# Patient Record
Sex: MALE | URBAN - METROPOLITAN AREA
[De-identification: names, ages, dates, MRNs, and addresses within clinical notes are randomized per-mention and may not be internally consistent; named-entity substitution may affect disease eponyms.]

---

## 2020-05-28 ENCOUNTER — LAB VISIT (OUTPATIENT)
Dept: LAB | Facility: HOSPITAL | Age: 60
End: 2020-05-28
Payer: MEDICAID

## 2020-05-28 DIAGNOSIS — Z01.84 ANTIBODY RESPONSE EXAMINATION: ICD-10-CM

## 2020-05-28 PROCEDURE — 86769 SARS-COV-2 COVID-19 ANTIBODY: CPT

## 2020-05-28 PROCEDURE — 36415 COLL VENOUS BLD VENIPUNCTURE: CPT

## 2020-05-29 LAB — SARS-COV-2 IGG SERPLBLD QL IA.RAPID: POSITIVE

## 2020-08-07 ENCOUNTER — LAB VISIT (OUTPATIENT)
Dept: LAB | Facility: OTHER | Age: 60
End: 2020-08-07
Payer: MEDICAID

## 2020-08-07 DIAGNOSIS — Z03.818 ENCOUNTER FOR OBSERVATION FOR SUSPECTED EXPOSURE TO OTHER BIOLOGICAL AGENTS RULED OUT: ICD-10-CM

## 2020-08-07 PROCEDURE — U0003 INFECTIOUS AGENT DETECTION BY NUCLEIC ACID (DNA OR RNA); SEVERE ACUTE RESPIRATORY SYNDROME CORONAVIRUS 2 (SARS-COV-2) (CORONAVIRUS DISEASE [COVID-19]), AMPLIFIED PROBE TECHNIQUE, MAKING USE OF HIGH THROUGHPUT TECHNOLOGIES AS DESCRIBED BY CMS-2020-01-R: HCPCS

## 2020-08-12 LAB — SARS-COV-2 RNA RESP QL NAA+PROBE: NORMAL

## 2021-01-07 ENCOUNTER — LAB VISIT (OUTPATIENT)
Dept: LAB | Facility: OTHER | Age: 61
End: 2021-01-07
Payer: MEDICAID

## 2021-01-07 DIAGNOSIS — Z03.818 ENCOUNTER FOR OBSERVATION FOR SUSPECTED EXPOSURE TO OTHER BIOLOGICAL AGENTS RULED OUT: ICD-10-CM

## 2021-01-07 PROCEDURE — U0003 INFECTIOUS AGENT DETECTION BY NUCLEIC ACID (DNA OR RNA); SEVERE ACUTE RESPIRATORY SYNDROME CORONAVIRUS 2 (SARS-COV-2) (CORONAVIRUS DISEASE [COVID-19]), AMPLIFIED PROBE TECHNIQUE, MAKING USE OF HIGH THROUGHPUT TECHNOLOGIES AS DESCRIBED BY CMS-2020-01-R: HCPCS

## 2021-01-10 LAB — SARS-COV-2 RNA RESP QL NAA+PROBE: NOT DETECTED

## 2021-01-11 ENCOUNTER — LAB VISIT (OUTPATIENT)
Dept: LAB | Facility: OTHER | Age: 61
End: 2021-01-11
Payer: MEDICAID

## 2021-01-11 DIAGNOSIS — Z03.818 ENCOUNTER FOR OBSERVATION FOR SUSPECTED EXPOSURE TO OTHER BIOLOGICAL AGENTS RULED OUT: ICD-10-CM

## 2021-01-11 PROCEDURE — U0003 INFECTIOUS AGENT DETECTION BY NUCLEIC ACID (DNA OR RNA); SEVERE ACUTE RESPIRATORY SYNDROME CORONAVIRUS 2 (SARS-COV-2) (CORONAVIRUS DISEASE [COVID-19]), AMPLIFIED PROBE TECHNIQUE, MAKING USE OF HIGH THROUGHPUT TECHNOLOGIES AS DESCRIBED BY CMS-2020-01-R: HCPCS

## 2021-01-13 LAB — SARS-COV-2 RNA RESP QL NAA+PROBE: NOT DETECTED

## 2021-01-14 ENCOUNTER — LAB VISIT (OUTPATIENT)
Dept: LAB | Facility: OTHER | Age: 61
End: 2021-01-14
Payer: MEDICAID

## 2021-01-14 DIAGNOSIS — Z20.822 ENCOUNTER FOR LABORATORY TESTING FOR COVID-19 VIRUS: ICD-10-CM

## 2021-01-14 PROCEDURE — U0003 INFECTIOUS AGENT DETECTION BY NUCLEIC ACID (DNA OR RNA); SEVERE ACUTE RESPIRATORY SYNDROME CORONAVIRUS 2 (SARS-COV-2) (CORONAVIRUS DISEASE [COVID-19]), AMPLIFIED PROBE TECHNIQUE, MAKING USE OF HIGH THROUGHPUT TECHNOLOGIES AS DESCRIBED BY CMS-2020-01-R: HCPCS

## 2021-01-15 LAB — SARS-COV-2 RNA RESP QL NAA+PROBE: NOT DETECTED

## 2021-01-18 ENCOUNTER — LAB VISIT (OUTPATIENT)
Dept: LAB | Facility: OTHER | Age: 61
End: 2021-01-18
Payer: MEDICAID

## 2021-01-18 DIAGNOSIS — Z20.822 ENCOUNTER FOR LABORATORY TESTING FOR COVID-19 VIRUS: ICD-10-CM

## 2021-01-18 PROCEDURE — U0003 INFECTIOUS AGENT DETECTION BY NUCLEIC ACID (DNA OR RNA); SEVERE ACUTE RESPIRATORY SYNDROME CORONAVIRUS 2 (SARS-COV-2) (CORONAVIRUS DISEASE [COVID-19]), AMPLIFIED PROBE TECHNIQUE, MAKING USE OF HIGH THROUGHPUT TECHNOLOGIES AS DESCRIBED BY CMS-2020-01-R: HCPCS

## 2021-01-19 LAB — SARS-COV-2 RNA RESP QL NAA+PROBE: NOT DETECTED

## 2021-01-21 ENCOUNTER — LAB VISIT (OUTPATIENT)
Dept: LAB | Facility: OTHER | Age: 61
End: 2021-01-21
Payer: MEDICAID

## 2021-01-21 DIAGNOSIS — Z20.822 ENCOUNTER FOR LABORATORY TESTING FOR COVID-19 VIRUS: ICD-10-CM

## 2021-01-21 PROCEDURE — U0003 INFECTIOUS AGENT DETECTION BY NUCLEIC ACID (DNA OR RNA); SEVERE ACUTE RESPIRATORY SYNDROME CORONAVIRUS 2 (SARS-COV-2) (CORONAVIRUS DISEASE [COVID-19]), AMPLIFIED PROBE TECHNIQUE, MAKING USE OF HIGH THROUGHPUT TECHNOLOGIES AS DESCRIBED BY CMS-2020-01-R: HCPCS

## 2021-01-23 LAB — SARS-COV-2 RNA RESP QL NAA+PROBE: NOT DETECTED

## 2021-01-25 ENCOUNTER — LAB VISIT (OUTPATIENT)
Dept: LAB | Facility: OTHER | Age: 61
End: 2021-01-25
Payer: MEDICAID

## 2021-01-25 DIAGNOSIS — Z20.822 ENCOUNTER FOR LABORATORY TESTING FOR COVID-19 VIRUS: ICD-10-CM

## 2021-01-25 PROCEDURE — U0003 INFECTIOUS AGENT DETECTION BY NUCLEIC ACID (DNA OR RNA); SEVERE ACUTE RESPIRATORY SYNDROME CORONAVIRUS 2 (SARS-COV-2) (CORONAVIRUS DISEASE [COVID-19]), AMPLIFIED PROBE TECHNIQUE, MAKING USE OF HIGH THROUGHPUT TECHNOLOGIES AS DESCRIBED BY CMS-2020-01-R: HCPCS

## 2021-01-26 LAB — SARS-COV-2 RNA RESP QL NAA+PROBE: NOT DETECTED

## 2021-01-28 ENCOUNTER — LAB VISIT (OUTPATIENT)
Dept: LAB | Facility: OTHER | Age: 61
End: 2021-01-28
Payer: MEDICAID

## 2021-01-28 DIAGNOSIS — Z20.822 ENCOUNTER FOR LABORATORY TESTING FOR COVID-19 VIRUS: ICD-10-CM

## 2021-01-28 PROCEDURE — U0003 INFECTIOUS AGENT DETECTION BY NUCLEIC ACID (DNA OR RNA); SEVERE ACUTE RESPIRATORY SYNDROME CORONAVIRUS 2 (SARS-COV-2) (CORONAVIRUS DISEASE [COVID-19]), AMPLIFIED PROBE TECHNIQUE, MAKING USE OF HIGH THROUGHPUT TECHNOLOGIES AS DESCRIBED BY CMS-2020-01-R: HCPCS

## 2021-01-29 LAB — SARS-COV-2 RNA RESP QL NAA+PROBE: NOT DETECTED

## 2021-02-04 ENCOUNTER — LAB VISIT (OUTPATIENT)
Dept: LAB | Facility: OTHER | Age: 61
End: 2021-02-04
Payer: MEDICAID

## 2021-02-04 DIAGNOSIS — Z20.822 ENCOUNTER FOR LABORATORY TESTING FOR COVID-19 VIRUS: ICD-10-CM

## 2021-02-04 PROCEDURE — U0003 INFECTIOUS AGENT DETECTION BY NUCLEIC ACID (DNA OR RNA); SEVERE ACUTE RESPIRATORY SYNDROME CORONAVIRUS 2 (SARS-COV-2) (CORONAVIRUS DISEASE [COVID-19]), AMPLIFIED PROBE TECHNIQUE, MAKING USE OF HIGH THROUGHPUT TECHNOLOGIES AS DESCRIBED BY CMS-2020-01-R: HCPCS

## 2021-02-05 LAB — SARS-COV-2 RNA RESP QL NAA+PROBE: NOT DETECTED

## 2021-02-11 ENCOUNTER — LAB VISIT (OUTPATIENT)
Dept: LAB | Facility: OTHER | Age: 61
End: 2021-02-11
Payer: MEDICAID

## 2021-02-11 DIAGNOSIS — Z20.822 ENCOUNTER FOR LABORATORY TESTING FOR COVID-19 VIRUS: ICD-10-CM

## 2021-02-11 PROCEDURE — U0003 INFECTIOUS AGENT DETECTION BY NUCLEIC ACID (DNA OR RNA); SEVERE ACUTE RESPIRATORY SYNDROME CORONAVIRUS 2 (SARS-COV-2) (CORONAVIRUS DISEASE [COVID-19]), AMPLIFIED PROBE TECHNIQUE, MAKING USE OF HIGH THROUGHPUT TECHNOLOGIES AS DESCRIBED BY CMS-2020-01-R: HCPCS

## 2021-02-12 LAB — SARS-COV-2 RNA RESP QL NAA+PROBE: NOT DETECTED

## 2021-07-26 ENCOUNTER — LAB VISIT (OUTPATIENT)
Dept: LAB | Facility: OTHER | Age: 61
End: 2021-07-26
Payer: MEDICAID

## 2021-07-26 DIAGNOSIS — Z20.822 ENCOUNTER FOR LABORATORY TESTING FOR COVID-19 VIRUS: ICD-10-CM

## 2021-07-26 PROCEDURE — U0003 INFECTIOUS AGENT DETECTION BY NUCLEIC ACID (DNA OR RNA); SEVERE ACUTE RESPIRATORY SYNDROME CORONAVIRUS 2 (SARS-COV-2) (CORONAVIRUS DISEASE [COVID-19]), AMPLIFIED PROBE TECHNIQUE, MAKING USE OF HIGH THROUGHPUT TECHNOLOGIES AS DESCRIBED BY CMS-2020-01-R: HCPCS | Performed by: NURSE PRACTITIONER

## 2021-07-28 LAB
SARS-COV-2 RNA RESP QL NAA+PROBE: NOT DETECTED
SARS-COV-2- CYCLE NUMBER: -1

## 2021-08-02 ENCOUNTER — LAB VISIT (OUTPATIENT)
Dept: LAB | Facility: OTHER | Age: 61
End: 2021-08-02
Payer: MEDICAID

## 2021-08-02 DIAGNOSIS — Z20.822 ENCOUNTER FOR LABORATORY TESTING FOR COVID-19 VIRUS: ICD-10-CM

## 2021-08-02 PROCEDURE — U0003 INFECTIOUS AGENT DETECTION BY NUCLEIC ACID (DNA OR RNA); SEVERE ACUTE RESPIRATORY SYNDROME CORONAVIRUS 2 (SARS-COV-2) (CORONAVIRUS DISEASE [COVID-19]), AMPLIFIED PROBE TECHNIQUE, MAKING USE OF HIGH THROUGHPUT TECHNOLOGIES AS DESCRIBED BY CMS-2020-01-R: HCPCS | Performed by: NURSE PRACTITIONER

## 2021-08-04 LAB
SARS-COV-2 RNA RESP QL NAA+PROBE: NOT DETECTED
SARS-COV-2- CYCLE NUMBER: -1

## 2021-08-06 ENCOUNTER — HOSPITAL ENCOUNTER (EMERGENCY)
Facility: HOSPITAL | Age: 61
Discharge: HOME | End: 2021-08-06
Attending: EMERGENCY MEDICINE
Payer: MEDICAID

## 2021-08-06 VITALS
HEIGHT: 72 IN | BODY MASS INDEX: 29.12 KG/M2 | DIASTOLIC BLOOD PRESSURE: 76 MMHG | SYSTOLIC BLOOD PRESSURE: 122 MMHG | HEART RATE: 85 BPM | RESPIRATION RATE: 16 BRPM | TEMPERATURE: 98 F | WEIGHT: 215 LBS | OXYGEN SATURATION: 96 %

## 2021-08-06 DIAGNOSIS — R00.0 TACHYCARDIA: ICD-10-CM

## 2021-08-06 DIAGNOSIS — R56.9 SEIZURE-LIKE ACTIVITY: Primary | ICD-10-CM

## 2021-08-06 LAB
ALBUMIN SERPL BCP-MCNC: 3.4 G/DL (ref 3.5–5.2)
ALP SERPL-CCNC: 128 U/L (ref 55–135)
ALT SERPL W/O P-5'-P-CCNC: 24 U/L (ref 10–44)
ANION GAP SERPL CALC-SCNC: 11 MMOL/L (ref 8–16)
AST SERPL-CCNC: 25 U/L (ref 10–40)
BACTERIA #/AREA URNS AUTO: ABNORMAL /HPF
BASOPHILS # BLD AUTO: 0.04 K/UL (ref 0–0.2)
BASOPHILS NFR BLD: 0.3 % (ref 0–1.9)
BILIRUB SERPL-MCNC: 0.4 MG/DL (ref 0.1–1)
BILIRUB UR QL STRIP: NEGATIVE
BUN SERPL-MCNC: 8 MG/DL (ref 6–20)
CALCIUM SERPL-MCNC: 9.1 MG/DL (ref 8.7–10.5)
CHLORIDE SERPL-SCNC: 103 MMOL/L (ref 95–110)
CK SERPL-CCNC: 69 U/L (ref 20–200)
CLARITY UR REFRACT.AUTO: CLEAR
CO2 SERPL-SCNC: 27 MMOL/L (ref 23–29)
COLOR UR AUTO: ABNORMAL
CREAT SERPL-MCNC: 0.7 MG/DL (ref 0.5–1.4)
DIFFERENTIAL METHOD: ABNORMAL
EOSINOPHIL # BLD AUTO: 0.2 K/UL (ref 0–0.5)
EOSINOPHIL NFR BLD: 1.7 % (ref 0–8)
ERYTHROCYTE [DISTWIDTH] IN BLOOD BY AUTOMATED COUNT: 18.4 % (ref 11.5–14.5)
EST. GFR  (AFRICAN AMERICAN): >60 ML/MIN/1.73 M^2
EST. GFR  (NON AFRICAN AMERICAN): >60 ML/MIN/1.73 M^2
GLUCOSE SERPL-MCNC: 112 MG/DL (ref 70–110)
GLUCOSE UR QL STRIP: NEGATIVE
HCT VFR BLD AUTO: 45.6 % (ref 40–54)
HCV AB SERPL QL IA: NEGATIVE
HGB BLD-MCNC: 14.3 G/DL (ref 14–18)
HGB UR QL STRIP: ABNORMAL
HIV 1+2 AB+HIV1 P24 AG SERPL QL IA: NEGATIVE
IMM GRANULOCYTES # BLD AUTO: 0.02 K/UL (ref 0–0.04)
IMM GRANULOCYTES NFR BLD AUTO: 0.2 % (ref 0–0.5)
KETONES UR QL STRIP: NEGATIVE
LEUKOCYTE ESTERASE UR QL STRIP: NEGATIVE
LYMPHOCYTES # BLD AUTO: 1.5 K/UL (ref 1–4.8)
LYMPHOCYTES NFR BLD: 12.9 % (ref 18–48)
MAGNESIUM SERPL-MCNC: 1.8 MG/DL (ref 1.6–2.6)
MCH RBC QN AUTO: 22 PG (ref 27–31)
MCHC RBC AUTO-ENTMCNC: 31.4 G/DL (ref 32–36)
MCV RBC AUTO: 70 FL (ref 82–98)
MICROSCOPIC COMMENT: ABNORMAL
MONOCYTES # BLD AUTO: 0.9 K/UL (ref 0.3–1)
MONOCYTES NFR BLD: 8 % (ref 4–15)
NEUTROPHILS # BLD AUTO: 9.1 K/UL (ref 1.8–7.7)
NEUTROPHILS NFR BLD: 76.9 % (ref 38–73)
NITRITE UR QL STRIP: NEGATIVE
NRBC BLD-RTO: 0 /100 WBC
PH UR STRIP: 6 [PH] (ref 5–8)
PLATELET # BLD AUTO: 385 K/UL (ref 150–450)
PMV BLD AUTO: 11.3 FL (ref 9.2–12.9)
POCT GLUCOSE: 143 MG/DL (ref 70–110)
POTASSIUM SERPL-SCNC: 3.8 MMOL/L (ref 3.5–5.1)
PROT SERPL-MCNC: 7.9 G/DL (ref 6–8.4)
PROT UR QL STRIP: NEGATIVE
RBC # BLD AUTO: 6.49 M/UL (ref 4.6–6.2)
RBC #/AREA URNS AUTO: 10 /HPF (ref 0–4)
SODIUM SERPL-SCNC: 141 MMOL/L (ref 136–145)
SP GR UR STRIP: 1 (ref 1–1.03)
SQUAMOUS #/AREA URNS AUTO: 1 /HPF
URN SPEC COLLECT METH UR: ABNORMAL
WBC # BLD AUTO: 11.77 K/UL (ref 3.9–12.7)
WBC #/AREA URNS AUTO: 2 /HPF (ref 0–5)

## 2021-08-06 PROCEDURE — 99285 PR EMERGENCY DEPT VISIT,LEVEL V: ICD-10-PCS | Mod: ,,, | Performed by: EMERGENCY MEDICINE

## 2021-08-06 PROCEDURE — 93010 ELECTROCARDIOGRAM REPORT: CPT | Mod: ,,, | Performed by: INTERNAL MEDICINE

## 2021-08-06 PROCEDURE — 80053 COMPREHEN METABOLIC PANEL: CPT | Performed by: EMERGENCY MEDICINE

## 2021-08-06 PROCEDURE — 81001 URINALYSIS AUTO W/SCOPE: CPT | Performed by: EMERGENCY MEDICINE

## 2021-08-06 PROCEDURE — 82550 ASSAY OF CK (CPK): CPT | Performed by: EMERGENCY MEDICINE

## 2021-08-06 PROCEDURE — 93005 ELECTROCARDIOGRAM TRACING: CPT

## 2021-08-06 PROCEDURE — 99285 EMERGENCY DEPT VISIT HI MDM: CPT | Mod: ,,, | Performed by: EMERGENCY MEDICINE

## 2021-08-06 PROCEDURE — 93010 EKG 12-LEAD: ICD-10-PCS | Mod: ,,, | Performed by: INTERNAL MEDICINE

## 2021-08-06 PROCEDURE — 99285 EMERGENCY DEPT VISIT HI MDM: CPT | Mod: 25

## 2021-08-06 PROCEDURE — 86803 HEPATITIS C AB TEST: CPT | Performed by: EMERGENCY MEDICINE

## 2021-08-06 PROCEDURE — 83735 ASSAY OF MAGNESIUM: CPT | Performed by: EMERGENCY MEDICINE

## 2021-08-06 PROCEDURE — 85025 COMPLETE CBC W/AUTO DIFF WBC: CPT | Performed by: EMERGENCY MEDICINE

## 2021-08-06 PROCEDURE — 87389 HIV-1 AG W/HIV-1&-2 AB AG IA: CPT | Performed by: EMERGENCY MEDICINE

## 2021-08-06 PROCEDURE — 82962 GLUCOSE BLOOD TEST: CPT

## 2021-08-16 ENCOUNTER — LAB VISIT (OUTPATIENT)
Dept: LAB | Facility: OTHER | Age: 61
End: 2021-08-16
Payer: MEDICAID

## 2021-08-16 DIAGNOSIS — Z20.822 ENCOUNTER FOR LABORATORY TESTING FOR COVID-19 VIRUS: ICD-10-CM

## 2021-08-16 PROCEDURE — U0003 INFECTIOUS AGENT DETECTION BY NUCLEIC ACID (DNA OR RNA); SEVERE ACUTE RESPIRATORY SYNDROME CORONAVIRUS 2 (SARS-COV-2) (CORONAVIRUS DISEASE [COVID-19]), AMPLIFIED PROBE TECHNIQUE, MAKING USE OF HIGH THROUGHPUT TECHNOLOGIES AS DESCRIBED BY CMS-2020-01-R: HCPCS | Performed by: NURSE PRACTITIONER

## 2021-08-18 LAB
SARS-COV-2 RNA RESP QL NAA+PROBE: NOT DETECTED
SARS-COV-2- CYCLE NUMBER: -1

## 2021-08-23 ENCOUNTER — LAB VISIT (OUTPATIENT)
Dept: LAB | Facility: OTHER | Age: 61
End: 2021-08-23
Payer: MEDICAID

## 2021-08-23 DIAGNOSIS — Z20.822 ENCOUNTER FOR LABORATORY TESTING FOR COVID-19 VIRUS: ICD-10-CM

## 2021-08-23 PROCEDURE — U0003 INFECTIOUS AGENT DETECTION BY NUCLEIC ACID (DNA OR RNA); SEVERE ACUTE RESPIRATORY SYNDROME CORONAVIRUS 2 (SARS-COV-2) (CORONAVIRUS DISEASE [COVID-19]), AMPLIFIED PROBE TECHNIQUE, MAKING USE OF HIGH THROUGHPUT TECHNOLOGIES AS DESCRIBED BY CMS-2020-01-R: HCPCS | Performed by: NURSE PRACTITIONER

## 2021-08-26 LAB — SARS-COV-2 RNA RESP QL NAA+PROBE: NOT DETECTED

## 2021-09-13 ENCOUNTER — LAB VISIT (OUTPATIENT)
Dept: LAB | Facility: OTHER | Age: 61
End: 2021-09-13
Payer: MEDICAID

## 2021-09-13 DIAGNOSIS — Z20.822 ENCOUNTER FOR LABORATORY TESTING FOR COVID-19 VIRUS: ICD-10-CM

## 2021-09-13 PROCEDURE — U0003 INFECTIOUS AGENT DETECTION BY NUCLEIC ACID (DNA OR RNA); SEVERE ACUTE RESPIRATORY SYNDROME CORONAVIRUS 2 (SARS-COV-2) (CORONAVIRUS DISEASE [COVID-19]), AMPLIFIED PROBE TECHNIQUE, MAKING USE OF HIGH THROUGHPUT TECHNOLOGIES AS DESCRIBED BY CMS-2020-01-R: HCPCS | Performed by: NURSE PRACTITIONER

## 2021-09-15 LAB
SARS-COV-2 RNA RESP QL NAA+PROBE: NOT DETECTED
SARS-COV-2- CYCLE NUMBER: NORMAL

## 2021-09-20 ENCOUNTER — LAB VISIT (OUTPATIENT)
Dept: LAB | Facility: OTHER | Age: 61
End: 2021-09-20
Payer: MEDICAID

## 2021-09-20 DIAGNOSIS — Z20.822 ENCOUNTER FOR LABORATORY TESTING FOR COVID-19 VIRUS: ICD-10-CM

## 2021-09-20 PROCEDURE — U0003 INFECTIOUS AGENT DETECTION BY NUCLEIC ACID (DNA OR RNA); SEVERE ACUTE RESPIRATORY SYNDROME CORONAVIRUS 2 (SARS-COV-2) (CORONAVIRUS DISEASE [COVID-19]), AMPLIFIED PROBE TECHNIQUE, MAKING USE OF HIGH THROUGHPUT TECHNOLOGIES AS DESCRIBED BY CMS-2020-01-R: HCPCS | Performed by: NURSE PRACTITIONER

## 2021-09-21 LAB
SARS-COV-2 RNA RESP QL NAA+PROBE: NOT DETECTED
SARS-COV-2- CYCLE NUMBER: NORMAL

## 2021-12-27 ENCOUNTER — LAB VISIT (OUTPATIENT)
Dept: LAB | Facility: OTHER | Age: 61
End: 2021-12-27
Payer: MEDICAID

## 2021-12-27 DIAGNOSIS — Z20.822 ENCOUNTER FOR LABORATORY TESTING FOR COVID-19 VIRUS: ICD-10-CM

## 2021-12-27 PROCEDURE — U0003 INFECTIOUS AGENT DETECTION BY NUCLEIC ACID (DNA OR RNA); SEVERE ACUTE RESPIRATORY SYNDROME CORONAVIRUS 2 (SARS-COV-2) (CORONAVIRUS DISEASE [COVID-19]), AMPLIFIED PROBE TECHNIQUE, MAKING USE OF HIGH THROUGHPUT TECHNOLOGIES AS DESCRIBED BY CMS-2020-01-R: HCPCS | Performed by: NURSE PRACTITIONER

## 2021-12-28 LAB
SARS-COV-2 RNA RESP QL NAA+PROBE: NOT DETECTED
SARS-COV-2- CYCLE NUMBER: NORMAL

## 2022-01-03 ENCOUNTER — LAB VISIT (OUTPATIENT)
Dept: LAB | Facility: OTHER | Age: 62
End: 2022-01-03
Payer: MEDICAID

## 2022-01-03 DIAGNOSIS — Z20.822 ENCOUNTER FOR LABORATORY TESTING FOR COVID-19 VIRUS: ICD-10-CM

## 2022-01-03 PROCEDURE — U0003 INFECTIOUS AGENT DETECTION BY NUCLEIC ACID (DNA OR RNA); SEVERE ACUTE RESPIRATORY SYNDROME CORONAVIRUS 2 (SARS-COV-2) (CORONAVIRUS DISEASE [COVID-19]), AMPLIFIED PROBE TECHNIQUE, MAKING USE OF HIGH THROUGHPUT TECHNOLOGIES AS DESCRIBED BY CMS-2020-01-R: HCPCS | Performed by: NURSE PRACTITIONER

## 2022-01-06 LAB
SARS-COV-2 RNA RESP QL NAA+PROBE: NOT DETECTED
SARS-COV-2- CYCLE NUMBER: NORMAL

## 2022-01-10 ENCOUNTER — LAB VISIT (OUTPATIENT)
Dept: LAB | Facility: OTHER | Age: 62
End: 2022-01-10
Payer: MEDICAID

## 2022-01-10 DIAGNOSIS — Z20.822 ENCOUNTER FOR LABORATORY TESTING FOR COVID-19 VIRUS: ICD-10-CM

## 2022-01-10 PROCEDURE — U0003 INFECTIOUS AGENT DETECTION BY NUCLEIC ACID (DNA OR RNA); SEVERE ACUTE RESPIRATORY SYNDROME CORONAVIRUS 2 (SARS-COV-2) (CORONAVIRUS DISEASE [COVID-19]), AMPLIFIED PROBE TECHNIQUE, MAKING USE OF HIGH THROUGHPUT TECHNOLOGIES AS DESCRIBED BY CMS-2020-01-R: HCPCS | Performed by: NURSE PRACTITIONER

## 2022-01-11 LAB
SARS-COV-2 RNA RESP QL NAA+PROBE: NOT DETECTED
SARS-COV-2- CYCLE NUMBER: NORMAL

## 2022-01-31 ENCOUNTER — LAB VISIT (OUTPATIENT)
Dept: LAB | Facility: OTHER | Age: 62
End: 2022-01-31
Payer: MEDICAID

## 2022-01-31 DIAGNOSIS — Z20.822 ENCOUNTER FOR LABORATORY TESTING FOR COVID-19 VIRUS: ICD-10-CM

## 2022-01-31 PROCEDURE — U0003 INFECTIOUS AGENT DETECTION BY NUCLEIC ACID (DNA OR RNA); SEVERE ACUTE RESPIRATORY SYNDROME CORONAVIRUS 2 (SARS-COV-2) (CORONAVIRUS DISEASE [COVID-19]), AMPLIFIED PROBE TECHNIQUE, MAKING USE OF HIGH THROUGHPUT TECHNOLOGIES AS DESCRIBED BY CMS-2020-01-R: HCPCS | Performed by: EMERGENCY MEDICINE

## 2022-02-01 LAB
SARS-COV-2 RNA RESP QL NAA+PROBE: NOT DETECTED
SARS-COV-2- CYCLE NUMBER: NORMAL

## 2022-02-09 ENCOUNTER — LAB VISIT (OUTPATIENT)
Dept: LAB | Facility: OTHER | Age: 62
End: 2022-02-09
Payer: MEDICAID

## 2022-02-09 DIAGNOSIS — Z20.822 ENCOUNTER FOR LABORATORY TESTING FOR COVID-19 VIRUS: ICD-10-CM

## 2022-02-09 LAB
SARS-COV-2 RNA RESP QL NAA+PROBE: NOT DETECTED
SARS-COV-2- CYCLE NUMBER: NORMAL

## 2022-02-09 PROCEDURE — U0003 INFECTIOUS AGENT DETECTION BY NUCLEIC ACID (DNA OR RNA); SEVERE ACUTE RESPIRATORY SYNDROME CORONAVIRUS 2 (SARS-COV-2) (CORONAVIRUS DISEASE [COVID-19]), AMPLIFIED PROBE TECHNIQUE, MAKING USE OF HIGH THROUGHPUT TECHNOLOGIES AS DESCRIBED BY CMS-2020-01-R: HCPCS | Performed by: EMERGENCY MEDICINE

## 2022-02-16 ENCOUNTER — LAB VISIT (OUTPATIENT)
Dept: LAB | Facility: OTHER | Age: 62
End: 2022-02-16
Payer: MEDICAID

## 2022-02-16 DIAGNOSIS — Z20.822 ENCOUNTER FOR LABORATORY TESTING FOR COVID-19 VIRUS: ICD-10-CM

## 2022-02-16 PROCEDURE — U0003 INFECTIOUS AGENT DETECTION BY NUCLEIC ACID (DNA OR RNA); SEVERE ACUTE RESPIRATORY SYNDROME CORONAVIRUS 2 (SARS-COV-2) (CORONAVIRUS DISEASE [COVID-19]), AMPLIFIED PROBE TECHNIQUE, MAKING USE OF HIGH THROUGHPUT TECHNOLOGIES AS DESCRIBED BY CMS-2020-01-R: HCPCS | Performed by: EMERGENCY MEDICINE

## 2022-02-17 LAB — SARS-COV-2 RNA RESP QL NAA+PROBE: NOT DETECTED

## 2022-05-12 ENCOUNTER — HOSPITAL ENCOUNTER (INPATIENT)
Facility: HOSPITAL | Age: 62
LOS: 5 days | Discharge: HOME | DRG: 871 | End: 2022-05-17
Attending: EMERGENCY MEDICINE | Admitting: HOSPITALIST
Payer: MEDICAID

## 2022-05-12 DIAGNOSIS — R41.82 AMS (ALTERED MENTAL STATUS): ICD-10-CM

## 2022-05-12 DIAGNOSIS — J18.9 PNEUMONIA: ICD-10-CM

## 2022-05-12 DIAGNOSIS — A41.9 SEPSIS: ICD-10-CM

## 2022-05-12 DIAGNOSIS — R07.9 CHEST PAIN: ICD-10-CM

## 2022-05-12 DIAGNOSIS — T40.2X4A OPIOID OVERDOSE, UNDETERMINED INTENT, INITIAL ENCOUNTER: ICD-10-CM

## 2022-05-12 DIAGNOSIS — R41.89 UNRESPONSIVE: Primary | ICD-10-CM

## 2022-05-12 DIAGNOSIS — E87.6 HYPOKALEMIA: ICD-10-CM

## 2022-05-12 DIAGNOSIS — K85.90 ACUTE PANCREATITIS, UNSPECIFIED COMPLICATION STATUS, UNSPECIFIED PANCREATITIS TYPE: ICD-10-CM

## 2022-05-12 PROBLEM — J44.9 COPD (CHRONIC OBSTRUCTIVE PULMONARY DISEASE): Status: ACTIVE | Noted: 2022-05-12

## 2022-05-12 PROBLEM — I10 HYPERTENSION: Status: ACTIVE | Noted: 2022-05-12

## 2022-05-12 PROBLEM — J96.02 ACUTE HYPERCAPNIC RESPIRATORY FAILURE: Status: RESOLVED | Noted: 2022-05-12 | Resolved: 2022-05-12

## 2022-05-12 PROBLEM — K56.7 ILEUS: Status: ACTIVE | Noted: 2022-05-12

## 2022-05-12 PROBLEM — J96.02 ACUTE HYPERCAPNIC RESPIRATORY FAILURE: Status: ACTIVE | Noted: 2022-05-12

## 2022-05-12 PROBLEM — Z86.73 HISTORY OF CVA (CEREBROVASCULAR ACCIDENT): Status: ACTIVE | Noted: 2022-05-12

## 2022-05-12 LAB
ABO + RH BLD: NORMAL
ALBUMIN SERPL BCP-MCNC: 3.2 G/DL (ref 3.5–5.2)
ALLENS TEST: ABNORMAL
ALLENS TEST: ABNORMAL
ALP SERPL-CCNC: 105 U/L (ref 55–135)
ALT SERPL W/O P-5'-P-CCNC: 19 U/L (ref 10–44)
AMPHET+METHAMPHET UR QL: NEGATIVE
ANION GAP SERPL CALC-SCNC: 13 MMOL/L (ref 8–16)
AST SERPL-CCNC: 19 U/L (ref 10–40)
BACTERIA #/AREA URNS AUTO: ABNORMAL /HPF
BARBITURATES UR QL SCN>200 NG/ML: NEGATIVE
BASOPHILS # BLD AUTO: 0.04 K/UL (ref 0–0.2)
BASOPHILS NFR BLD: 0.3 % (ref 0–1.9)
BENZODIAZ UR QL SCN>200 NG/ML: NEGATIVE
BILIRUB SERPL-MCNC: 0.3 MG/DL (ref 0.1–1)
BILIRUB UR QL STRIP: NEGATIVE
BLD GP AB SCN CELLS X3 SERPL QL: NORMAL
BNP SERPL-MCNC: <10 PG/ML (ref 0–99)
BUN SERPL-MCNC: 10 MG/DL (ref 6–20)
BUN SERPL-MCNC: 9 MG/DL (ref 6–30)
BZE UR QL SCN: NEGATIVE
CALCIUM SERPL-MCNC: 8.5 MG/DL (ref 8.7–10.5)
CANNABINOIDS UR QL SCN: NEGATIVE
CHLORIDE SERPL-SCNC: 101 MMOL/L (ref 95–110)
CHLORIDE SERPL-SCNC: 103 MMOL/L (ref 95–110)
CLARITY UR REFRACT.AUTO: CLEAR
CO2 SERPL-SCNC: 25 MMOL/L (ref 23–29)
COLOR UR AUTO: YELLOW
CREAT SERPL-MCNC: 0.7 MG/DL (ref 0.5–1.4)
CREAT SERPL-MCNC: 0.8 MG/DL (ref 0.5–1.4)
CREAT UR-MCNC: 71 MG/DL (ref 23–375)
CTP QC/QA: YES
DIFFERENTIAL METHOD: ABNORMAL
EOSINOPHIL # BLD AUTO: 0.3 K/UL (ref 0–0.5)
EOSINOPHIL NFR BLD: 2.3 % (ref 0–8)
ERYTHROCYTE [DISTWIDTH] IN BLOOD BY AUTOMATED COUNT: 18.6 % (ref 11.5–14.5)
EST. GFR  (AFRICAN AMERICAN): >60 ML/MIN/1.73 M^2
EST. GFR  (NON AFRICAN AMERICAN): >60 ML/MIN/1.73 M^2
GLUCOSE SERPL-MCNC: 187 MG/DL (ref 70–110)
GLUCOSE SERPL-MCNC: 188 MG/DL (ref 70–110)
GLUCOSE UR QL STRIP: NEGATIVE
HCO3 UR-SCNC: 29.3 MMOL/L (ref 24–28)
HCO3 UR-SCNC: 32.9 MMOL/L (ref 24–28)
HCT VFR BLD AUTO: 42.6 % (ref 40–54)
HCT VFR BLD CALC: 43 %PCV (ref 36–54)
HGB BLD-MCNC: 13.3 G/DL (ref 14–18)
HGB UR QL STRIP: ABNORMAL
HYALINE CASTS UR QL AUTO: 17 /LPF
IMM GRANULOCYTES # BLD AUTO: 0.05 K/UL (ref 0–0.04)
IMM GRANULOCYTES NFR BLD AUTO: 0.3 % (ref 0–0.5)
KETONES UR QL STRIP: NEGATIVE
LACTATE SERPL-SCNC: 3.1 MMOL/L (ref 0.5–2.2)
LEUKOCYTE ESTERASE UR QL STRIP: NEGATIVE
LIPASE SERPL-CCNC: 218 U/L (ref 4–60)
LYMPHOCYTES # BLD AUTO: 1.8 K/UL (ref 1–4.8)
LYMPHOCYTES NFR BLD: 12.2 % (ref 18–48)
MAGNESIUM SERPL-MCNC: 1.7 MG/DL (ref 1.6–2.6)
MCH RBC QN AUTO: 22.1 PG (ref 27–31)
MCHC RBC AUTO-ENTMCNC: 31.2 G/DL (ref 32–36)
MCV RBC AUTO: 71 FL (ref 82–98)
METHADONE UR QL SCN>300 NG/ML: NEGATIVE
MICROSCOPIC COMMENT: ABNORMAL
MONOCYTES # BLD AUTO: 1 K/UL (ref 0.3–1)
MONOCYTES NFR BLD: 6.5 % (ref 4–15)
NEUTROPHILS # BLD AUTO: 11.6 K/UL (ref 1.8–7.7)
NEUTROPHILS NFR BLD: 78.4 % (ref 38–73)
NITRITE UR QL STRIP: NEGATIVE
NRBC BLD-RTO: 0 /100 WBC
OPIATES UR QL SCN: NEGATIVE
PCO2 BLDA: 61.2 MMHG (ref 35–45)
PCO2 BLDA: 62.2 MMHG (ref 35–45)
PCP UR QL SCN>25 NG/ML: NEGATIVE
PH SMN: 7.29 [PH] (ref 7.35–7.45)
PH SMN: 7.33 [PH] (ref 7.35–7.45)
PH UR STRIP: 6 [PH] (ref 5–8)
PLATELET # BLD AUTO: 326 K/UL (ref 150–450)
PMV BLD AUTO: 11 FL (ref 9.2–12.9)
PO2 BLDA: 56 MMHG (ref 40–60)
PO2 BLDA: 64 MMHG (ref 40–60)
POC BE: 3 MMOL/L
POC BE: 7 MMOL/L
POC IONIZED CALCIUM: 1.06 MMOL/L (ref 1.06–1.42)
POC SATURATED O2: 86 % (ref 95–100)
POC SATURATED O2: 89 % (ref 95–100)
POC TCO2 (MEASURED): 26 MMOL/L (ref 23–29)
POC TCO2: 31 MMOL/L (ref 24–29)
POC TCO2: 35 MMOL/L (ref 24–29)
POTASSIUM BLD-SCNC: 3 MMOL/L (ref 3.5–5.1)
POTASSIUM SERPL-SCNC: 3 MMOL/L (ref 3.5–5.1)
PROT SERPL-MCNC: 7 G/DL (ref 6–8.4)
PROT UR QL STRIP: ABNORMAL
RBC # BLD AUTO: 6.01 M/UL (ref 4.6–6.2)
RBC #/AREA URNS AUTO: 22 /HPF (ref 0–4)
SAMPLE: ABNORMAL
SARS-COV-2 RDRP RESP QL NAA+PROBE: NEGATIVE
SITE: ABNORMAL
SITE: ABNORMAL
SODIUM BLD-SCNC: 141 MMOL/L (ref 136–145)
SODIUM SERPL-SCNC: 141 MMOL/L (ref 136–145)
SP GR UR STRIP: 1.01 (ref 1–1.03)
T4 FREE SERPL-MCNC: 0.94 NG/DL (ref 0.71–1.51)
TOXICOLOGY INFORMATION: NORMAL
TROPONIN I SERPL DL<=0.01 NG/ML-MCNC: <0.006 NG/ML (ref 0–0.03)
TSH SERPL DL<=0.005 MIU/L-ACNC: 5.01 UIU/ML (ref 0.4–4)
URN SPEC COLLECT METH UR: ABNORMAL
WBC # BLD AUTO: 14.84 K/UL (ref 3.9–12.7)
WBC #/AREA URNS AUTO: 4 /HPF (ref 0–5)

## 2022-05-12 PROCEDURE — 93010 EKG 12-LEAD: ICD-10-PCS | Mod: ,,, | Performed by: INTERNAL MEDICINE

## 2022-05-12 PROCEDURE — 83880 ASSAY OF NATRIURETIC PEPTIDE: CPT | Performed by: STUDENT IN AN ORGANIZED HEALTH CARE EDUCATION/TRAINING PROGRAM

## 2022-05-12 PROCEDURE — 82803 BLOOD GASES ANY COMBINATION: CPT

## 2022-05-12 PROCEDURE — 99291 PR CRITICAL CARE, E/M 30-74 MINUTES: ICD-10-PCS | Mod: CS,,, | Performed by: EMERGENCY MEDICINE

## 2022-05-12 PROCEDURE — 87186 SC STD MICRODIL/AGAR DIL: CPT | Mod: 59 | Performed by: STUDENT IN AN ORGANIZED HEALTH CARE EDUCATION/TRAINING PROGRAM

## 2022-05-12 PROCEDURE — 84439 ASSAY OF FREE THYROXINE: CPT | Performed by: STUDENT IN AN ORGANIZED HEALTH CARE EDUCATION/TRAINING PROGRAM

## 2022-05-12 PROCEDURE — 80047 BASIC METABLC PNL IONIZED CA: CPT

## 2022-05-12 PROCEDURE — 96361 HYDRATE IV INFUSION ADD-ON: CPT

## 2022-05-12 PROCEDURE — 86900 BLOOD TYPING SEROLOGIC ABO: CPT | Performed by: STUDENT IN AN ORGANIZED HEALTH CARE EDUCATION/TRAINING PROGRAM

## 2022-05-12 PROCEDURE — 63600175 PHARM REV CODE 636 W HCPCS

## 2022-05-12 PROCEDURE — 99291 CRITICAL CARE FIRST HOUR: CPT | Mod: CS,,, | Performed by: EMERGENCY MEDICINE

## 2022-05-12 PROCEDURE — U0002 COVID-19 LAB TEST NON-CDC: HCPCS | Performed by: STUDENT IN AN ORGANIZED HEALTH CARE EDUCATION/TRAINING PROGRAM

## 2022-05-12 PROCEDURE — 63600175 PHARM REV CODE 636 W HCPCS: Performed by: EMERGENCY MEDICINE

## 2022-05-12 PROCEDURE — 80307 DRUG TEST PRSMV CHEM ANLYZR: CPT | Performed by: STUDENT IN AN ORGANIZED HEALTH CARE EDUCATION/TRAINING PROGRAM

## 2022-05-12 PROCEDURE — 96365 THER/PROPH/DIAG IV INF INIT: CPT

## 2022-05-12 PROCEDURE — 85025 COMPLETE CBC W/AUTO DIFF WBC: CPT | Performed by: STUDENT IN AN ORGANIZED HEALTH CARE EDUCATION/TRAINING PROGRAM

## 2022-05-12 PROCEDURE — 80053 COMPREHEN METABOLIC PANEL: CPT | Performed by: STUDENT IN AN ORGANIZED HEALTH CARE EDUCATION/TRAINING PROGRAM

## 2022-05-12 PROCEDURE — 99291 CRITICAL CARE FIRST HOUR: CPT | Mod: 25

## 2022-05-12 PROCEDURE — 84443 ASSAY THYROID STIM HORMONE: CPT | Performed by: STUDENT IN AN ORGANIZED HEALTH CARE EDUCATION/TRAINING PROGRAM

## 2022-05-12 PROCEDURE — 96375 TX/PRO/DX INJ NEW DRUG ADDON: CPT

## 2022-05-12 PROCEDURE — 83735 ASSAY OF MAGNESIUM: CPT | Performed by: STUDENT IN AN ORGANIZED HEALTH CARE EDUCATION/TRAINING PROGRAM

## 2022-05-12 PROCEDURE — 83690 ASSAY OF LIPASE: CPT | Performed by: STUDENT IN AN ORGANIZED HEALTH CARE EDUCATION/TRAINING PROGRAM

## 2022-05-12 PROCEDURE — 93005 ELECTROCARDIOGRAM TRACING: CPT

## 2022-05-12 PROCEDURE — 25000003 PHARM REV CODE 250: Performed by: STUDENT IN AN ORGANIZED HEALTH CARE EDUCATION/TRAINING PROGRAM

## 2022-05-12 PROCEDURE — 83605 ASSAY OF LACTIC ACID: CPT | Performed by: STUDENT IN AN ORGANIZED HEALTH CARE EDUCATION/TRAINING PROGRAM

## 2022-05-12 PROCEDURE — 87077 CULTURE AEROBIC IDENTIFY: CPT | Mod: 59 | Performed by: STUDENT IN AN ORGANIZED HEALTH CARE EDUCATION/TRAINING PROGRAM

## 2022-05-12 PROCEDURE — 20600001 HC STEP DOWN PRIVATE ROOM

## 2022-05-12 PROCEDURE — 86850 RBC ANTIBODY SCREEN: CPT | Performed by: STUDENT IN AN ORGANIZED HEALTH CARE EDUCATION/TRAINING PROGRAM

## 2022-05-12 PROCEDURE — 93010 ELECTROCARDIOGRAM REPORT: CPT | Mod: ,,, | Performed by: INTERNAL MEDICINE

## 2022-05-12 PROCEDURE — 87040 BLOOD CULTURE FOR BACTERIA: CPT | Performed by: STUDENT IN AN ORGANIZED HEALTH CARE EDUCATION/TRAINING PROGRAM

## 2022-05-12 PROCEDURE — 84484 ASSAY OF TROPONIN QUANT: CPT | Performed by: STUDENT IN AN ORGANIZED HEALTH CARE EDUCATION/TRAINING PROGRAM

## 2022-05-12 PROCEDURE — 81001 URINALYSIS AUTO W/SCOPE: CPT | Performed by: STUDENT IN AN ORGANIZED HEALTH CARE EDUCATION/TRAINING PROGRAM

## 2022-05-12 PROCEDURE — 99900035 HC TECH TIME PER 15 MIN (STAT)

## 2022-05-12 PROCEDURE — 63600175 PHARM REV CODE 636 W HCPCS: Performed by: STUDENT IN AN ORGANIZED HEALTH CARE EDUCATION/TRAINING PROGRAM

## 2022-05-12 RX ORDER — POTASSIUM CHLORIDE 20 MEQ/1
20 TABLET, EXTENDED RELEASE ORAL 2 TIMES DAILY
Status: DISCONTINUED | OUTPATIENT
Start: 2022-05-13 | End: 2022-05-17 | Stop reason: HOSPADM

## 2022-05-12 RX ORDER — ACETAMINOPHEN 325 MG/1
650 TABLET ORAL EVERY 4 HOURS PRN
Status: DISCONTINUED | OUTPATIENT
Start: 2022-05-12 | End: 2022-05-17 | Stop reason: HOSPADM

## 2022-05-12 RX ORDER — SODIUM CHLORIDE 0.9 % (FLUSH) 0.9 %
10 SYRINGE (ML) INJECTION EVERY 12 HOURS PRN
Status: DISCONTINUED | OUTPATIENT
Start: 2022-05-12 | End: 2022-05-17 | Stop reason: HOSPADM

## 2022-05-12 RX ORDER — IBUPROFEN 200 MG
16 TABLET ORAL
Status: DISCONTINUED | OUTPATIENT
Start: 2022-05-12 | End: 2022-05-17 | Stop reason: HOSPADM

## 2022-05-12 RX ORDER — NALOXONE HYDROCHLORIDE 1 MG/ML
INJECTION INTRAMUSCULAR; INTRAVENOUS; SUBCUTANEOUS
Status: COMPLETED
Start: 2022-05-12 | End: 2022-05-12

## 2022-05-12 RX ORDER — GLUCAGON 1 MG
1 KIT INJECTION
Status: DISCONTINUED | OUTPATIENT
Start: 2022-05-12 | End: 2022-05-17 | Stop reason: HOSPADM

## 2022-05-12 RX ORDER — POTASSIUM CHLORIDE 7.45 MG/ML
10 INJECTION INTRAVENOUS
Status: COMPLETED | OUTPATIENT
Start: 2022-05-12 | End: 2022-05-12

## 2022-05-12 RX ORDER — FAMOTIDINE 20 MG/1
20 TABLET, FILM COATED ORAL DAILY
Status: DISCONTINUED | OUTPATIENT
Start: 2022-05-13 | End: 2022-05-17 | Stop reason: HOSPADM

## 2022-05-12 RX ORDER — LEVETIRACETAM 500 MG/1
500 TABLET ORAL 2 TIMES DAILY
Status: DISCONTINUED | OUTPATIENT
Start: 2022-05-13 | End: 2022-05-17 | Stop reason: HOSPADM

## 2022-05-12 RX ORDER — NALOXONE HYDROCHLORIDE 1 MG/ML
2 INJECTION INTRAMUSCULAR; INTRAVENOUS; SUBCUTANEOUS
Status: COMPLETED | OUTPATIENT
Start: 2022-05-12 | End: 2022-05-12

## 2022-05-12 RX ORDER — NALOXONE HCL 0.4 MG/ML
0.02 VIAL (ML) INJECTION
Status: DISCONTINUED | OUTPATIENT
Start: 2022-05-12 | End: 2022-05-17 | Stop reason: HOSPADM

## 2022-05-12 RX ORDER — METOCLOPRAMIDE HYDROCHLORIDE 5 MG/ML
5 INJECTION INTRAMUSCULAR; INTRAVENOUS 3 TIMES DAILY
Status: DISCONTINUED | OUTPATIENT
Start: 2022-05-13 | End: 2022-05-14

## 2022-05-12 RX ORDER — NALOXONE HYDROCHLORIDE 1 MG/ML
INJECTION INTRAMUSCULAR; INTRAVENOUS; SUBCUTANEOUS
Status: DISPENSED
Start: 2022-05-12 | End: 2022-05-13

## 2022-05-12 RX ORDER — TRAVOPROST OPHTHALMIC SOLUTION 0.04 MG/ML
1 SOLUTION OPHTHALMIC NIGHTLY
Status: DISCONTINUED | OUTPATIENT
Start: 2022-05-13 | End: 2022-05-17 | Stop reason: HOSPADM

## 2022-05-12 RX ORDER — ENOXAPARIN SODIUM 100 MG/ML
40 INJECTION SUBCUTANEOUS EVERY 24 HOURS
Status: DISCONTINUED | OUTPATIENT
Start: 2022-05-13 | End: 2022-05-12

## 2022-05-12 RX ORDER — SODIUM CHLORIDE, SODIUM LACTATE, POTASSIUM CHLORIDE, CALCIUM CHLORIDE 600; 310; 30; 20 MG/100ML; MG/100ML; MG/100ML; MG/100ML
INJECTION, SOLUTION INTRAVENOUS CONTINUOUS
Status: DISCONTINUED | OUTPATIENT
Start: 2022-05-13 | End: 2022-05-17

## 2022-05-12 RX ORDER — AZITHROMYCIN 250 MG/1
500 TABLET, FILM COATED ORAL DAILY
Status: DISCONTINUED | OUTPATIENT
Start: 2022-05-13 | End: 2022-05-13

## 2022-05-12 RX ORDER — ATORVASTATIN CALCIUM 20 MG/1
20 TABLET, FILM COATED ORAL NIGHTLY
Status: DISCONTINUED | OUTPATIENT
Start: 2022-05-13 | End: 2022-05-17 | Stop reason: HOSPADM

## 2022-05-12 RX ORDER — ALBUTEROL SULFATE 90 UG/1
2 AEROSOL, METERED RESPIRATORY (INHALATION) EVERY 4 HOURS PRN
Status: DISCONTINUED | OUTPATIENT
Start: 2022-05-13 | End: 2022-05-17 | Stop reason: HOSPADM

## 2022-05-12 RX ORDER — IBUPROFEN 200 MG
24 TABLET ORAL
Status: DISCONTINUED | OUTPATIENT
Start: 2022-05-12 | End: 2022-05-17 | Stop reason: HOSPADM

## 2022-05-12 RX ORDER — ONDANSETRON 2 MG/ML
8 INJECTION INTRAMUSCULAR; INTRAVENOUS
Status: COMPLETED | OUTPATIENT
Start: 2022-05-12 | End: 2022-05-12

## 2022-05-12 RX ORDER — ACETAMINOPHEN 325 MG/1
650 TABLET ORAL EVERY 8 HOURS PRN
Status: DISCONTINUED | OUTPATIENT
Start: 2022-05-12 | End: 2022-05-17 | Stop reason: HOSPADM

## 2022-05-12 RX ADMIN — VANCOMYCIN HYDROCHLORIDE 1750 MG: 500 INJECTION, POWDER, LYOPHILIZED, FOR SOLUTION INTRAVENOUS at 08:05

## 2022-05-12 RX ADMIN — ONDANSETRON 8 MG: 2 INJECTION INTRAMUSCULAR; INTRAVENOUS at 06:05

## 2022-05-12 RX ADMIN — NALOXONE HYDROCHLORIDE 2 MG: 1 INJECTION PARENTERAL at 06:05

## 2022-05-12 RX ADMIN — POTASSIUM CHLORIDE 10 MEQ: 7.46 INJECTION, SOLUTION INTRAVENOUS at 11:05

## 2022-05-12 RX ADMIN — SODIUM CHLORIDE 500 ML: 0.9 INJECTION, SOLUTION INTRAVENOUS at 08:05

## 2022-05-12 RX ADMIN — SODIUM CHLORIDE 1000 ML: 0.9 INJECTION, SOLUTION INTRAVENOUS at 07:05

## 2022-05-12 RX ADMIN — PIPERACILLIN SODIUM AND TAZOBACTAM SODIUM 4.5 G: 4; .5 INJECTION, POWDER, FOR SOLUTION INTRAVENOUS at 08:05

## 2022-05-12 RX ADMIN — NALOXONE HYDROCHLORIDE 2 MG: 1 INJECTION INTRAMUSCULAR; INTRAVENOUS; SUBCUTANEOUS at 06:05

## 2022-05-13 PROBLEM — Z86.718 HISTORY OF DVT (DEEP VEIN THROMBOSIS): Status: ACTIVE | Noted: 2022-05-13

## 2022-05-13 PROBLEM — R74.8 ELEVATED LIPASE: Status: ACTIVE | Noted: 2022-05-13

## 2022-05-13 LAB
ALBUMIN SERPL BCP-MCNC: 3 G/DL (ref 3.5–5.2)
ALP SERPL-CCNC: 105 U/L (ref 55–135)
ALT SERPL W/O P-5'-P-CCNC: 17 U/L (ref 10–44)
ANION GAP SERPL CALC-SCNC: 9 MMOL/L (ref 8–16)
ASCENDING AORTA: 2.94 CM
AST SERPL-CCNC: 15 U/L (ref 10–40)
AV INDEX (PROSTH): 0.82
AV MEAN GRADIENT: 2 MMHG
AV PEAK GRADIENT: 4 MMHG
AV VALVE AREA: 2.62 CM2
AV VELOCITY RATIO: 0.87
BASOPHILS # BLD AUTO: 0.04 K/UL (ref 0–0.2)
BASOPHILS NFR BLD: 0.4 % (ref 0–1.9)
BILIRUB SERPL-MCNC: 0.3 MG/DL (ref 0.1–1)
BSA FOR ECHO PROCEDURE: 2.18 M2
BUN SERPL-MCNC: 7 MG/DL (ref 6–20)
CALCIUM SERPL-MCNC: 8.4 MG/DL (ref 8.7–10.5)
CHLORIDE SERPL-SCNC: 102 MMOL/L (ref 95–110)
CO2 SERPL-SCNC: 26 MMOL/L (ref 23–29)
CREAT SERPL-MCNC: 0.6 MG/DL (ref 0.5–1.4)
CV ECHO LV RWT: 0.62 CM
DIFFERENTIAL METHOD: ABNORMAL
DOP CALC AO PEAK VEL: 0.94 M/S
DOP CALC AO VTI: 19.19 CM
DOP CALC LVOT AREA: 3.2 CM2
DOP CALC LVOT DIAMETER: 2.02 CM
DOP CALC LVOT PEAK VEL: 0.82 M/S
DOP CALC LVOT STROKE VOLUME: 50.29 CM3
DOP CALCLVOT PEAK VEL VTI: 15.7 CM
E WAVE DECELERATION TIME: 165.64 MSEC
E/A RATIO: 1.35
E/E' RATIO: 7.2 M/S
ECHO LV POSTERIOR WALL: 0.95 CM (ref 0.6–1.1)
EJECTION FRACTION: 55 %
EOSINOPHIL # BLD AUTO: 0.1 K/UL (ref 0–0.5)
EOSINOPHIL NFR BLD: 0.4 % (ref 0–8)
ERYTHROCYTE [DISTWIDTH] IN BLOOD BY AUTOMATED COUNT: 17.9 % (ref 11.5–14.5)
EST. GFR  (AFRICAN AMERICAN): >60 ML/MIN/1.73 M^2
EST. GFR  (NON AFRICAN AMERICAN): >60 ML/MIN/1.73 M^2
FRACTIONAL SHORTENING: 25 % (ref 28–44)
GLUCOSE SERPL-MCNC: 84 MG/DL (ref 70–110)
HCT VFR BLD AUTO: 42.1 % (ref 40–54)
HGB BLD-MCNC: 13 G/DL (ref 14–18)
IMM GRANULOCYTES # BLD AUTO: 0.03 K/UL (ref 0–0.04)
IMM GRANULOCYTES NFR BLD AUTO: 0.3 % (ref 0–0.5)
INTERVENTRICULAR SEPTUM: 1.01 CM (ref 0.6–1.1)
LA MAJOR: 4.73 CM
LA MINOR: 4.13 CM
LA WIDTH: 2.96 CM
LACTATE SERPL-SCNC: 1.8 MMOL/L (ref 0.5–2.2)
LEFT ATRIUM SIZE: 3.04 CM
LEFT ATRIUM VOLUME INDEX: 15.5 ML/M2
LEFT ATRIUM VOLUME: 33.73 CM3
LEFT INTERNAL DIMENSION IN SYSTOLE: 2.31 CM (ref 2.1–4)
LEFT VENTRICLE DIASTOLIC VOLUME INDEX: 17.12 ML/M2
LEFT VENTRICLE DIASTOLIC VOLUME: 37.16 ML
LEFT VENTRICLE MASS INDEX: 38 G/M2
LEFT VENTRICLE SYSTOLIC VOLUME INDEX: 8.5 ML/M2
LEFT VENTRICLE SYSTOLIC VOLUME: 18.34 ML
LEFT VENTRICULAR INTERNAL DIMENSION IN DIASTOLE: 3.07 CM (ref 3.5–6)
LEFT VENTRICULAR MASS: 82.41 G
LV LATERAL E/E' RATIO: 6 M/S
LV SEPTAL E/E' RATIO: 9 M/S
LYMPHOCYTES # BLD AUTO: 2.1 K/UL (ref 1–4.8)
LYMPHOCYTES NFR BLD: 18.8 % (ref 18–48)
MAGNESIUM SERPL-MCNC: 1.6 MG/DL (ref 1.6–2.6)
MCH RBC QN AUTO: 22.1 PG (ref 27–31)
MCHC RBC AUTO-ENTMCNC: 30.9 G/DL (ref 32–36)
MCV RBC AUTO: 72 FL (ref 82–98)
MONOCYTES # BLD AUTO: 1.3 K/UL (ref 0.3–1)
MONOCYTES NFR BLD: 11.2 % (ref 4–15)
MV PEAK A VEL: 0.4 M/S
MV PEAK E VEL: 0.54 M/S
MV STENOSIS PRESSURE HALF TIME: 48.04 MS
MV VALVE AREA P 1/2 METHOD: 4.58 CM2
NEUTROPHILS # BLD AUTO: 7.7 K/UL (ref 1.8–7.7)
NEUTROPHILS NFR BLD: 68.9 % (ref 38–73)
NRBC BLD-RTO: 0 /100 WBC
PHOSPHATE SERPL-MCNC: 2.7 MG/DL (ref 2.7–4.5)
PLATELET # BLD AUTO: 299 K/UL (ref 150–450)
PMV BLD AUTO: 11.7 FL (ref 9.2–12.9)
POTASSIUM SERPL-SCNC: 3.6 MMOL/L (ref 3.5–5.1)
PROT SERPL-MCNC: 6.6 G/DL (ref 6–8.4)
RA MAJOR: 4.54 CM
RA PRESSURE: 3 MMHG
RA WIDTH: 2.95 CM
RBC # BLD AUTO: 5.89 M/UL (ref 4.6–6.2)
RIGHT VENTRICULAR END-DIASTOLIC DIMENSION: 2.97 CM
SINUS: 3.08 CM
SODIUM SERPL-SCNC: 137 MMOL/L (ref 136–145)
STJ: 3.02 CM
TDI LATERAL: 0.09 M/S
TDI SEPTAL: 0.06 M/S
TDI: 0.08 M/S
TRICUSPID ANNULAR PLANE SYSTOLIC EXCURSION: 1.92 CM
WBC # BLD AUTO: 11.22 K/UL (ref 3.9–12.7)

## 2022-05-13 PROCEDURE — 85025 COMPLETE CBC W/AUTO DIFF WBC: CPT

## 2022-05-13 PROCEDURE — 97535 SELF CARE MNGMENT TRAINING: CPT

## 2022-05-13 PROCEDURE — 83605 ASSAY OF LACTIC ACID: CPT

## 2022-05-13 PROCEDURE — 36415 COLL VENOUS BLD VENIPUNCTURE: CPT

## 2022-05-13 PROCEDURE — 25000003 PHARM REV CODE 250

## 2022-05-13 PROCEDURE — 63600175 PHARM REV CODE 636 W HCPCS

## 2022-05-13 PROCEDURE — 20600001 HC STEP DOWN PRIVATE ROOM

## 2022-05-13 PROCEDURE — 99223 1ST HOSP IP/OBS HIGH 75: CPT | Mod: ,,, | Performed by: HOSPITALIST

## 2022-05-13 PROCEDURE — 94761 N-INVAS EAR/PLS OXIMETRY MLT: CPT

## 2022-05-13 PROCEDURE — 97165 OT EVAL LOW COMPLEX 30 MIN: CPT

## 2022-05-13 PROCEDURE — 27000221 HC OXYGEN, UP TO 24 HOURS

## 2022-05-13 PROCEDURE — 84100 ASSAY OF PHOSPHORUS: CPT

## 2022-05-13 PROCEDURE — 97161 PT EVAL LOW COMPLEX 20 MIN: CPT

## 2022-05-13 PROCEDURE — 63700000 PHARM REV CODE 250 ALT 637 W/O HCPCS

## 2022-05-13 PROCEDURE — 99223 PR INITIAL HOSPITAL CARE,LEVL III: ICD-10-PCS | Mod: ,,, | Performed by: HOSPITALIST

## 2022-05-13 PROCEDURE — 92610 EVALUATE SWALLOWING FUNCTION: CPT

## 2022-05-13 PROCEDURE — 80053 COMPREHEN METABOLIC PANEL: CPT

## 2022-05-13 PROCEDURE — 83735 ASSAY OF MAGNESIUM: CPT

## 2022-05-13 RX ORDER — METOPROLOL TARTRATE 25 MG/1
25 TABLET, FILM COATED ORAL 2 TIMES DAILY
COMMUNITY

## 2022-05-13 RX ORDER — LEVETIRACETAM 500 MG/1
500 TABLET ORAL 2 TIMES DAILY
COMMUNITY

## 2022-05-13 RX ORDER — ALBUTEROL SULFATE 90 UG/1
2 AEROSOL, METERED RESPIRATORY (INHALATION) EVERY 4 HOURS PRN
COMMUNITY

## 2022-05-13 RX ORDER — METOCLOPRAMIDE HYDROCHLORIDE 5 MG/5ML
5 SOLUTION ORAL EVERY 8 HOURS PRN
COMMUNITY

## 2022-05-13 RX ORDER — MAG HYDROX/ALUMINUM HYD/SIMETH 200-200-20
30 SUSPENSION, ORAL (FINAL DOSE FORM) ORAL EVERY 4 HOURS PRN
COMMUNITY

## 2022-05-13 RX ORDER — POTASSIUM CHLORIDE 20 MEQ/1
20 TABLET, EXTENDED RELEASE ORAL 2 TIMES DAILY
COMMUNITY

## 2022-05-13 RX ORDER — FAMOTIDINE 20 MG/1
20 TABLET, FILM COATED ORAL DAILY
COMMUNITY

## 2022-05-13 RX ORDER — BUMETANIDE 2 MG/1
2 TABLET ORAL DAILY
COMMUNITY

## 2022-05-13 RX ORDER — MULTIVITAMIN
1 TABLET ORAL DAILY
COMMUNITY

## 2022-05-13 RX ORDER — DOXYCYCLINE 50 MG/1
50 CAPSULE ORAL EVERY 12 HOURS
Status: DISCONTINUED | OUTPATIENT
Start: 2022-05-14 | End: 2022-05-13 | Stop reason: SDUPTHER

## 2022-05-13 RX ORDER — GUAIFENESIN 600 MG/1
600 TABLET, EXTENDED RELEASE ORAL 2 TIMES DAILY
COMMUNITY

## 2022-05-13 RX ORDER — ERGOCALCIFEROL 1.25 MG/1
50000 CAPSULE ORAL
COMMUNITY

## 2022-05-13 RX ORDER — ACETAMINOPHEN 325 MG/1
325 TABLET ORAL EVERY 4 HOURS PRN
COMMUNITY

## 2022-05-13 RX ORDER — ATORVASTATIN CALCIUM 20 MG/1
20 TABLET, FILM COATED ORAL NIGHTLY
COMMUNITY

## 2022-05-13 RX ORDER — BISACODYL 10 MG
10 SUPPOSITORY, RECTAL RECTAL DAILY PRN
COMMUNITY

## 2022-05-13 RX ORDER — SENNOSIDES 8.6 MG/1
2 TABLET ORAL DAILY PRN
COMMUNITY

## 2022-05-13 RX ORDER — DOXYCYCLINE HYCLATE 100 MG
100 TABLET ORAL EVERY 12 HOURS
Status: DISCONTINUED | OUTPATIENT
Start: 2022-05-14 | End: 2022-05-14

## 2022-05-13 RX ORDER — ASCORBIC ACID 500 MG
500 TABLET ORAL DAILY
COMMUNITY

## 2022-05-13 RX ORDER — BRIMONIDINE TARTRATE 2 MG/ML
1 SOLUTION/ DROPS OPHTHALMIC 2 TIMES DAILY
COMMUNITY

## 2022-05-13 RX ADMIN — LEVETIRACETAM 500 MG: 500 TABLET, FILM COATED ORAL at 09:05

## 2022-05-13 RX ADMIN — AMPICILLIN AND SULBACTAM 1.5 G: 1; .5 INJECTION, POWDER, FOR SOLUTION INTRAVENOUS at 01:05

## 2022-05-13 RX ADMIN — LEVETIRACETAM 500 MG: 500 TABLET, FILM COATED ORAL at 12:05

## 2022-05-13 RX ADMIN — SODIUM CHLORIDE, SODIUM LACTATE, POTASSIUM CHLORIDE, AND CALCIUM CHLORIDE: .6; .31; .03; .02 INJECTION, SOLUTION INTRAVENOUS at 10:05

## 2022-05-13 RX ADMIN — TRAVOPROST 1 DROP: 0.04 SOLUTION/ DROPS OPHTHALMIC at 09:05

## 2022-05-13 RX ADMIN — FAMOTIDINE 20 MG: 20 TABLET ORAL at 08:05

## 2022-05-13 RX ADMIN — AZITHROMYCIN MONOHYDRATE 500 MG: 250 TABLET ORAL at 08:05

## 2022-05-13 RX ADMIN — SODIUM CHLORIDE, SODIUM LACTATE, POTASSIUM CHLORIDE, AND CALCIUM CHLORIDE: .6; .31; .03; .02 INJECTION, SOLUTION INTRAVENOUS at 09:05

## 2022-05-13 RX ADMIN — TRAVOPROST 1 DROP: 0.04 SOLUTION/ DROPS OPHTHALMIC at 01:05

## 2022-05-13 RX ADMIN — ATORVASTATIN CALCIUM 20 MG: 20 TABLET, FILM COATED ORAL at 09:05

## 2022-05-13 RX ADMIN — METOCLOPRAMIDE 5 MG: 5 INJECTION, SOLUTION INTRAMUSCULAR; INTRAVENOUS at 09:05

## 2022-05-13 RX ADMIN — ATORVASTATIN CALCIUM 20 MG: 20 TABLET, FILM COATED ORAL at 12:05

## 2022-05-13 RX ADMIN — POTASSIUM CHLORIDE 20 MEQ: 1500 TABLET, EXTENDED RELEASE ORAL at 08:05

## 2022-05-13 RX ADMIN — AMPICILLIN AND SULBACTAM 1.5 G: 1; .5 INJECTION, POWDER, FOR SOLUTION INTRAVENOUS at 06:05

## 2022-05-13 RX ADMIN — LEVETIRACETAM 500 MG: 500 TABLET, FILM COATED ORAL at 08:05

## 2022-05-13 RX ADMIN — METOCLOPRAMIDE 5 MG: 5 INJECTION, SOLUTION INTRAMUSCULAR; INTRAVENOUS at 03:05

## 2022-05-13 RX ADMIN — POTASSIUM CHLORIDE 20 MEQ: 1500 TABLET, EXTENDED RELEASE ORAL at 09:05

## 2022-05-13 RX ADMIN — SODIUM CHLORIDE, SODIUM LACTATE, POTASSIUM CHLORIDE, AND CALCIUM CHLORIDE: .6; .31; .03; .02 INJECTION, SOLUTION INTRAVENOUS at 12:05

## 2022-05-13 RX ADMIN — AMPICILLIN AND SULBACTAM 1.5 G: 1; .5 INJECTION, POWDER, FOR SOLUTION INTRAVENOUS at 07:05

## 2022-05-13 RX ADMIN — METOCLOPRAMIDE 5 MG: 5 INJECTION, SOLUTION INTRAMUSCULAR; INTRAVENOUS at 08:05

## 2022-05-13 NOTE — PT/OT/SLP EVAL
"Speech Language Pathology Evaluation  Bedside Swallow    Patient Name:  Anthony Borges   MRN:  [id number]  Admitting Diagnosis: Sepsis    Recommendations:                 General Recommendations:  Follow-up not indicated  Diet recommendations:  Regular, Thin   Aspiration Precautions: HOB to 90 degrees, Meds whole 1 at a time, Small bites/sips and Standard aspiration precautions   General Precautions: Standard, aspiration  Communication strategies:  none    History:     No past medical history on file.    No past surgical history on file.    Prior diet: regular with thin        Subjective     "I do okay" per pt. Re eating  Patient goals: did not state    Pain/Comfort:  · Pain Rating 1: 0/10  · Pain Rating Post-Intervention 1: 0/10    Respiratory Status: nasal cannula    Objective:     Oral Musculature Evaluation  · Oral Musculature: WFL  · Dentition: present and adequate  · Secretion Management: adequate  · Mucosal Quality: good  · Mandibular Strength and Mobility: WFL  · Oral Labial Strength and Mobility: WFL  · Lingual Strength and Mobility: WFL  · Velar Elevation: WFL  · Buccal Strength and Mobility: WFL  · Volitional Cough: strong  · Volitional Swallow: no delay  · Voice Prior to PO Intake: wfl    Bedside Swallow Eval:   Consistencies Assessed:  · Thin liquids self fed 4 ounces of water via cup with a straw  · Puree 1 teaspoon  · Solids self fed one cracker     Oral Phase:   · WFL    Pharyngeal Phase:   · no overt clinical signs/symptoms of aspiration  · no overt clinical signs/symptoms of pharyngeal dysphagia    Compensatory Strategies  · None    Treatment: Ongoing education provided re plan of care and aspiration precautions    Assessment:     Anthony Borges is a 61 y.o. male with oral and pharyngeal phases of swallow wfl  Goals:   Multidisciplinary Problems     SLP Goals     Not on file                Plan:     · Patient to be seen:      · Plan of Care expires:     · Plan of Care reviewed with:  patient   · SLP " Follow-Up:  No       Discharge recommendations:  home   Barriers to Discharge:  None    Time Tracking:     SLP Treatment Date:   05/13/22  Speech Start Time:  1045  Speech Stop Time:  1101     Speech Total Time (min):  16 min    Billable Minutes: Eval Swallow and Oral Function 8 and Self Care/Home Management Training 8    05/13/2022

## 2022-05-13 NOTE — PT/OT/SLP EVAL
Physical Therapy Evaluation and Discharge Note    Patient Name:  Anthony Borges   MRN:  [id number]    Recommendations:     Discharge Recommendations:  nursing facility, basic (return to)   Discharge Equipment Recommendations: none   Barriers to discharge: None    Assessment:     Anthony Borges is a 61 y.o. male admitted with a medical diagnosis of Sepsis. .  At this time, patient is functioning at their prior level of function and does not require further acute PT services. Patient is a nursing home resident. Limited LE ROM; at NH, per patient, madi lift to chair, mostly bedbound.    Recent Surgery: * No surgery found *      Plan:     During this hospitalization, patient does not require further acute PT services.  Please re-consult if situation changes.      Subjective     Chief Complaint: no complaints  Patient/Family Comments/goals: agreeable to therapy session. Reports expects to return to NH  Pain/Comfort:  Pain Rating 1: 0/10  Location 1:  (mild B hip discomfort w/ sitting EOB, ROM)  Pain Addressed 1: Reposition  Pain Rating Post-Intervention 1: 0/10    Patients cultural, spiritual, Cheondoism conflicts given the current situation: no    Living Environment:  Patient is a resident at [facility]  Prior to admission, patients level of function was dependent for mobility. Per patient, lift to reclining chair and staff pushes him in chair.  Equipment used at home: wheelchair, hospital bed, lift device.  DME owned (not currently used): none.  Upon discharge, patient will have assistance from NH staff.    Objective:   Therapy evaluation completed with Occupational Therapist to best establish plan of care for acute setting and to provide skilled treatment.    Communicated with nurse prior to session.  Patient found HOB elevated with blood pressure cuff, telemetry, Condom Catheter, peripheral IV, oxygen upon PT entry to room.    General Precautions: Standard, fall   Orthopedic Precautions:N/A   Braces:  "N/A   Respiratory Status: oxygen 2 LPM    Exams:  · Cognitive Exam:  Patient is oriented to Person, for place reports "nursing home"  · Gross Motor Coordination:  Poor, limited ROM and strength  · Postural Exam:  Patient presented with the following abnormalities:    · -       Rounded shoulders  · -       Posterior pelvic tilt  · -       Decreased LE flexion, RUE contracted  · RUE: contracted  · RLE ROM: Deficits: positioned in ER and w/ shortened appearance (may be d/t trunk positioning). HF~30*, KF ~30*, ankle in PF. HE and KE to 0*  · RLE Strength: no active movement noted.   · LLE ROM: HF ~40*: KF~30*, ankle in PF  · LLE Strength: can initiate movement HF, KF, ankle    Functional Mobility:  · Bed Mobility:     · Supine to Sit: total assistance and of 2 persons  · Sit to Supine: total assistance and of 2 persons  · Transfers:  Unable d/t LE decreased ROM and strength, decreased sit balance. (would need madi lift or drawsheet transfer)  · Gait: unable  · Balance: sits EOB w/ TA of 2. Limited HF and KF, posterior trunk lean.     AM-PAC 6 CLICK MOBILITY  Total Score:8       Therapeutic Activities and Exercises:   patient educated on PT POC of eval and discharge.   Patient sits EOB approx one minute w/ TA of 2    AM-PAC 6 CLICK MOBILITY  Total Score:8     Patient left HOB elevated with all lines intact and call button in reach.    GOALS:   Multidisciplinary Problems     Physical Therapy Goals     Not on file                History:     No past medical history on file.    No past surgical history on file.    Time Tracking:     PT Received On: 05/13/22  PT Start Time: 0906     PT Stop Time: 0923  PT Total Time (min): 17 min     Billable Minutes: Evaluation 17 05/13/2022  "

## 2022-05-13 NOTE — ASSESSMENT & PLAN NOTE
Reported ileus from nursing home. Large amount of gas and stool in large bowel on xray    Continue home metoclopramide

## 2022-05-13 NOTE — ED NOTES
Telemetry Verification   Patient placed on Telemetry Box  Verified with War Room  Box # [id number]   Monitor Tech    Rate    Rhythm

## 2022-05-13 NOTE — PT/OT/SLP EVAL
Occupational Therapy   Evaluation and Discharge Note    Name: Anthony Borges  MRN: [id number]  Admitting Diagnosis:  Sepsis   Recent Surgery: * No surgery found *      Recommendations:     Discharge Recommendations: nursing facility, basic  Discharge Equipment Recommendations:     Barriers to discharge:       Assessment:     Anthony Borges is a 61 y.o. male with a medical diagnosis of Sepsis. At this time, patient is functioning at their prior level of function and does not require further acute OT services.     Plan:     During this hospitalization, patient does not require further acute OT services.  Please re-consult if situation changes.    · Plan of Care Reviewed with: patient    Subjective     Occupational Profile:  Patient is a resident at [facility]  Prior to admission, patients level of function was dependent for mobility and ADLs. Per patient, lift to reclining chair and staff pushes him in chair.    Equipment used at home: wheelchair, hospital bed, lift device.     Upon discharge, patient will have assistance from NH staff.    Pain/Comfort:  · Pain Rating 1: 0/10    Patients cultural, spiritual, Latter day conflicts given the current situation:      Objective:     Communicated with: rn prior to session.  Patient found supine with Condom Catheter, oxygen, peripheral IV, telemetry upon OT entry to room.    General Precautions: Standard, fall   Orthopedic Precautions:    Braces:    Respiratory Status: Nasal cannula, flow 2 L/min     Occupational Performance:    Bed Mobility:    · Patient completed Supine to Sit with total assistance and 2 persons  · Patient completed Sit to Supine with total assistance and 2 persons    Functional Mobility/Transfers:  · Bedbound at baseline    Activities of Daily Living:  · Grooming: supervision to wipe face from supine position.   · Dependent with all complex ADLs.    Cognitive/Visual Perceptual:  Cognitive/Psychosocial Skills:  -       Oriented to: Person and  Situation     Physical Exam:  Upper Extremity Range of Motion:     -       Right Upper Extremity: Deficits: severely contracted - no AROM.  -       Left Upper Extremity: WFL  Upper Extremity Strength:    -       Left Upper Extremity: WFL    AMPAC 6 Click ADL:  AMPAC Total Score: 9    Treatment & Education:  UE ROM/MMT  Bed mobility assessment  Sitting balance assessment  Discussed D/C of OT / Post-acute plan  Education:    Patient left supine with all lines intact and call button in reach    GOALS:   Multidisciplinary Problems     Occupational Therapy Goals     Not on file                History:     No past medical history on file.    No past surgical history on file.    Time Tracking:     OT Date of Treatment: 05/13/22  OT Start Time: 0906  OT Stop Time: 0924  OT Total Time (min): 18 min    Billable Minutes:Evaluation 18    5/13/2022

## 2022-05-13 NOTE — ASSESSMENT & PLAN NOTE
Lipase 218 on admission  No complaints of abdominal or back pain at this time. Unclear baseline lipase. Patient is being treated with fluids for sepsis. If acute abdominal changes CT

## 2022-05-13 NOTE — ED NOTES
Telemetry Verification   Patient placed on Telemetry Box  Verified with War Room  Box # [id number]   Monitor Tech    Rate 100   Rhythm Sinus Tach

## 2022-05-13 NOTE — ED PROVIDER NOTES
Encounter Date: 5/12/2022       History     Chief Complaint   Patient presents with    unresponsive     Arrives via EMS from [facility], call out for cardiac arrest with CPR in progress, pt had a pulse upon EMS arrival but breathing at a rate of 6 per minute, upon arrival to ED pt being bagged      61-year-old male, unknown medical history presenting to the ED with unresponsiveness.  Patient noted to be unresponsive at [place].  Per EMS, he CPR was started.  Unknown how long CPR was going for.  Patient was breathing 6 times a minute.  Being bagged with EMS.  Limited history due to patient's mental status, acuity of condition an absence of caregiver    The history is provided by the EMS personnel. The history is limited by the absence of a caregiver and the condition of the patient.     Review of patient's allergies indicates:  No Known Allergies  No past medical history on file.  No past surgical history on file.  No family history on file.     Review of Systems   Unable to perform ROS: Acuity of condition       Physical Exam     Initial Vitals   BP Pulse Resp Temp SpO2   05/12/22 1850 05/12/22 1850 05/12/22 1850 05/12/22 1941 05/12/22 1850   137/73 (!) 150 (!) 6 98.6 °F (37 °C) 99 %      MAP       --                Physical Exam    Nursing note and vitals reviewed.  Constitutional: Vital signs are normal. He appears well-developed and well-nourished. He is not diaphoretic. He does not appear ill. He appears distressed.   Being bagged with  EMS   HENT:   Head: Normocephalic and atraumatic.   Eyes: Conjunctivae are normal. Right eye exhibits no discharge. Left eye exhibits no discharge. Right conjunctiva is not injected. Left conjunctiva is not injected. No scleral icterus.   Cardiovascular: Regular rhythm, S1 normal and S2 normal. Exam reveals no gallop and no friction rub.    No murmur heard.  Tachycardic   Pulmonary/Chest: No respiratory distress. He has no wheezes. He has no rhonchi. He has no  rales. He exhibits no tenderness.   Diminished left-sided lung sounds   Abdominal: Abdomen is soft. He exhibits no distension and no mass. There is no abdominal tenderness. There is no rebound and no guarding.   Musculoskeletal:         General: No edema.     Skin: Skin is warm and dry. No rash noted. No erythema. No pallor.         ED Course   Procedures  Labs Reviewed   CBC W/ AUTO DIFFERENTIAL - Abnormal; Notable for the following components:       Result Value    WBC 14.84 (*)     Hemoglobin 13.3 (*)     MCV 71 (*)     MCH 22.1 (*)     MCHC 31.2 (*)     RDW 18.6 (*)     Gran # (ANC) 11.6 (*)     Immature Grans (Abs) 0.05 (*)     Gran % 78.4 (*)     Lymph % 12.2 (*)     All other components within normal limits   COMPREHENSIVE METABOLIC PANEL - Abnormal; Notable for the following components:    Potassium 3.0 (*)     Glucose 187 (*)     Calcium 8.5 (*)     Albumin 3.2 (*)     All other components within normal limits   LACTIC ACID, PLASMA - Abnormal; Notable for the following components:    Lactate (Lactic Acid) 3.1 (*)     All other components within normal limits   LIPASE - Abnormal; Notable for the following components:    Lipase 218 (*)     All other components within normal limits   TSH - Abnormal; Notable for the following components:    TSH 5.012 (*)     All other components within normal limits   URINALYSIS, REFLEX TO URINE CULTURE - Abnormal; Notable for the following components:    Protein, UA 2+ (*)     Occult Blood UA 1+ (*)     All other components within normal limits    Narrative:     Specimen Source->Urine   URINALYSIS MICROSCOPIC - Abnormal; Notable for the following components:    RBC, UA 22 (*)     Hyaline Casts, UA 17 (*)     All other components within normal limits    Narrative:     Specimen Source->Urine   ISTAT PROCEDURE - Abnormal; Notable for the following components:    POC PH 7.288 (*)     POC PCO2 61.2 (*)     POC PO2 64 (*)     POC HCO3 29.3 (*)     POC SATURATED O2 89 (*)     POC  TCO2 31 (*)     All other components within normal limits   ISTAT PROCEDURE - Abnormal; Notable for the following components:    POC Glucose 188 (*)     POC Potassium 3.0 (*)     All other components within normal limits   ISTAT PROCEDURE - Abnormal; Notable for the following components:    POC PH 7.331 (*)     POC PCO2 62.2 (*)     POC HCO3 32.9 (*)     POC SATURATED O2 86 (*)     POC TCO2 35 (*)     All other components within normal limits   CULTURE, BLOOD   CULTURE, BLOOD   DRUG SCREEN PANEL, URINE EMERGENCY    Narrative:     Specimen Source->Urine   MAGNESIUM   TROPONIN I   T4, FREE   B-TYPE NATRIURETIC PEPTIDE   B-TYPE NATRIURETIC PEPTIDE   SARS-COV-2 RDRP GENE   TYPE & SCREEN   ISTAT CHEM8     EKG Readings: (Independently Interpreted)   Sinus tachycardia rate of 116. RBBB. No ST elevations or depressions concerning for ischemia.  No STEMI per my independent interpretation         Imaging Results          CT Head Without Contrast (Final result)  Result time 05/12/22 20:20:00    Final result by Kishore Machuca MD (05/12/22 20:20:00)                 Impression:      1. No acute intracranial abnormalities noting stable sequela of chronic microvascular ischemic change, senescent change, and remote infarcts involving the left temporoparietal region and left thalamus.  2. There is stable asymmetry of the lateral ventricles, suggesting ex vacuo dilation.  3. Sinus disease as above.      Electronically signed by: Kishore Machuca MD  Date:    05/12/2022  Time:    20:20             Narrative:    EXAMINATION:  CT HEAD WITHOUT CONTRAST    CLINICAL HISTORY:  Mental status change, unknown cause;    TECHNIQUE:  Low dose axial images were obtained through the head.  Coronal and sagittal reformations were also performed. Contrast was not administered.    COMPARISON:  08/06/2021    FINDINGS:  There is motion artifact.    There is generalized cerebral volume loss.  There is hypoattenuation in a periventricular fashion, likely  sequela of chronic microvascular ischemic change.There is remote infarct involving the left thalamus.  There is encephalomalacia within the left parietal region/temporal region noting scattered punctate foci of calcification, stable.  There is no evidence of acute major vascular territory infarct, hemorrhage, or mass.  There is asymmetry of the ventricular system noting prominence of the left lateral ventricle, stable.  There are no abnormal extra-axial fluid collections.  There is mucous membrane thickening of the bilateral maxillary sinuses and patchy opacification of the ethmoid sinuses.  There are aerated secretions within the sphenoid sinuses.  There is mild mucous membrane thickening of the frontal sinuses.  There is patchy opacification of the inferior most mastoid air cells.  No acute displaced calvarial fracture.  The visualized soft tissues are unremarkable.                               X-Ray Chest 1 View (Final result)  Result time 05/12/22 20:00:13    Final result by Kishore Machuca MD (05/12/22 20:00:13)                 Impression:      1. Pulmonary findings suggest edema noting there may be developing left consolidation.  Correlation is advised.  Please see above.      Electronically signed by: Kishore Machuca MD  Date:    05/12/2022  Time:    20:00             Narrative:    EXAMINATION:  XR CHEST 1 VIEW    CLINICAL HISTORY:  Altered mental status, unspecified    TECHNIQUE:  Single frontal view of the chest was performed.    COMPARISON:  None    FINDINGS:  Patient's hand overlies the right hemithorax, limiting evaluation.    The cardiomediastinal silhouette is prominent.  There is obscuration of the left costophrenic angle suggesting effusion, the right costophrenic angle cannot be evaluated..  The trachea is midline.  The lungs are symmetrically expanded bilaterally with coarse interstitial attenuation bilaterally, suggesting edema.  May be developing left lower lung zone consolidation..  There is  no pneumothorax.  The osseous structures are remarkable for degenerative changes.  There is a large amount of gas and stool within the large bowel, correlation is advised..                              X-Rays:   Independently Interpreted Readings:   Other Readings:  Concern for left-sided consolidation    Medications   vancomycin 1.75 g in 5 % dextrose 500 mL IVPB (1,750 mg Intravenous New Bag 5/12/22 2057)   potassium chloride 10 mEq in 100 mL IVPB (has no administration in time range)   ondansetron injection 8 mg (8 mg Intravenous Given 5/12/22 1853)   naloxone injection 2 mg (2 mg Intravenous Given 5/12/22 1852)   sodium chloride 0.9% bolus 1,000 mL (0 mLs Intravenous Stopped 5/12/22 2055)   piperacillin-tazobactam 4.5 g in sodium chloride 0.9% 100 mL IVPB (ready to mix system) (0 g Intravenous Stopped 5/12/22 2057)   sodium chloride 0.9% bolus 500 mL (500 mLs Intravenous New Bag 5/12/22 2057)     Medical Decision Making:   History:   Old Medical Records: I decided to obtain old medical records.  Initial Assessment:   61-year-old male presenting to the ED with unresponsiveness.  Was breathing 6 times a minute, was being bagged with EMS.  Got CPR for an unknown amount time at nursing facility.  GCS three on arrival.      Differential includes was not limited to toxication, electrolyte abnormalities, ischemic stroke, hemorrhagic stroke, hypoglycemia,  Sepsis including pneumonia, UTI.      Patient given 2 mg Narcan, had good response, though had significant amount of vomiting subsequently.  Was talking, answering questions afterwards.  CBC had leukocytosis.  CMP showed no significant electrolyte abnormalities concerning hospitalization.  Did show hypokalemia 3.0.  Will replete.  Patient placed on 2.5 L nasal cannula.  Satting in the mid 90s.  Chest x-ray was concerning for possible left lower lobe pneumonia.  Covered with broad-spectrum antibiotics.  Lactic elevated at 3.1.  Given 1.5 L of fluids.  Did not give full  sepsis fluid bolus as patient has unknown EF.  UA showed concern for dehydration but no concern for UTI.  Urine drug screen negative.  CT head showed no concern for intracranial hemorrhage or ischemia.  Discussed with Hospital Medicine for admission.            Attending Attestation:   Physician Attestation Statement for Resident:  As the supervising MD   Physician Attestation Statement: I have personally seen and examined this patient.   I agree with the above history. -:   As the supervising MD I agree with the above PE.    As the supervising MD I agree with the above treatment, course, plan, and disposition.   -:   61-year-old male presenting to emergency department as reported out of hospital cardiac arrest, on arrival has pulse, stable vital signs, but breathing 60 times per minute.  He received Narcan, promptly woke up and had profuse vomiting.  Vital signs remarkable for tachycardia after Narcan administration, which gradually improved with hydration and nausea control.  His found have an elevated lactate and white blood cell count, and likely consolidation of his left long.  He received antibiotics and was admitted to Internal Medicine.  I have reviewed and agree with the residents interpretation of the following: lab data, x-rays, CT scans and EKG.  I have reviewed the following: old records at this facility.        Attending Critical Care:   Critical Care Times:   Direct Patient Care (initial evaluation, reassessments, and time considering the case)................................................................25 minutes.   Additional History from reviewing old medical records or taking additional history from the family, EMS, PCP, etc.......................5 minutes.   Ordering, Reviewing, and Interpreting Diagnostic Studies...............................................................................................................6 minutes.    Documentation..................................................................................................................................................................................3 minutes.   Consultation with other Physicians. .................................................................................................................................................5 minutes.   ==============================================================  · Total Critical Care Time - exclusive of procedural time: 44 minutes.  ==============================================================  Critical care was necessary to treat or prevent imminent or life-threatening deterioration of the following conditions: coma.   Critical care was time spent personally by me on the following activities: obtaining history from patient or relative, examination of patient, review of old charts, ordering lab, x-rays, and/or EKG, development of treatment plan with patient or relative, ordering and performing treatments and interventions, evaluation of patient's response to treatment, discussion with consultants and re-evaluation of patient's conition.   Critical Care Condition: critical           ED Course as of 05/12/22 2158   Thu May 12, 2022   2149 WBC(!): 14.84 [AU]   2149 Lactate, Kade(!): 3.1 [AU]      ED Course User Index  [AU] Tim Umanzor MD             Clinical Impression:   Final diagnoses:  [R41.89] Unresponsive (Primary)  [T40.2X4A] Opioid overdose, undetermined intent, initial encounter  [R41.82] AMS (altered mental status)  [K85.90] Acute pancreatitis, unspecified complication status, unspecified pancreatitis type  [E87.6] Hypokalemia  [J18.9] Pneumonia          ED Disposition Condition    Admit               Tim Umanzor MD  Resident  05/12/22 2158       Brittni Sharif MD  05/13/22 0027

## 2022-05-13 NOTE — HPI
Mr. Borges is a 62 yo M PMH COPD, CVA with residual R sided hemiplegia, opioid and alcohol abuse, venous thromboembolism, and ileus who presents from nursing home at [facility] for unresponsiveness. Patient received CPR but had a pulse on arrival. He was breathing 6 times a minute and pupils were normal size. Patient responded to narcan and had respiratory rate 12 requiring 2L NC. In my interview his last memory is yesterday and is unsure why he is in the hospital. Endorses feeling like he needs to cough. Denies fevers, chills, nausea, vomiting, chest pain, or abdominal pain.    In the ED he was given naloxone, zosyn, 1L LR bolus and 500cc LR bolus, vanc, zosyn, and potassium.

## 2022-05-13 NOTE — RESPIRATORY THERAPY
RAPID RESPONSE RESPIRATORY CHART CHECK       Chart check completed, instructed to call [phone] for further concerns or assistance.

## 2022-05-13 NOTE — H&P
[facility]  History & Physical    Patient Name: Anthony Borges  MRN: [id number]  Patient Class: IP- Inpatient  Admission Date: 5/12/2022  Attending Physician: Dinora Mcpherson*   Primary Care Provider: Bean Pearce MD         Patient information was obtained from past medical records, ER records and nursing home records.     Subjective:     Principal Problem:Sepsis    Chief Complaint:   Chief Complaint   Patient presents with    unresponsive     Arrives via EMS from [facility], call out for cardiac arrest with CPR in progress, pt had a pulse upon EMS arrival but breathing at a rate of 6 per minute, upon arrival to ED pt being bagged        HPI: Mr. Borges is a 60 yo M PMH COPD, CVA with residual R sided hemiplegia, opioid and alcohol abuse, venous thromboembolism, and ileus who presents from nursing home at [facility] for unresponsiveness. Patient received CPR but had a pulse on arrival. He was breathing 6 times a minute and pupils were normal size. Patient responded to narcan and had respiratory rate 12 requiring 2L NC. In my interview his last memory is yesterday and is unsure why he is in the hospital. Endorses feeling like he needs to cough. Denies fevers, chills, nausea, vomiting, chest pain, or abdominal pain.    In the ED he was given naloxone, zosyn, 1L LR bolus and 500cc LR bolus, vanc, zosyn, and potassium.        No past medical history on file.    No past surgical history on file.    Review of patient's allergies indicates:  No Known Allergies    No current facility-administered medications on file prior to encounter.     No current outpatient medications on file prior to encounter.     Family History    None       Tobacco Use    Smoking status: Not on file    Smokeless tobacco: Not on file   Substance and Sexual Activity    Alcohol use: Not on file    Drug use: Not on file    Sexual activity: Not on file     Review of Systems    Unable to perform ROS: Acuity of condition   Objective:     Vital Signs (Most Recent):  Temp: 98.6 °F (37 °C) (05/12/22 1941)  Pulse: 98 (05/12/22 2200)  Resp: 12 (05/12/22 1901)  BP: 110/72 (05/12/22 2200)  SpO2: 98 % (05/12/22 2200) Vital Signs (24h Range):  Temp:  [98.6 °F (37 °C)] 98.6 °F (37 °C)  Pulse:  [] 98  Resp:  [6-12] 12  SpO2:  [96 %-99 %] 98 %  BP: (110-148)/(72-89) 110/72     Weight: 92.2 kg (203 lb 4.2 oz)  Body mass index is 27.57 kg/m².    Physical Exam  Constitutional:       General: He is not in acute distress.     Appearance: He is ill-appearing. He is not toxic-appearing.   HENT:      Head: Normocephalic.      Mouth/Throat:      Mouth: Mucous membranes are dry.   Eyes:      General: No scleral icterus.     Pupils: Pupils are equal, round, and reactive to light.   Cardiovascular:      Rate and Rhythm: Tachycardia present.      Heart sounds: No murmur heard.  Pulmonary:      Effort: No respiratory distress.      Breath sounds: Wheezing, rhonchi and rales present.   Chest:      Chest wall: No tenderness.   Abdominal:      General: There is no distension.      Palpations: Abdomen is soft.      Tenderness: There is no abdominal tenderness. There is no guarding or rebound.   Musculoskeletal:      Cervical back: No rigidity.      Right lower leg: Edema present.      Left lower leg: Edema present.      Comments: 1+ pitting edema in lower extremities bilaterally   Skin:     General: Skin is dry.      Coloration: Skin is not jaundiced.      Findings: No rash.   Neurological:      Mental Status: He is alert. He is disoriented.      Cranial Nerves: No cranial nerve deficit.      Comments: R sided hemiplegia     Psychiatric:         Mood and Affect: Mood normal.         Behavior: Behavior normal.         CRANIAL NERVES     CN III, IV, VI   Pupils are equal, round, and reactive to light.     Significant Labs: All pertinent labs within the past 24 hours have been reviewed.    Significant Imaging: I  have reviewed all pertinent imaging results/findings within the past 24 hours.    Assessment/Plan:     * Sepsis  WBC 14.8, lactate 3.1, and  on admission. Received fluids and vanc/zosyn in ED. Source could be from pneumonia as there is a left sided consolidation. It is reasonable to consider aspiration pneumonia as patient was unresponsive earlier and reports of emesis although would be unlikely to develop on chest xray this quickly. Other possibility for today's events could be syncopal event. Will follow up with echo    Vanc/unasyn  F/u blood cultures  F/u viral sputum  F/u echo  F/u repeat echo  Continuous fluids  Telemetry      Elevated lipase  Lipase 218 on admission  No complaints of abdominal or back pain at this time. Unclear baseline lipase. Patient is being treated with fluids for sepsis. If acute abdominal changes CT      History of DVT (deep vein thrombosis)  Reported history of DVT with embolism from nursing home    Continue home xeralto 20mg daily      Ileus  Reported ileus from nursing home. Large amount of gas and stool in large bowel on xray    Continue home metoclopramide      History of CVA (cerebrovascular accident)  History of CVA with residual right sided hemiplegia    Continue home keppra 500 PO BID      Hypertension  Takes bumetanide and metoprolol at home  Holding in setting of sepsis      COPD (chronic obstructive pulmonary disease)  Continue home albuterol        VTE Risk Mitigation (From admission, onward)         Ordered     IP VTE HIGH RISK PATIENT  Once         05/12/22 2218     Place sequential compression device  Until discontinued         05/12/22 2218                   Jennie Montana MD  Department of Hospital Medicine   [facility] - Telemetry Stepdown

## 2022-05-13 NOTE — PROGRESS NOTES
Pt able to speak and follow commands no issues with swallowing. Dr. Montana requested to assess pt swallowing.Passed.

## 2022-05-13 NOTE — PHARMACY MED REC
"Admission Medication History     The home medication history was taken by Katie Strauss.    You may go to "Admission" then "Reconcile Home Medications" tabs to review and/or act upon these items.      The home medication list has been updated by the Pharmacy department.    Please read ALL comments highlighted in yellow.    Please address this information as you see fit.     Feel free to contact us if you have any questions or require assistance.        Medications listed below were obtained from: Nursing home  PTA Medications   Medication Sig    acetaminophen (TYLENOL) 325 MG tablet Take 325 mg by mouth every 4 (four) hours as needed (headache/ fever).    albuterol (PROAIR HFA) 90 mcg/actuation inhaler Inhale 2 puffs into the lungs every 4 (four) hours as needed for Wheezing. Rescue    aluminum-magnesium hydroxide-simethicone (MAALOX) 200-200-20 mg/5 mL Susp Take 30 mLs by mouth every 4 (four) hours as needed (dyspepsia).    ascorbic acid, vitamin C, (VITAMIN C) 500 MG tablet Take 500 mg by mouth once daily.    atorvastatin (LIPITOR) 20 MG tablet Take 20 mg by mouth every evening.    bisacodyL (DULCOLAX) 10 mg Supp Place 10 mg rectally daily as needed (constipation).    brimonidine 0.2% (ALPHAGAN) 0.2 % Drop Place 1 drop into both eyes 2 (two) times a day.    bumetanide (BUMEX) 2 MG tablet Take 2 mg by mouth once daily.    ergocalciferol (ERGOCALCIFEROL) 50,000 unit Cap Take 50,000 Units by mouth every Thursday.    famotidine (PEPCID) 20 MG tablet Take 20 mg by mouth once daily.    guaiFENesin (MUCINEX) 600 mg 12 hr tablet Take 600 mg by mouth 2 (two) times daily.    levETIRAcetam (KEPPRA) 500 MG Tab Take 500 mg by mouth 2 (two) times daily.    metoclopramide HCl (REGLAN) 5 mg/5 mL Soln Take 5 mg by mouth every 8 (eight) hours as needed (ileus issues).    metoprolol tartrate (LOPRESSOR) 25 MG tablet Take 25 mg by mouth 2 (two) times daily.    multivitamin (ONE DAILY MULTIVITAMIN) per tablet Take 1 " tablet by mouth once daily.    phenylephrine/diphenhydramine (DIPHENHYDRAMINE-PHENYLEPHRINE ORAL) Take 10 mLs by mouth 3 (three) times daily as needed (cough).    potassium chloride SA (K-DUR,KLOR-CON) 20 MEQ tablet Take 20 mEq by mouth 2 (two) times daily.    senna (SENOKOT) 8.6 mg tablet Take 2 tablets by mouth daily as needed for Constipation.    travoprost, benzalkonium, (TRAVATAN) 0.004 % ophthalmic solution Place 1 drop into both eyes every evening.       Potential issues to be addressed PRIOR TO DISCHARGE  The listed medications were obtained from another facility ([facility]). The patient may not have been able to fill these prescriptions prior to this admission and may require new scripts upon discharge.     Katie Strauss  [id number]                  .

## 2022-05-13 NOTE — PLAN OF CARE
[facility] - Telemetry Stepdown  Initial Discharge Assessment       Primary Care Provider: Bean Pearce MD    Admission Diagnosis: Hypokalemia [E87.6]  Pneumonia [J18.9]  Unresponsive [R41.89]  Chest pain [R07.9]  Sepsis [A41.9]  Opioid overdose, undetermined intent, initial encounter [T40.2X4A]  Acute pancreatitis, unspecified complication status, unspecified pancreatitis type [K85.90]  AMS (altered mental status) [R41.82]    Admission Date: 5/12/2022  Expected Discharge Date: 5/16/2022    Discharge Barriers Identified: None    Payor: MEDICAID / Plan: MEDICAID OF [place] / Product Type: Government /     Extended Emergency Contact Information  Primary Emergency Contact: LyssaDalia   [facility]  Home Phone: [phone]  Relation: Sister  Secondary Emergency Contact: Kelly Borges  Address: [address] [place] [id number] [place]  Mobile Phone: [phone]  Relation: Daughter    Discharge Plan A: Return to nursing home  Discharge Plan B: Return to Nursing Home    No Pharmacies Listed    Initial Assessment (most recent)     Adult Discharge Assessment - 05/13/22 1509        Discharge Assessment    Assessment Type Discharge Planning Assessment     Confirmed/corrected address, phone number and insurance Yes     Confirmed Demographics Correct on Facesheet     Source of Information patient;family     Reason For Admission Sepsis     Lives With facility resident     Facility Arrived From: [facility]     Do you expect to return to your current living situation? Yes     Do you have help at home or someone to help you manage your care at home? Yes     Who are your caregiver(s) and their phone number(s)? NH staff     Walking or Climbing Stairs Difficulty ambulation difficulty, dependent;stair climbing difficulty, dependent;transferring difficulty, dependent     Dressing/Bathing Difficulty bathing difficulty, dependent;dressing difficulty, dependent     Home  Accessibility wheelchair accessible     Home Layout Able to live on 1st floor     Equipment Currently Used at Home wheelchair;hospital bed;lift device     Readmission within 30 days? No     Patient currently being followed by outpatient case management? No     Who is going to help you get home at discharge? Ambulance transport     How do you get to doctors appointments? agency     Discharge Plan A Return to nursing home     Discharge Plan B Return to Nursing Home     DME Needed Upon Discharge  none     Discharge Plan discussed with: Adult children     Discharge Barriers Identified None                05/13/22 3315   Post-Acute Status   Post-Acute Authorization Placement   Post-Acute Placement Status Pending medical clearance/testing  ([facility]- current resident)     Krista Andrews LMSW  [facility]  Ext. [phone]

## 2022-05-13 NOTE — ED NOTES
I-STAT Chem-8+ Results:   Value Reference Range   Sodium 141 136-145 mmol/L   Potassium  3.0 3.5-5.1 mmol/L   Chloride 101  mmol/L   Ionized Calcium 1.06 1.06-1.42 mmol/L   CO2 (measured) 26 23-29 mmol/L   Glucose 188  mg/dL   BUN 9 6-30 mg/dL   Creatinine 0.7 0.5-1.4 mg/dL   Hematocrit 43 36-54%

## 2022-05-13 NOTE — ASSESSMENT & PLAN NOTE
WBC 14.8, lactate 3.1, and  on admission. Received fluids and vanc/zosyn in ED. Source could be from pneumonia as there is a left sided consolidation. It is reasonable to consider aspiration pneumonia as patient was unresponsive earlier and reports of emesis although would be unlikely to develop on chest xray this quickly. Other possibility for today's events could be syncopal event. Will follow up with echo    Vanc/unasyn  F/u blood cultures  F/u viral sputum  F/u echo  F/u repeat echo  Continuous fluids  Telemetry

## 2022-05-13 NOTE — SUBJECTIVE & OBJECTIVE
No past medical history on file.    No past surgical history on file.    Review of patient's allergies indicates:  No Known Allergies    No current facility-administered medications on file prior to encounter.     No current outpatient medications on file prior to encounter.     Family History    None       Tobacco Use    Smoking status: Not on file    Smokeless tobacco: Not on file   Substance and Sexual Activity    Alcohol use: Not on file    Drug use: Not on file    Sexual activity: Not on file     Review of Systems   Unable to perform ROS: Acuity of condition   Objective:     Vital Signs (Most Recent):  Temp: 98.6 °F (37 °C) (05/12/22 1941)  Pulse: 98 (05/12/22 2200)  Resp: 12 (05/12/22 1901)  BP: 110/72 (05/12/22 2200)  SpO2: 98 % (05/12/22 2200) Vital Signs (24h Range):  Temp:  [98.6 °F (37 °C)] 98.6 °F (37 °C)  Pulse:  [] 98  Resp:  [6-12] 12  SpO2:  [96 %-99 %] 98 %  BP: (110-148)/(72-89) 110/72     Weight: 92.2 kg (203 lb 4.2 oz)  Body mass index is 27.57 kg/m².    Physical Exam  Constitutional:       General: He is not in acute distress.     Appearance: He is ill-appearing. He is not toxic-appearing.   HENT:      Head: Normocephalic.      Mouth/Throat:      Mouth: Mucous membranes are dry.   Eyes:      General: No scleral icterus.     Pupils: Pupils are equal, round, and reactive to light.   Cardiovascular:      Rate and Rhythm: Tachycardia present.      Heart sounds: No murmur heard.  Pulmonary:      Effort: No respiratory distress.      Breath sounds: Wheezing, rhonchi and rales present.   Chest:      Chest wall: No tenderness.   Abdominal:      General: There is no distension.      Palpations: Abdomen is soft.      Tenderness: There is no abdominal tenderness. There is no guarding or rebound.   Musculoskeletal:      Cervical back: No rigidity.      Right lower leg: Edema present.      Left lower leg: Edema present.      Comments: 1+ pitting edema in lower extremities bilaterally   Skin:      General: Skin is dry.      Coloration: Skin is not jaundiced.      Findings: No rash.   Neurological:      Mental Status: He is alert. He is disoriented.      Cranial Nerves: No cranial nerve deficit.      Comments: R sided hemiplegia     Psychiatric:         Mood and Affect: Mood normal.         Behavior: Behavior normal.         CRANIAL NERVES     CN III, IV, VI   Pupils are equal, round, and reactive to light.     Significant Labs: All pertinent labs within the past 24 hours have been reviewed.    Significant Imaging: I have reviewed all pertinent imaging results/findings within the past 24 hours.

## 2022-05-13 NOTE — PLAN OF CARE
Received patient at change of shift. Pt aaox2. Vss stable. Pt on 2L nc. Pt denies pain. LR running at 100. Pt tolerating sips of water with meds. No distress. Call bell in reach. Rn will continue to monitor.     Problem: Adult Inpatient Plan of Care  Goal: Plan of Care Review  Outcome: Ongoing, Progressing  Goal: Patient-Specific Goal (Individualized)  Outcome: Ongoing, Progressing  Goal: Absence of Hospital-Acquired Illness or Injury  Outcome: Ongoing, Progressing  Goal: Optimal Comfort and Wellbeing  Outcome: Ongoing, Progressing  Goal: Readiness for Transition of Care  Outcome: Ongoing, Progressing     Problem: Adjustment to Illness (Sepsis/Septic Shock)  Goal: Optimal Coping  Outcome: Ongoing, Progressing     Problem: Bleeding (Sepsis/Septic Shock)  Goal: Absence of Bleeding  Outcome: Ongoing, Progressing     Problem: Glycemic Control Impaired (Sepsis/Septic Shock)  Goal: Blood Glucose Level Within Desired Range  Outcome: Ongoing, Progressing     Problem: Infection Progression (Sepsis/Septic Shock)  Goal: Absence of Infection Signs and Symptoms  Outcome: Ongoing, Progressing     Problem: Nutrition Impaired (Sepsis/Septic Shock)  Goal: Optimal Nutrition Intake  Outcome: Ongoing, Progressing     Problem: Skin Injury Risk Increased  Goal: Skin Health and Integrity  Outcome: Ongoing, Progressing     Problem: Impaired Wound Healing  Goal: Optimal Wound Healing  Outcome: Ongoing, Progressing

## 2022-05-13 NOTE — ASSESSMENT & PLAN NOTE
History of CVA with residual right sided hemiplegia    Continue home keppra 500 PO BID     Odilia Putnam

## 2022-05-14 LAB
ALBUMIN SERPL BCP-MCNC: 2.9 G/DL (ref 3.5–5.2)
ALP SERPL-CCNC: 97 U/L (ref 55–135)
ALT SERPL W/O P-5'-P-CCNC: 15 U/L (ref 10–44)
ANION GAP SERPL CALC-SCNC: 8 MMOL/L (ref 8–16)
AST SERPL-CCNC: 15 U/L (ref 10–40)
BASOPHILS # BLD AUTO: 0.04 K/UL (ref 0–0.2)
BASOPHILS NFR BLD: 0.4 % (ref 0–1.9)
BILIRUB SERPL-MCNC: 0.4 MG/DL (ref 0.1–1)
BUN SERPL-MCNC: 4 MG/DL (ref 6–20)
CALCIUM SERPL-MCNC: 8.5 MG/DL (ref 8.7–10.5)
CHLORIDE SERPL-SCNC: 105 MMOL/L (ref 95–110)
CO2 SERPL-SCNC: 28 MMOL/L (ref 23–29)
CREAT SERPL-MCNC: 0.6 MG/DL (ref 0.5–1.4)
DIFFERENTIAL METHOD: ABNORMAL
EOSINOPHIL # BLD AUTO: 0.6 K/UL (ref 0–0.5)
EOSINOPHIL NFR BLD: 6.3 % (ref 0–8)
ERYTHROCYTE [DISTWIDTH] IN BLOOD BY AUTOMATED COUNT: 18.5 % (ref 11.5–14.5)
EST. GFR  (AFRICAN AMERICAN): >60 ML/MIN/1.73 M^2
EST. GFR  (NON AFRICAN AMERICAN): >60 ML/MIN/1.73 M^2
GLUCOSE SERPL-MCNC: 70 MG/DL (ref 70–110)
HCT VFR BLD AUTO: 43.4 % (ref 40–54)
HGB BLD-MCNC: 13.2 G/DL (ref 14–18)
IMM GRANULOCYTES # BLD AUTO: 0.02 K/UL (ref 0–0.04)
IMM GRANULOCYTES NFR BLD AUTO: 0.2 % (ref 0–0.5)
LYMPHOCYTES # BLD AUTO: 2 K/UL (ref 1–4.8)
LYMPHOCYTES NFR BLD: 22.4 % (ref 18–48)
MAGNESIUM SERPL-MCNC: 1.6 MG/DL (ref 1.6–2.6)
MCH RBC QN AUTO: 21.6 PG (ref 27–31)
MCHC RBC AUTO-ENTMCNC: 30.4 G/DL (ref 32–36)
MCV RBC AUTO: 71 FL (ref 82–98)
MONOCYTES # BLD AUTO: 1 K/UL (ref 0.3–1)
MONOCYTES NFR BLD: 11.1 % (ref 4–15)
NEUTROPHILS # BLD AUTO: 5.4 K/UL (ref 1.8–7.7)
NEUTROPHILS NFR BLD: 59.6 % (ref 38–73)
NRBC BLD-RTO: 0 /100 WBC
PHOSPHATE SERPL-MCNC: 3.1 MG/DL (ref 2.7–4.5)
PLATELET # BLD AUTO: 284 K/UL (ref 150–450)
PMV BLD AUTO: 11.5 FL (ref 9.2–12.9)
POTASSIUM SERPL-SCNC: 4.4 MMOL/L (ref 3.5–5.1)
PROT SERPL-MCNC: 5.9 G/DL (ref 6–8.4)
RBC # BLD AUTO: 6.11 M/UL (ref 4.6–6.2)
SODIUM SERPL-SCNC: 141 MMOL/L (ref 136–145)
WBC # BLD AUTO: 9.12 K/UL (ref 3.9–12.7)

## 2022-05-14 PROCEDURE — 25000003 PHARM REV CODE 250: Performed by: HOSPITALIST

## 2022-05-14 PROCEDURE — 87040 BLOOD CULTURE FOR BACTERIA: CPT | Mod: 59 | Performed by: STUDENT IN AN ORGANIZED HEALTH CARE EDUCATION/TRAINING PROGRAM

## 2022-05-14 PROCEDURE — 85025 COMPLETE CBC W/AUTO DIFF WBC: CPT

## 2022-05-14 PROCEDURE — 63600175 PHARM REV CODE 636 W HCPCS: Performed by: HOSPITALIST

## 2022-05-14 PROCEDURE — 20600001 HC STEP DOWN PRIVATE ROOM

## 2022-05-14 PROCEDURE — 63600175 PHARM REV CODE 636 W HCPCS

## 2022-05-14 PROCEDURE — 25000003 PHARM REV CODE 250

## 2022-05-14 PROCEDURE — 99233 SBSQ HOSP IP/OBS HIGH 50: CPT | Mod: ,,, | Performed by: HOSPITALIST

## 2022-05-14 PROCEDURE — 84100 ASSAY OF PHOSPHORUS: CPT

## 2022-05-14 PROCEDURE — 80053 COMPREHEN METABOLIC PANEL: CPT

## 2022-05-14 PROCEDURE — 36415 COLL VENOUS BLD VENIPUNCTURE: CPT | Performed by: STUDENT IN AN ORGANIZED HEALTH CARE EDUCATION/TRAINING PROGRAM

## 2022-05-14 PROCEDURE — 25000003 PHARM REV CODE 250: Performed by: STUDENT IN AN ORGANIZED HEALTH CARE EDUCATION/TRAINING PROGRAM

## 2022-05-14 PROCEDURE — 99233 PR SUBSEQUENT HOSPITAL CARE,LEVL III: ICD-10-PCS | Mod: ,,, | Performed by: HOSPITALIST

## 2022-05-14 PROCEDURE — 83735 ASSAY OF MAGNESIUM: CPT

## 2022-05-14 RX ORDER — DOXYCYCLINE HYCLATE 100 MG
100 TABLET ORAL EVERY 12 HOURS
Status: DISCONTINUED | OUTPATIENT
Start: 2022-05-14 | End: 2022-05-17 | Stop reason: HOSPADM

## 2022-05-14 RX ORDER — METOCLOPRAMIDE HYDROCHLORIDE 5 MG/ML
5 INJECTION INTRAMUSCULAR; INTRAVENOUS 3 TIMES DAILY PRN
Status: DISCONTINUED | OUTPATIENT
Start: 2022-05-14 | End: 2022-05-17 | Stop reason: HOSPADM

## 2022-05-14 RX ORDER — ENOXAPARIN SODIUM 100 MG/ML
40 INJECTION SUBCUTANEOUS EVERY 24 HOURS
Status: DISCONTINUED | OUTPATIENT
Start: 2022-05-14 | End: 2022-05-17 | Stop reason: HOSPADM

## 2022-05-14 RX ADMIN — ATORVASTATIN CALCIUM 20 MG: 20 TABLET, FILM COATED ORAL at 08:05

## 2022-05-14 RX ADMIN — FAMOTIDINE 20 MG: 20 TABLET ORAL at 08:05

## 2022-05-14 RX ADMIN — SODIUM CHLORIDE, SODIUM LACTATE, POTASSIUM CHLORIDE, AND CALCIUM CHLORIDE: .6; .31; .03; .02 INJECTION, SOLUTION INTRAVENOUS at 08:05

## 2022-05-14 RX ADMIN — POTASSIUM CHLORIDE 20 MEQ: 1500 TABLET, EXTENDED RELEASE ORAL at 08:05

## 2022-05-14 RX ADMIN — VANCOMYCIN HYDROCHLORIDE 1750 MG: 10 INJECTION, POWDER, LYOPHILIZED, FOR SOLUTION INTRAVENOUS at 08:05

## 2022-05-14 RX ADMIN — DOXYCYCLINE HYCLATE 100 MG: 100 TABLET, COATED ORAL at 08:05

## 2022-05-14 RX ADMIN — METOCLOPRAMIDE 5 MG: 5 INJECTION, SOLUTION INTRAMUSCULAR; INTRAVENOUS at 08:05

## 2022-05-14 RX ADMIN — LEVETIRACETAM 500 MG: 500 TABLET, FILM COATED ORAL at 08:05

## 2022-05-14 RX ADMIN — AMPICILLIN AND SULBACTAM 1.5 G: 1; .5 INJECTION, POWDER, FOR SOLUTION INTRAVENOUS at 01:05

## 2022-05-14 RX ADMIN — ENOXAPARIN SODIUM 40 MG: 100 INJECTION SUBCUTANEOUS at 05:05

## 2022-05-14 RX ADMIN — VANCOMYCIN HYDROCHLORIDE 1500 MG: 1.5 INJECTION, POWDER, LYOPHILIZED, FOR SOLUTION INTRAVENOUS at 08:05

## 2022-05-14 RX ADMIN — AMPICILLIN AND SULBACTAM 1.5 G: 1; .5 INJECTION, POWDER, FOR SOLUTION INTRAVENOUS at 06:05

## 2022-05-14 RX ADMIN — SODIUM CHLORIDE, SODIUM LACTATE, POTASSIUM CHLORIDE, AND CALCIUM CHLORIDE: .6; .31; .03; .02 INJECTION, SOLUTION INTRAVENOUS at 06:05

## 2022-05-14 RX ADMIN — TRAVOPROST 1 DROP: 0.04 SOLUTION/ DROPS OPHTHALMIC at 09:05

## 2022-05-14 NOTE — PLAN OF CARE
Received patient at change of shift. Pt aaox2. Vss stable. Pt denies pain. No distress. Pt kept clean dry intact. Bed alarm on. Set up assist. Q2 hr turns. Call bell in reach. RN will continue to monitor.     Problem: Adult Inpatient Plan of Care  Goal: Plan of Care Review  Outcome: Ongoing, Progressing  Goal: Patient-Specific Goal (Individualized)  Outcome: Ongoing, Progressing  Goal: Absence of Hospital-Acquired Illness or Injury  Outcome: Ongoing, Progressing  Goal: Optimal Comfort and Wellbeing  Outcome: Ongoing, Progressing  Goal: Readiness for Transition of Care  Outcome: Ongoing, Progressing     Problem: Adjustment to Illness (Sepsis/Septic Shock)  Goal: Optimal Coping  Outcome: Ongoing, Progressing     Problem: Bleeding (Sepsis/Septic Shock)  Goal: Absence of Bleeding  Outcome: Ongoing, Progressing     Problem: Glycemic Control Impaired (Sepsis/Septic Shock)  Goal: Blood Glucose Level Within Desired Range  Outcome: Ongoing, Progressing     Problem: Infection Progression (Sepsis/Septic Shock)  Goal: Absence of Infection Signs and Symptoms  Outcome: Ongoing, Progressing     Problem: Nutrition Impaired (Sepsis/Septic Shock)  Goal: Optimal Nutrition Intake  Outcome: Ongoing, Progressing     Problem: Skin Injury Risk Increased  Goal: Skin Health and Integrity  Outcome: Ongoing, Progressing     Problem: Impaired Wound Healing  Goal: Optimal Wound Healing  Outcome: Ongoing, Progressing

## 2022-05-14 NOTE — ASSESSMENT & PLAN NOTE
WBC 14.8, lactate 3.1, and  on admission. Received fluids and vanc/zosyn in ED. Source could be from pneumonia as there is a left sided consolidation. It is reasonable to consider aspiration pneumonia as patient was unresponsive earlier and reports of emesis although would be unlikely to develop on chest xray this quickly. Other possibility for today's events could be syncopal event.    -- Bcx resulted positive for GPC resembling Staph.   -- Vanc transitioned to Unasyn.   -- Continue doxy for possible pna.   -- TTE unremarkable.   -- F/u Cx speciation. If Staph aureus, consult ID and +/- HAILE.

## 2022-05-14 NOTE — PLAN OF CARE
Problem: Skin Injury Risk Increased  Goal: Skin Health and Integrity  Outcome: Ongoing, Progressing  Intervention: Optimize Skin Protection  Flowsheets (Taken 5/14/2022 0522)  Pressure Reduction Techniques:   frequent weight shift encouraged   weight shift assistance provided  Pressure Reduction Devices: positioning supports utilized  Skin Protection:   adhesive use limited   incontinence pads utilized   tubing/devices free from skin contact  Head of Bed (HOB) Positioning: HOB elevated  Intervention: Promote and Optimize Oral Intake  Flowsheets (Taken 5/14/2022 0522)  Oral Nutrition Promotion:   rest periods promoted   safe use of adaptive equipment encouraged   social interaction promoted     Problem: Impaired Wound Healing  Goal: Optimal Wound Healing  Outcome: Ongoing, Progressing  Intervention: Promote Wound Healing  Flowsheets (Taken 5/14/2022 0522)  Oral Nutrition Promotion:   rest periods promoted   safe use of adaptive equipment encouraged   social interaction promoted  Activity Management: Rolling - L1  Pain Management Interventions:   pillow support provided   position adjusted

## 2022-05-14 NOTE — PROGRESS NOTES
[facility] - Telemetry [facility] Medicine  Progress Note    Patient Name: Anthony Borges  MRN: [id number]  Patient Class: IP- Inpatient   Admission Date: 5/12/2022  Length of Stay: 2 days  Attending Physician: Dinora Mcpherson*  Primary Care Provider: Bean Pearce MD        Subjective:     Principal Problem:Sepsis        HPI:  Mr. Borges is a 62 yo M PMH COPD, CVA with residual R sided hemiplegia, opioid and alcohol abuse, venous thromboembolism, and ileus who presents from nursing home at [facility] for unresponsiveness. Patient received CPR but had a pulse on arrival. He was breathing 6 times a minute and pupils were normal size. Patient responded to narcan and had respiratory rate 12 requiring 2L NC. In my interview his last memory is yesterday and is unsure why he is in the hospital. Endorses feeling like he needs to cough. Denies fevers, chills, nausea, vomiting, chest pain, or abdominal pain.    In the ED he was given naloxone, zosyn, 1L LR bolus and 500cc LR bolus, vanc, zosyn, and potassium.        Overview/Hospital Course:  Admitted with sepsis possibly 2/2 pna. Bcx resulted positive for GPC resembling Staph. Treated with Unasyn, doxy. TTE unremarkable. Speciation pending. If Staph aureus, consult ID and +/- HAIEL.      Interval History: Bcx resulted positive for GPC resembling Staph. Vanc transitioned to Unasyn. Continue doxy for possible pna. TTE unremarkable. Speciation pending. If Staph aureus, consult ID and +/- HAILE.    Review of Systems   Unable to perform ROS: Acuity of condition   Objective:     Vital Signs (Most Recent):  Temp: 98.4 °F (36.9 °C) (05/14/22 1123)  Pulse: 83 (05/14/22 1143)  Resp: 18 (05/14/22 1123)  BP: 122/64 (05/14/22 1123)  SpO2: 98 % (05/14/22 1143) Vital Signs (24h Range):  Temp:  [97.8 °F (36.6 °C)-98.4 °F (36.9 °C)] 98.4 °F (36.9 °C)  Pulse:  [83-96] 83  Resp:  [16-21] 18  SpO2:  [94 %-100 %] 98 %  BP: (105-137)/(57-76) 122/64     Weight: 92.1 kg  (203 lb)  Body mass index is 26.78 kg/m².    Physical Exam  Constitutional:       General: He is not in acute distress.     Appearance: He is ill-appearing. He is not toxic-appearing.   HENT:      Head: Normocephalic.      Mouth/Throat:      Mouth: Mucous membranes are dry.   Eyes:      General: No scleral icterus.     Pupils: Pupils are equal, round, and reactive to light.   Cardiovascular:      Rate and Rhythm: Tachycardia present.      Heart sounds: No murmur heard.  Pulmonary:      Effort: No respiratory distress.      Breath sounds: Wheezing, rhonchi and rales present.   Chest:      Chest wall: No tenderness.   Abdominal:      General: There is no distension.      Palpations: Abdomen is soft.      Tenderness: There is no abdominal tenderness. There is no guarding or rebound.   Musculoskeletal:      Cervical back: No rigidity.      Right lower leg: Edema present.      Left lower leg: Edema present.      Comments: 1+ pitting edema in lower extremities bilaterally   Skin:     General: Skin is dry.      Coloration: Skin is not jaundiced.      Findings: No rash.   Neurological:      Mental Status: He is alert. He is disoriented.      Cranial Nerves: No cranial nerve deficit.      Comments: R sided hemiplegia     Psychiatric:         Mood and Affect: Mood normal.         Behavior: Behavior normal.         CRANIAL NERVES     CN III, IV, VI   Pupils are equal, round, and reactive to light.     Significant Labs: All pertinent labs within the past 24 hours have been reviewed.    Significant Imaging: I have reviewed all pertinent imaging results/findings within the past 24 hours.      Assessment/Plan:      * Sepsis  WBC 14.8, lactate 3.1, and  on admission. Received fluids and vanc/zosyn in ED. Source could be from pneumonia as there is a left sided consolidation. It is reasonable to consider aspiration pneumonia as patient was unresponsive earlier and reports of emesis although would be unlikely to develop on chest  xray this quickly. Other possibility for today's events could be syncopal event.    -- Bcx resulted positive for GPC resembling Staph.   -- Vanc transitioned to Unasyn.   -- Continue doxy for possible pna.   -- TTE unremarkable.   -- F/u Cx speciation. If Staph aureus, consult ID and +/- HAILE.    Elevated lipase  Lipase 218 on admission  No complaints of abdominal or back pain at this time. Unclear baseline lipase. Patient is being treated with fluids for sepsis. If acute abdominal changes CT      History of DVT (deep vein thrombosis)  Reported history of DVT with embolism from nursing home    Continue home xeralto 20mg daily      Ileus  Reported ileus from nursing home. Large amount of gas and stool in large bowel on xray    Continue home metoclopramide      History of CVA (cerebrovascular accident)  History of CVA with residual right sided hemiplegia    Continue home keppra 500 PO BID      Hypertension  Takes bumetanide and metoprolol at home  Holding in setting of sepsis      COPD (chronic obstructive pulmonary disease)  Continue home albuterol        VTE Risk Mitigation (From admission, onward)         Ordered     enoxaparin injection 40 mg  Daily         05/14/22 1056     IP VTE HIGH RISK PATIENT  Once         05/12/22 2218     Place sequential compression device  Until discontinued         05/12/22 2218                Discharge Planning   PAT: 5/16/2022     Code Status: Full Code   Is the patient medically ready for discharge?: No    Reason for patient still in hospital (select all that apply): Patient trending condition  Discharge Plan A: Return to nursing home                  Nadine Le DO  Department of Hospital Medicine   [facility] - Telemetry Stepdown

## 2022-05-14 NOTE — SUBJECTIVE & OBJECTIVE
Interval History: Bcx resulted positive for GPC resembling Staph. Vanc transitioned to Unasyn. Continue doxy for possible pna. TTE unremarkable. Speciation pending. If Staph aureus, consult ID and +/- HAILE.    Review of Systems   Unable to perform ROS: Acuity of condition   Objective:     Vital Signs (Most Recent):  Temp: 98.4 °F (36.9 °C) (05/14/22 1123)  Pulse: 83 (05/14/22 1143)  Resp: 18 (05/14/22 1123)  BP: 122/64 (05/14/22 1123)  SpO2: 98 % (05/14/22 1143) Vital Signs (24h Range):  Temp:  [97.8 °F (36.6 °C)-98.4 °F (36.9 °C)] 98.4 °F (36.9 °C)  Pulse:  [83-96] 83  Resp:  [16-21] 18  SpO2:  [94 %-100 %] 98 %  BP: (105-137)/(57-76) 122/64     Weight: 92.1 kg (203 lb)  Body mass index is 26.78 kg/m².    Physical Exam  Constitutional:       General: He is not in acute distress.     Appearance: He is ill-appearing. He is not toxic-appearing.   HENT:      Head: Normocephalic.      Mouth/Throat:      Mouth: Mucous membranes are dry.   Eyes:      General: No scleral icterus.     Pupils: Pupils are equal, round, and reactive to light.   Cardiovascular:      Rate and Rhythm: Tachycardia present.      Heart sounds: No murmur heard.  Pulmonary:      Effort: No respiratory distress.      Breath sounds: Wheezing, rhonchi and rales present.   Chest:      Chest wall: No tenderness.   Abdominal:      General: There is no distension.      Palpations: Abdomen is soft.      Tenderness: There is no abdominal tenderness. There is no guarding or rebound.   Musculoskeletal:      Cervical back: No rigidity.      Right lower leg: Edema present.      Left lower leg: Edema present.      Comments: 1+ pitting edema in lower extremities bilaterally   Skin:     General: Skin is dry.      Coloration: Skin is not jaundiced.      Findings: No rash.   Neurological:      Mental Status: He is alert. He is disoriented.      Cranial Nerves: No cranial nerve deficit.      Comments: R sided hemiplegia     Psychiatric:         Mood and Affect: Mood  normal.         Behavior: Behavior normal.         CRANIAL NERVES     CN III, IV, VI   Pupils are equal, round, and reactive to light.     Significant Labs: All pertinent labs within the past 24 hours have been reviewed.    Significant Imaging: I have reviewed all pertinent imaging results/findings within the past 24 hours.

## 2022-05-14 NOTE — HOSPITAL COURSE
Admitted with sepsis possibly 2/2 pna. Bcx resulted positive for GPC resembling Staph. Treated with Unasyn, doxy. TTE unremarkable. Speciation pending. Pt transitioned to vanc/doxy. Continues to improve clinically. On 5/15 Bcx positive for Staph Hominis, pan-sensitive. Started on Ancef, discontinued vanc on 5/16. Consult placed for midline. On 5/17 patient stable for discharge back to NH with 2 week course if IV abx.

## 2022-05-14 NOTE — CONSULTS
Pharmacokinetic Initial Assessment: IV Vancomycin    Assessment/Plan:    GPCs resembling staph in 2/2 blood cx drawn 5/12. Patient did receive vancomycin 1750 mg IV x 1 on 5/11    1. Vancomycin 1750 mg (20 mg/kg) IV x 1, followed by 1500 mg (15 mg/kg) IV q12h  2. Trough 5/15 @ 0800  3. Goal trough = 15-20 mcg/mL    Thank you for the consult, will continue to follow  Corry RosalesD., BCPS  [id number]       Patient brief summary:  Anthony Borges is a 61 y.o. male initiated on antimicrobial therapy with IV Vancomycin for treatment of Staph bacteremia     Drug Allergies:   Review of patient's allergies indicates:  No Known Allergies    Actual Body Weight:   92.1 kg    Renal Function:   Estimated Creatinine Clearance: 148 mL/min (based on SCr of 0.6 mg/dL).,     CBC (last 72 hours):  Recent Labs   Lab Result Units 05/12/22 1921 05/13/22  0533   WBC K/uL 14.84* 11.22   Hemoglobin g/dL 13.3* 13.0*   Hematocrit % 42.6 42.1   Platelets K/uL 326 299   Gran % % 78.4* 68.9   Lymph % % 12.2* 18.8   Mono % % 6.5 11.2   Eosinophil % % 2.3 0.4   Basophil % % 0.3 0.4   Differential Method  Automated Automated       Metabolic Panel (last 72 hours):  Recent Labs   Lab Result Units 05/12/22 1921 05/12/22 1941 05/13/22  0533   Sodium mmol/L 141  --  137   Potassium mmol/L 3.0*  --  3.6   Chloride mmol/L 103  --  102   CO2 mmol/L 25  --  26   Glucose mg/dL 187*  --  84   Glucose, UA   --  Negative  --    BUN mg/dL 10  --  7   Creatinine mg/dL 0.8  --  0.6   Creatinine, Urine mg/dL  --  71.0  --    Albumin g/dL 3.2*  --  3.0*   Total Bilirubin mg/dL 0.3  --  0.3   Alkaline Phosphatase U/L 105  --  105   AST U/L 19  --  15   ALT U/L 19  --  17   Magnesium mg/dL 1.7  --  1.6   Phosphorus mg/dL  --   --  2.7       Drug levels (last 3 results):  No results for input(s): VANCOMYCINRA, VANCOMYCINPE, VANCOMYCINTR in the last 72 hours.    Microbiologic Results:  Microbiology Results (last 7 days)     Procedure Component Value Units Date/Time     Blood culture #1 **CANNOT BE ORDERED STAT** [[id number]] Collected: 05/12/22 1921    Order Status: Completed Specimen: Blood from Peripheral, Antecubital, Left Updated: 05/14/22 0211     Blood Culture, Routine Gram stain aer bottle: Gram positive cocci in clusters resembling Staph      Positive results previously called  05/14/2022  02:11    Blood culture #2 **CANNOT BE ORDERED STAT** [[id number]] Collected: 05/12/22 1922    Order Status: Completed Specimen: Blood from Peripheral, Antecubital, Left Updated: 05/14/22 0134     Blood Culture, Routine Gram stain aer bottle: Gram positive cocci in clusters resembling Staph      Results called to and read back by:Jose Fallon LPN 05/14/2022  01:34    Culture, Respiratory with Gram Stain [[id number]]     Order Status: No result Specimen: Sputum, Expectorated

## 2022-05-15 LAB
ALBUMIN SERPL BCP-MCNC: 2.5 G/DL (ref 3.5–5.2)
ALP SERPL-CCNC: 91 U/L (ref 55–135)
ALT SERPL W/O P-5'-P-CCNC: 13 U/L (ref 10–44)
ANION GAP SERPL CALC-SCNC: 9 MMOL/L (ref 8–16)
AST SERPL-CCNC: 14 U/L (ref 10–40)
BASOPHILS # BLD AUTO: 0.08 K/UL (ref 0–0.2)
BASOPHILS NFR BLD: 0.9 % (ref 0–1.9)
BILIRUB SERPL-MCNC: 0.3 MG/DL (ref 0.1–1)
BUN SERPL-MCNC: 4 MG/DL (ref 6–20)
CALCIUM SERPL-MCNC: 8.4 MG/DL (ref 8.7–10.5)
CHLORIDE SERPL-SCNC: 108 MMOL/L (ref 95–110)
CO2 SERPL-SCNC: 24 MMOL/L (ref 23–29)
CREAT SERPL-MCNC: 0.5 MG/DL (ref 0.5–1.4)
DIFFERENTIAL METHOD: ABNORMAL
EOSINOPHIL # BLD AUTO: 0.7 K/UL (ref 0–0.5)
EOSINOPHIL NFR BLD: 7.6 % (ref 0–8)
ERYTHROCYTE [DISTWIDTH] IN BLOOD BY AUTOMATED COUNT: 17.4 % (ref 11.5–14.5)
EST. GFR  (AFRICAN AMERICAN): >60 ML/MIN/1.73 M^2
EST. GFR  (NON AFRICAN AMERICAN): >60 ML/MIN/1.73 M^2
GLUCOSE SERPL-MCNC: 91 MG/DL (ref 70–110)
HCT VFR BLD AUTO: 37.8 % (ref 40–54)
HGB BLD-MCNC: 12.1 G/DL (ref 14–18)
IMM GRANULOCYTES # BLD AUTO: 0.14 K/UL (ref 0–0.04)
IMM GRANULOCYTES NFR BLD AUTO: 1.5 % (ref 0–0.5)
LYMPHOCYTES # BLD AUTO: 2.6 K/UL (ref 1–4.8)
LYMPHOCYTES NFR BLD: 28.3 % (ref 18–48)
MAGNESIUM SERPL-MCNC: 1.6 MG/DL (ref 1.6–2.6)
MCH RBC QN AUTO: 22.2 PG (ref 27–31)
MCHC RBC AUTO-ENTMCNC: 32 G/DL (ref 32–36)
MCV RBC AUTO: 69 FL (ref 82–98)
MONOCYTES # BLD AUTO: 1.1 K/UL (ref 0.3–1)
MONOCYTES NFR BLD: 12.1 % (ref 4–15)
NEUTROPHILS # BLD AUTO: 4.5 K/UL (ref 1.8–7.7)
NEUTROPHILS NFR BLD: 49.6 % (ref 38–73)
NRBC BLD-RTO: 0 /100 WBC
PHOSPHATE SERPL-MCNC: 2.4 MG/DL (ref 2.7–4.5)
PLATELET # BLD AUTO: 229 K/UL (ref 150–450)
PMV BLD AUTO: 12.8 FL (ref 9.2–12.9)
POTASSIUM SERPL-SCNC: 4.1 MMOL/L (ref 3.5–5.1)
PROT SERPL-MCNC: 5.8 G/DL (ref 6–8.4)
RBC # BLD AUTO: 5.45 M/UL (ref 4.6–6.2)
SODIUM SERPL-SCNC: 141 MMOL/L (ref 136–145)
VANCOMYCIN TROUGH SERPL-MCNC: 11.8 UG/ML (ref 10–22)
WBC # BLD AUTO: 9.04 K/UL (ref 3.9–12.7)

## 2022-05-15 PROCEDURE — 63600175 PHARM REV CODE 636 W HCPCS

## 2022-05-15 PROCEDURE — 85025 COMPLETE CBC W/AUTO DIFF WBC: CPT

## 2022-05-15 PROCEDURE — 63600175 PHARM REV CODE 636 W HCPCS: Performed by: HOSPITALIST

## 2022-05-15 PROCEDURE — 80202 ASSAY OF VANCOMYCIN: CPT | Performed by: HOSPITALIST

## 2022-05-15 PROCEDURE — 84100 ASSAY OF PHOSPHORUS: CPT

## 2022-05-15 PROCEDURE — 99233 SBSQ HOSP IP/OBS HIGH 50: CPT | Mod: ,,, | Performed by: HOSPITALIST

## 2022-05-15 PROCEDURE — 25000003 PHARM REV CODE 250: Performed by: STUDENT IN AN ORGANIZED HEALTH CARE EDUCATION/TRAINING PROGRAM

## 2022-05-15 PROCEDURE — 20600001 HC STEP DOWN PRIVATE ROOM

## 2022-05-15 PROCEDURE — 25000003 PHARM REV CODE 250

## 2022-05-15 PROCEDURE — 99233 PR SUBSEQUENT HOSPITAL CARE,LEVL III: ICD-10-PCS | Mod: ,,, | Performed by: HOSPITALIST

## 2022-05-15 PROCEDURE — 80053 COMPREHEN METABOLIC PANEL: CPT

## 2022-05-15 PROCEDURE — 83735 ASSAY OF MAGNESIUM: CPT

## 2022-05-15 PROCEDURE — 36415 COLL VENOUS BLD VENIPUNCTURE: CPT | Performed by: HOSPITALIST

## 2022-05-15 PROCEDURE — 25000003 PHARM REV CODE 250: Performed by: HOSPITALIST

## 2022-05-15 RX ADMIN — LEVETIRACETAM 500 MG: 500 TABLET, FILM COATED ORAL at 08:05

## 2022-05-15 RX ADMIN — POTASSIUM CHLORIDE 20 MEQ: 1500 TABLET, EXTENDED RELEASE ORAL at 08:05

## 2022-05-15 RX ADMIN — DOXYCYCLINE HYCLATE 100 MG: 100 TABLET, COATED ORAL at 10:05

## 2022-05-15 RX ADMIN — SODIUM CHLORIDE, SODIUM LACTATE, POTASSIUM CHLORIDE, AND CALCIUM CHLORIDE: .6; .31; .03; .02 INJECTION, SOLUTION INTRAVENOUS at 05:05

## 2022-05-15 RX ADMIN — LEVETIRACETAM 500 MG: 500 TABLET, FILM COATED ORAL at 10:05

## 2022-05-15 RX ADMIN — POTASSIUM CHLORIDE 20 MEQ: 1500 TABLET, EXTENDED RELEASE ORAL at 10:05

## 2022-05-15 RX ADMIN — FAMOTIDINE 20 MG: 20 TABLET ORAL at 08:05

## 2022-05-15 RX ADMIN — DOXYCYCLINE HYCLATE 100 MG: 100 TABLET, COATED ORAL at 08:05

## 2022-05-15 RX ADMIN — VANCOMYCIN HYDROCHLORIDE 1750 MG: 10 INJECTION, POWDER, LYOPHILIZED, FOR SOLUTION INTRAVENOUS at 10:05

## 2022-05-15 RX ADMIN — ENOXAPARIN SODIUM 40 MG: 100 INJECTION SUBCUTANEOUS at 05:05

## 2022-05-15 RX ADMIN — ATORVASTATIN CALCIUM 20 MG: 20 TABLET, FILM COATED ORAL at 10:05

## 2022-05-15 RX ADMIN — TRAVOPROST 1 DROP: 0.04 SOLUTION/ DROPS OPHTHALMIC at 10:05

## 2022-05-15 NOTE — SUBJECTIVE & OBJECTIVE
Interval History: NAEON. Pt reports feeling well this morning, sitting up eating breakfast during interview. Denies any current abdominal pain, N/V/D, or any other acute complaint. Pt now on RA.     Review of Systems   Constitutional:  Positive for fatigue. Negative for chills, diaphoresis and fever.   Respiratory:  Negative for cough and shortness of breath.    Cardiovascular:  Negative for chest pain, palpitations and leg swelling.   Gastrointestinal:  Negative for abdominal pain, diarrhea, nausea and vomiting.   Genitourinary:  Negative for dysuria.   Musculoskeletal:  Negative for back pain and myalgias.   Skin:  Positive for wound. Negative for rash.   Neurological:  Negative for dizziness, syncope and weakness.   Psychiatric/Behavioral:  Negative for agitation and confusion.    Objective:     Vital Signs (Most Recent):  Temp: 97.9 °F (36.6 °C) (05/15/22 0741)  Pulse: 92 (05/15/22 0741)  Resp: 18 (05/15/22 0741)  BP: 134/79 (05/15/22 0741)  SpO2: 95 % (05/15/22 0856) Vital Signs (24h Range):  Temp:  [97.9 °F (36.6 °C)-98.5 °F (36.9 °C)] 97.9 °F (36.6 °C)  Pulse:  [] 92  Resp:  [16-20] 18  SpO2:  [94 %-99 %] 95 %  BP: (120-134)/(64-83) 134/79     Weight: 92.1 kg (203 lb)  Body mass index is 26.78 kg/m².    Intake/Output Summary (Last 24 hours) at 5/15/2022 1012  Last data filed at 5/14/2022 1717  Gross per 24 hour   Intake 1320 ml   Output 1000 ml   Net 320 ml      Physical Exam    Significant Labs: All pertinent labs within the past 24 hours have been reviewed.  Blood Culture:   Recent Labs   Lab 05/14/22 0925 05/14/22  0934   LABBLOO No Growth to date No Growth to date     CBC:   Recent Labs   Lab 05/14/22  0632 05/15/22  0407   WBC 9.12 9.04   HGB 13.2* 12.1*   HCT 43.4 37.8*    229     CMP:   Recent Labs   Lab 05/14/22  0632 05/15/22  0407    141   K 4.4 4.1    108   CO2 28 24   GLU 70 91   BUN 4* 4*   CREATININE 0.6 0.5   CALCIUM 8.5* 8.4*   PROT 5.9* 5.8*   ALBUMIN 2.9* 2.5*    BILITOT 0.4 0.3   ALKPHOS 97 91   AST 15 14   ALT 15 13   ANIONGAP 8 9   EGFRNONAA >60.0 >60.0       Significant Imaging: I have reviewed all pertinent imaging results/findings within the past 24 hours.

## 2022-05-15 NOTE — ASSESSMENT & PLAN NOTE
Lipase 218 on admission  Patient without abdominal or back pain throughout admission. Unclear baseline lipase. Patient s/p IVF for sepsis protocol.

## 2022-05-15 NOTE — ASSESSMENT & PLAN NOTE
WBC 14.8, lactate 3.1, and  on admission. Received fluids and vanc/zosyn in ED. Source could be from pneumonia as there is a left sided consolidation. It is reasonable to consider aspiration pneumonia as patient was unresponsive earlier and reports of emesis although would be unlikely to develop on chest xray this quickly. Other possibility for today's events could be syncopal event.    -- Bcx resulted positive for Staph hominis   -- continue vanc/doxy pending sensitivities  -- TTE unremarkable.

## 2022-05-15 NOTE — PROGRESS NOTES
[facility] - Telemetry [facility] Medicine  Progress Note    Patient Name: Anthony Borges  MRN: [id number]  Patient Class: IP- Inpatient   Admission Date: 5/12/2022  Length of Stay: 3 days  Attending Physician: Dinora Mcpherson*  Primary Care Provider: Bean Pearce MD        Subjective:     Principal Problem:Sepsis        HPI:  Mr. Borges is a 60 yo M PMH COPD, CVA with residual R sided hemiplegia, opioid and alcohol abuse, venous thromboembolism, and ileus who presents from nursing home at [facility] for unresponsiveness. Patient received CPR but had a pulse on arrival. He was breathing 6 times a minute and pupils were normal size. Patient responded to narcan and had respiratory rate 12 requiring 2L NC. In my interview his last memory is yesterday and is unsure why he is in the hospital. Endorses feeling like he needs to cough. Denies fevers, chills, nausea, vomiting, chest pain, or abdominal pain.    In the ED he was given naloxone, zosyn, 1L LR bolus and 500cc LR bolus, vanc, zosyn, and potassium.        Overview/Hospital Course:  Admitted with sepsis possibly 2/2 pna. Bcx resulted positive for GPC resembling Staph. Treated with Unasyn, doxy. TTE unremarkable. Speciation pending. Pt transitioned to vanc/doxy. Continues to improve clinically. On 5/15 Bcx positive for Staph Hominis, awaiting sensitivities.       Interval History: NAEON. Pt reports feeling well this morning, sitting up eating breakfast during interview. Denies any current abdominal pain, N/V/D, or any other acute complaint. Pt now on RA.     Review of Systems   Constitutional:  Positive for fatigue. Negative for chills, diaphoresis and fever.   Respiratory:  Negative for cough and shortness of breath.    Cardiovascular:  Negative for chest pain, palpitations and leg swelling.   Gastrointestinal:  Negative for abdominal pain, diarrhea, nausea and vomiting.   Genitourinary:  Negative for dysuria.   Musculoskeletal:   Negative for back pain and myalgias.   Skin:  Positive for wound. Negative for rash.   Neurological:  Negative for dizziness, syncope and weakness.   Psychiatric/Behavioral:  Negative for agitation and confusion.    Objective:     Vital Signs (Most Recent):  Temp: 97.9 °F (36.6 °C) (05/15/22 0741)  Pulse: 92 (05/15/22 0741)  Resp: 18 (05/15/22 0741)  BP: 134/79 (05/15/22 0741)  SpO2: 95 % (05/15/22 0856) Vital Signs (24h Range):  Temp:  [97.9 °F (36.6 °C)-98.5 °F (36.9 °C)] 97.9 °F (36.6 °C)  Pulse:  [] 92  Resp:  [16-20] 18  SpO2:  [94 %-99 %] 95 %  BP: (120-134)/(64-83) 134/79     Weight: 92.1 kg (203 lb)  Body mass index is 26.78 kg/m².    Intake/Output Summary (Last 24 hours) at 5/15/2022 1012  Last data filed at 5/14/2022 1717  Gross per 24 hour   Intake 1320 ml   Output 1000 ml   Net 320 ml      Physical Exam    Significant Labs: All pertinent labs within the past 24 hours have been reviewed.  Blood Culture:   Recent Labs   Lab 05/14/22  0925 05/14/22  0934   LABBLOO No Growth to date No Growth to date     CBC:   Recent Labs   Lab 05/14/22  0632 05/15/22  0407   WBC 9.12 9.04   HGB 13.2* 12.1*   HCT 43.4 37.8*    229     CMP:   Recent Labs   Lab 05/14/22  0632 05/15/22  0407    141   K 4.4 4.1    108   CO2 28 24   GLU 70 91   BUN 4* 4*   CREATININE 0.6 0.5   CALCIUM 8.5* 8.4*   PROT 5.9* 5.8*   ALBUMIN 2.9* 2.5*   BILITOT 0.4 0.3   ALKPHOS 97 91   AST 15 14   ALT 15 13   ANIONGAP 8 9   EGFRNONAA >60.0 >60.0       Significant Imaging: I have reviewed all pertinent imaging results/findings within the past 24 hours.      Assessment/Plan:      * Sepsis  WBC 14.8, lactate 3.1, and  on admission. Received fluids and vanc/zosyn in ED. Source could be from pneumonia as there is a left sided consolidation. It is reasonable to consider aspiration pneumonia as patient was unresponsive earlier and reports of emesis although would be unlikely to develop on chest xray this quickly. Other  possibility for today's events could be syncopal event.    -- Bcx resulted positive for Staph hominis   -- continue vanc/doxy pending sensitivities  -- TTE unremarkable.     Elevated lipase  Lipase 218 on admission  Patient without abdominal or back pain throughout admission. Unclear baseline lipase. Patient s/p IVF for sepsis protocol.     History of DVT (deep vein thrombosis)  Reported history of DVT with embolism from nursing home    Continue home xeralto 20mg daily      Ileus  Reported ileus from nursing home. Large amount of gas and stool in large bowel on xray    Continue home metoclopramide      History of CVA (cerebrovascular accident)  History of CVA with residual right sided hemiplegia    Continue home keppra 500 PO BID      Hypertension  Takes bumetanide and metoprolol at home  Holding in setting of sepsis      COPD (chronic obstructive pulmonary disease)  Continue home albuterol        VTE Risk Mitigation (From admission, onward)         Ordered     enoxaparin injection 40 mg  Daily         05/14/22 1056     IP VTE HIGH RISK PATIENT  Once         05/12/22 2218     Place sequential compression device  Until discontinued         05/12/22 2218                Discharge Planning   PAT: 5/16/2022     Code Status: Full Code   Is the patient medically ready for discharge?: No    Reason for patient still in hospital (select all that apply): Treatment  Discharge Plan A: Return to nursing home                  Ash Villeda MD   Internal Medicine PGY-1  Department of Hospital Medicine   [facility] - Telemetry Stepdown

## 2022-05-15 NOTE — PROGRESS NOTES
Pharmacokinetic Assessment Follow Up: IV Vancomycin    Vancomycin serum concentration assessment(s):    · Vancomycin trough=11.8 mcg/mL, drawn appropriately     Vancomycin Regimen Plan:    · Goal 15-20 mcg/mL for bacteremia, will increase regimen from 1.5g IV q12h to 1.75g IV q12h.  · Next level tomorrow at 0930.  · Renal function stable    Drug levels (last 3 results):  Recent Labs   Lab Result Units 05/15/22  0831   Vancomycin-Trough ug/mL 11.8       Pharmacy will continue to follow and monitor vancomycin.    Thank you for the consult,   Jeanie Smith, PharmD, [facility]  [id number]     Patient brief summary:  Anthony Borges is a 61 y.o. male initiated on antimicrobial therapy with IV Vancomycin for treatment of bacteremia    Drug Allergies:   Review of patient's allergies indicates:  No Known Allergies    Actual Body Weight:   92kg    Renal Function:   Estimated Creatinine Clearance: 177.6 mL/min (based on SCr of 0.5 mg/dL).,     CBC (last 72 hours):  Recent Labs   Lab Result Units 05/12/22  1921 05/13/22  0533 05/14/22  0632 05/15/22  0407   WBC K/uL 14.84* 11.22 9.12 9.04   Hemoglobin g/dL 13.3* 13.0* 13.2* 12.1*   Hematocrit % 42.6 42.1 43.4 37.8*   Platelets K/uL 326 299 284 229   Gran % % 78.4* 68.9 59.6 49.6   Lymph % % 12.2* 18.8 22.4 28.3   Mono % % 6.5 11.2 11.1 12.1   Eosinophil % % 2.3 0.4 6.3 7.6   Basophil % % 0.3 0.4 0.4 0.9   Differential Method  Automated Automated Automated Automated       Metabolic Panel (last 72 hours):  Recent Labs   Lab Result Units 05/12/22 1921 05/12/22 1941 05/13/22 0533 05/14/22 0632 05/15/22  0407   Sodium mmol/L 141  --  137 141 141   Potassium mmol/L 3.0*  --  3.6 4.4 4.1   Chloride mmol/L 103  --  102 105 108   CO2 mmol/L 25  --  26 28 24   Glucose mg/dL 187*  --  84 70 91   Glucose, UA   --  Negative  --   --   --    BUN mg/dL 10  --  7 4* 4*   Creatinine mg/dL 0.8  --  0.6 0.6 0.5   Creatinine, Urine mg/dL  --  71.0  --   --   --    Albumin g/dL 3.2*  --  3.0* 2.9* 2.5*    Total Bilirubin mg/dL 0.3  --  0.3 0.4 0.3   Alkaline Phosphatase U/L 105  --  105 97 91   AST U/L 19  --  15 15 14   ALT U/L 19  --  17 15 13   Magnesium mg/dL 1.7  --  1.6 1.6 1.6   Phosphorus mg/dL  --   --  2.7 3.1 2.4*       Vancomycin Administrations:  vancomycin given in the last 96 hours                   vancomycin 1.5 g in dextrose 5 % 250 mL IVPB (ready to mix) (mg) 1,500 mg New Bag 05/14/22 2028    vancomycin 1.75 g in 5 % dextrose 500 mL IVPB (mg) 1,750 mg New Bag 05/14/22 0834    vancomycin 1.75 g in 5 % dextrose 500 mL IVPB (mg) 1,750 mg New Bag 05/12/22 2057                Microbiologic Results:  Microbiology Results (last 7 days)     Procedure Component Value Units Date/Time    Blood culture #2 **CANNOT BE ORDERED STAT** [[id number]]  (Abnormal) Collected: 05/12/22 1922    Order Status: Completed Specimen: Blood from Peripheral, Antecubital, Left Updated: 05/15/22 0754     Blood Culture, Routine Gram stain aer bottle: Gram positive cocci in clusters resembling Staph      Results called to and read back by:Jose Fallon LPN 05/14/2022  01:34      Gram stain clinton bottle: Gram positive cocci       Positive results previously called 05/14/2022  12:22      STAPHYLOCOCCUS SPECIES  Identification and susceptibility pending      Blood culture #1 **CANNOT BE ORDERED STAT** [[id number]]  (Abnormal) Collected: 05/12/22 1921    Order Status: Completed Specimen: Blood from Peripheral, Antecubital, Left Updated: 05/15/22 0751     Blood Culture, Routine Gram stain aer bottle: Gram positive cocci in clusters resembling Staph      Positive results previously called  05/14/2022  02:11      STAPHYLOCOCCUS SPECIES  Identification and susceptibility pending      Blood culture [[id number]] Collected: 05/14/22 0925    Order Status: Completed Specimen: Blood from Peripheral, Left Hand Updated: 05/14/22 1915     Blood Culture, Routine No Growth to date    Blood culture [[id number]] Collected: 05/14/22 0934    Order Status:  Completed Specimen: Blood from Peripheral, Left Hand Updated: 05/14/22 1915     Blood Culture, Routine No Growth to date    Culture, Respiratory with Gram Stain [[id number]]     Order Status: No result Specimen: Sputum, Expectorated

## 2022-05-15 NOTE — PLAN OF CARE
Received patient at change of shift. Pt aao2-3. Vss stable. Pt denies pain. No distress. Call bell reach. Pt kept clean dry intact. Q2 hr turns. Rn will continue to monitor.     Problem: Adult Inpatient Plan of Care  Goal: Plan of Care Review  Outcome: Ongoing, Progressing  Goal: Patient-Specific Goal (Individualized)  Outcome: Ongoing, Progressing  Goal: Absence of Hospital-Acquired Illness or Injury  Outcome: Ongoing, Progressing  Goal: Optimal Comfort and Wellbeing  Outcome: Ongoing, Progressing  Goal: Readiness for Transition of Care  Outcome: Ongoing, Progressing     Problem: Adjustment to Illness (Sepsis/Septic Shock)  Goal: Optimal Coping  Outcome: Ongoing, Progressing     Problem: Bleeding (Sepsis/Septic Shock)  Goal: Absence of Bleeding  Outcome: Ongoing, Progressing     Problem: Glycemic Control Impaired (Sepsis/Septic Shock)  Goal: Blood Glucose Level Within Desired Range  Outcome: Ongoing, Progressing     Problem: Infection Progression (Sepsis/Septic Shock)  Goal: Absence of Infection Signs and Symptoms  Outcome: Ongoing, Progressing     Problem: Nutrition Impaired (Sepsis/Septic Shock)  Goal: Optimal Nutrition Intake  Outcome: Ongoing, Progressing     Problem: Skin Injury Risk Increased  Goal: Skin Health and Integrity  Outcome: Ongoing, Progressing     Problem: Impaired Wound Healing  Goal: Optimal Wound Healing  Outcome: Ongoing, Progressing

## 2022-05-16 LAB
ALBUMIN SERPL BCP-MCNC: 2.6 G/DL (ref 3.5–5.2)
ALP SERPL-CCNC: 99 U/L (ref 55–135)
ALT SERPL W/O P-5'-P-CCNC: 12 U/L (ref 10–44)
ANION GAP SERPL CALC-SCNC: 8 MMOL/L (ref 8–16)
AST SERPL-CCNC: 13 U/L (ref 10–40)
BACTERIA BLD CULT: ABNORMAL
BASOPHILS # BLD AUTO: 0.03 K/UL (ref 0–0.2)
BASOPHILS NFR BLD: 0.3 % (ref 0–1.9)
BILIRUB SERPL-MCNC: 0.5 MG/DL (ref 0.1–1)
BUN SERPL-MCNC: 3 MG/DL (ref 6–20)
CALCIUM SERPL-MCNC: 8.5 MG/DL (ref 8.7–10.5)
CHLORIDE SERPL-SCNC: 106 MMOL/L (ref 95–110)
CO2 SERPL-SCNC: 28 MMOL/L (ref 23–29)
CREAT SERPL-MCNC: 0.5 MG/DL (ref 0.5–1.4)
DIFFERENTIAL METHOD: ABNORMAL
EOSINOPHIL # BLD AUTO: 0.4 K/UL (ref 0–0.5)
EOSINOPHIL NFR BLD: 4 % (ref 0–8)
ERYTHROCYTE [DISTWIDTH] IN BLOOD BY AUTOMATED COUNT: 18.1 % (ref 11.5–14.5)
EST. GFR  (AFRICAN AMERICAN): >60 ML/MIN/1.73 M^2
EST. GFR  (NON AFRICAN AMERICAN): >60 ML/MIN/1.73 M^2
GLUCOSE SERPL-MCNC: 86 MG/DL (ref 70–110)
HCT VFR BLD AUTO: 40.7 % (ref 40–54)
HGB BLD-MCNC: 12.9 G/DL (ref 14–18)
IMM GRANULOCYTES # BLD AUTO: 0.02 K/UL (ref 0–0.04)
IMM GRANULOCYTES NFR BLD AUTO: 0.2 % (ref 0–0.5)
LYMPHOCYTES # BLD AUTO: 2 K/UL (ref 1–4.8)
LYMPHOCYTES NFR BLD: 20.4 % (ref 18–48)
MAGNESIUM SERPL-MCNC: 1.4 MG/DL (ref 1.6–2.6)
MCH RBC QN AUTO: 21.9 PG (ref 27–31)
MCHC RBC AUTO-ENTMCNC: 31.7 G/DL (ref 32–36)
MCV RBC AUTO: 69 FL (ref 82–98)
MONOCYTES # BLD AUTO: 1.2 K/UL (ref 0.3–1)
MONOCYTES NFR BLD: 12 % (ref 4–15)
NEUTROPHILS # BLD AUTO: 6.1 K/UL (ref 1.8–7.7)
NEUTROPHILS NFR BLD: 63.1 % (ref 38–73)
NRBC BLD-RTO: 0 /100 WBC
PLATELET # BLD AUTO: 314 K/UL (ref 150–450)
PMV BLD AUTO: 11.7 FL (ref 9.2–12.9)
POTASSIUM SERPL-SCNC: 3.7 MMOL/L (ref 3.5–5.1)
PROT SERPL-MCNC: 6.2 G/DL (ref 6–8.4)
RBC # BLD AUTO: 5.9 M/UL (ref 4.6–6.2)
SODIUM SERPL-SCNC: 142 MMOL/L (ref 136–145)
VANCOMYCIN TROUGH SERPL-MCNC: 19 UG/ML (ref 10–22)
WBC # BLD AUTO: 9.59 K/UL (ref 3.9–12.7)

## 2022-05-16 PROCEDURE — 25000003 PHARM REV CODE 250: Performed by: STUDENT IN AN ORGANIZED HEALTH CARE EDUCATION/TRAINING PROGRAM

## 2022-05-16 PROCEDURE — 25000003 PHARM REV CODE 250

## 2022-05-16 PROCEDURE — 99232 PR SUBSEQUENT HOSPITAL CARE,LEVL II: ICD-10-PCS | Mod: ,,, | Performed by: HOSPITALIST

## 2022-05-16 PROCEDURE — 25000003 PHARM REV CODE 250: Performed by: HOSPITALIST

## 2022-05-16 PROCEDURE — C1751 CATH, INF, PER/CENT/MIDLINE: HCPCS

## 2022-05-16 PROCEDURE — 63600175 PHARM REV CODE 636 W HCPCS

## 2022-05-16 PROCEDURE — 20600001 HC STEP DOWN PRIVATE ROOM

## 2022-05-16 PROCEDURE — 80053 COMPREHEN METABOLIC PANEL: CPT

## 2022-05-16 PROCEDURE — 80202 ASSAY OF VANCOMYCIN: CPT | Performed by: HOSPITALIST

## 2022-05-16 PROCEDURE — 76937 US GUIDE VASCULAR ACCESS: CPT

## 2022-05-16 PROCEDURE — 36410 VNPNXR 3YR/> PHY/QHP DX/THER: CPT

## 2022-05-16 PROCEDURE — 63600175 PHARM REV CODE 636 W HCPCS: Performed by: HOSPITALIST

## 2022-05-16 PROCEDURE — 85025 COMPLETE CBC W/AUTO DIFF WBC: CPT

## 2022-05-16 PROCEDURE — 83735 ASSAY OF MAGNESIUM: CPT

## 2022-05-16 PROCEDURE — 99232 SBSQ HOSP IP/OBS MODERATE 35: CPT | Mod: ,,, | Performed by: HOSPITALIST

## 2022-05-16 PROCEDURE — 36415 COLL VENOUS BLD VENIPUNCTURE: CPT | Performed by: HOSPITALIST

## 2022-05-16 RX ORDER — CEFAZOLIN SODIUM/D5W 2 G/100 ML
2 PLASTIC BAG, INJECTION (ML) INTRAVENOUS
Status: DISCONTINUED | OUTPATIENT
Start: 2022-05-16 | End: 2022-05-17 | Stop reason: HOSPADM

## 2022-05-16 RX ORDER — LANOLIN ALCOHOL/MO/W.PET/CERES
400 CREAM (GRAM) TOPICAL 2 TIMES DAILY
Status: DISCONTINUED | OUTPATIENT
Start: 2022-05-16 | End: 2022-05-17 | Stop reason: HOSPADM

## 2022-05-16 RX ORDER — METOPROLOL TARTRATE 25 MG/1
25 TABLET, FILM COATED ORAL 2 TIMES DAILY
Status: DISCONTINUED | OUTPATIENT
Start: 2022-05-16 | End: 2022-05-17 | Stop reason: HOSPADM

## 2022-05-16 RX ADMIN — DOXYCYCLINE HYCLATE 100 MG: 100 TABLET, COATED ORAL at 09:05

## 2022-05-16 RX ADMIN — LEVETIRACETAM 500 MG: 500 TABLET, FILM COATED ORAL at 09:05

## 2022-05-16 RX ADMIN — ATORVASTATIN CALCIUM 20 MG: 20 TABLET, FILM COATED ORAL at 09:05

## 2022-05-16 RX ADMIN — POTASSIUM CHLORIDE 20 MEQ: 1500 TABLET, EXTENDED RELEASE ORAL at 09:05

## 2022-05-16 RX ADMIN — TRAVOPROST 1 DROP: 0.04 SOLUTION/ DROPS OPHTHALMIC at 09:05

## 2022-05-16 RX ADMIN — Medication 2 G: at 03:05

## 2022-05-16 RX ADMIN — Medication 400 MG: at 03:05

## 2022-05-16 RX ADMIN — ENOXAPARIN SODIUM 40 MG: 100 INJECTION SUBCUTANEOUS at 05:05

## 2022-05-16 RX ADMIN — Medication 2 G: at 10:05

## 2022-05-16 RX ADMIN — VANCOMYCIN HYDROCHLORIDE 1750 MG: 10 INJECTION, POWDER, LYOPHILIZED, FOR SOLUTION INTRAVENOUS at 10:05

## 2022-05-16 RX ADMIN — SODIUM CHLORIDE, SODIUM LACTATE, POTASSIUM CHLORIDE, AND CALCIUM CHLORIDE: .6; .31; .03; .02 INJECTION, SOLUTION INTRAVENOUS at 12:05

## 2022-05-16 RX ADMIN — FAMOTIDINE 20 MG: 20 TABLET ORAL at 09:05

## 2022-05-16 RX ADMIN — METOPROLOL TARTRATE 25 MG: 25 TABLET, FILM COATED ORAL at 09:05

## 2022-05-16 RX ADMIN — Medication 400 MG: at 09:05

## 2022-05-16 NOTE — PLAN OF CARE
Problem: Adult Inpatient Plan of Care  Goal: Plan of Care Review  Outcome: Ongoing, Progressing  Goal: Patient-Specific Goal (Individualized)  Outcome: Ongoing, Progressing  Goal: Absence of Hospital-Acquired Illness or Injury  Outcome: Ongoing, Progressing  Goal: Optimal Comfort and Wellbeing  Outcome: Ongoing, Progressing     Problem: Skin Injury Risk Increased  Goal: Skin Health and Integrity  Outcome: Ongoing, Progressing     Problem: Impaired Wound Healing  Goal: Optimal Wound Healing  Outcome: Ongoing, Progressing

## 2022-05-16 NOTE — CARE UPDATE
"RAPID RESPONSE NURSE CHART REVIEW        Chart Reviewed: 05/15/2022, 9:15 PM    MRN: [id number]  Bed: 8082/8082 A    Dx: Sepsis    Anthony Borges has no past medical history on file.    Last VS: BP (!) 164/87   Pulse 100   Temp 98.7 °F (37.1 °C)   Resp (!) 98   Ht 6' 1" (1.854 m)   Wt 92.1 kg (203 lb)   SpO2 96%   BMI 26.78 kg/m²     24H Vital Sign Range:  Temp:  [97.1 °F (36.2 °C)-98.7 °F (37.1 °C)]   Pulse:  []   Resp:  [18-98]   BP: (124-164)/(73-87)   SpO2:  [95 %-99 %]     Level of Consciousness (AVPU): alert    Recent Labs     05/13/22  0533 05/14/22  0632 05/15/22  0407   WBC 11.22 9.12 9.04   HGB 13.0* 13.2* 12.1*   HCT 42.1 43.4 37.8*    284 229       Recent Labs     05/13/22  0533 05/14/22  0632 05/15/22  0407    141 141   K 3.6 4.4 4.1    105 108   CO2 26 28 24   CREATININE 0.6 0.6 0.5   GLU 84 70 91   PHOS 2.7 3.1 2.4*   MG 1.6 1.6 1.6        Recent Labs     05/12/22  2132   PH 7.331*   PCO2 62.2*   PO2 56   HCO3 32.9*   POCSATURATED 86*   BE 7        OXYGEN:  Flow (L/min): 2     O2 Device (Oxygen Therapy): room air    MEWS score: 2    Bedside RN, Estee contacted. Erroneous VS, RN to correct In chart. No concerns verbalized at this time. Instructed to call [phone] for further concerns or assistance.    Kaur Jacobson RN         "

## 2022-05-16 NOTE — PLAN OF CARE
Problem: Adult Inpatient Plan of Care  Goal: Plan of Care Review  Outcome: Ongoing, Progressing  Goal: Absence of Hospital-Acquired Illness or Injury  Outcome: Ongoing, Progressing     Problem: Infection Progression (Sepsis/Septic Shock)  Goal: Absence of Infection Signs and Symptoms  Outcome: Ongoing, Progressing     Problem: Skin Injury Risk Increased  Goal: Skin Health and Integrity  Outcome: Ongoing, Progressing     Problem: Impaired Wound Healing  Goal: Optimal Wound Healing  Outcome: Ongoing, Progressing     Patient A&O x 3. POC discussed and all questions answered. Patient resting comfortably in bed. Will continue to monitor.

## 2022-05-16 NOTE — ASSESSMENT & PLAN NOTE
Takes bumetanide and metoprolol at home  Held initially in setting of sepsis  - started Lopressor 25 BID on 5/16 as pt with increasing hypertension

## 2022-05-16 NOTE — PLAN OF CARE
05/16/22 1344   Post-Acute Status   Post-Acute Authorization Placement   Post-Acute Placement Status Pending medical clearance/testing   Per MD team, patient could possibly become medically ready for discharge later today. Updated Mandy in admissions at [facility] of possible dc today and patient will need IV abx for 2 weeks. Sent current med list along with clinicals to [facility] in [facility].    Krista Andrews LMSW  [facility]  Ext. [phone]

## 2022-05-16 NOTE — ASSESSMENT & PLAN NOTE
Reported history of DVT with embolism from nursing home. Pt reportedly taking xarelto though not documented in medication list. Bilateral US LE doppler on 5/16 with no evidence of any DVTs.  -- holding off on AC at this time, only on prophylactic lovenox

## 2022-05-16 NOTE — SUBJECTIVE & OBJECTIVE
Interval History: NAEON. Pt feels well today, no acute complaints. Eating well. Satting well on RA. Cultures positive for pan-sensitive Staph Hominis so started on ancef.     Review of Systems   Constitutional:  Positive for fatigue. Negative for chills, diaphoresis and fever.   Respiratory:  Negative for cough and shortness of breath.    Cardiovascular:  Negative for chest pain, palpitations and leg swelling.   Gastrointestinal:  Negative for abdominal pain, diarrhea, nausea and vomiting.   Genitourinary:  Negative for dysuria.   Musculoskeletal:  Negative for back pain and myalgias.   Skin:  Positive for wound. Negative for rash.   Neurological:  Negative for dizziness, syncope and weakness.   Psychiatric/Behavioral:  Negative for agitation and confusion.    Objective:     Vital Signs (Most Recent):  Temp: 99.1 °F (37.3 °C) (05/16/22 1636)  Pulse: 99 (05/16/22 1636)  Resp: (!) 21 (05/16/22 1636)  BP: (!) 162/80 (05/16/22 1636)  SpO2: 100 % (05/16/22 1636)   Vital Signs (24h Range):  Temp:  [97.8 °F (36.6 °C)-99.1 °F (37.3 °C)] 99.1 °F (37.3 °C)  Pulse:  [] 99  Resp:  [18-21] 21  SpO2:  [96 %-100 %] 100 %  BP: (123-170)/(70-87) 162/80     Weight: 92.1 kg (203 lb)  Body mass index is 26.78 kg/m².    Intake/Output Summary (Last 24 hours) at 5/16/2022 1700  Last data filed at 5/16/2022 1200  Gross per 24 hour   Intake 400 ml   Output 4550 ml   Net -4150 ml      Physical Exam  Vitals reviewed.   Constitutional:       Appearance: Normal appearance.      Comments: Pleasant, chronically ill-appearing elderly male lying in bed in [employer]   HENT:      Head: Normocephalic and atraumatic.      Mouth/Throat:      Mouth: Mucous membranes are moist.   Eyes:      Extraocular Movements: Extraocular movements intact.      Pupils: Pupils are equal, round, and reactive to light.   Cardiovascular:      Rate and Rhythm: Normal rate and regular rhythm.      Pulses: Normal pulses.      Heart sounds: Normal heart sounds. No murmur  heard.    No friction rub. No gallop.   Pulmonary:      Effort: Pulmonary effort is normal. No respiratory distress.      Breath sounds: Normal breath sounds.   Abdominal:      General: Abdomen is flat.      Tenderness: There is no abdominal tenderness.   Musculoskeletal:      Cervical back: Normal range of motion and neck supple.      Right lower leg: No edema.      Left lower leg: No edema.   Skin:     General: Skin is warm and dry.   Neurological:      General: No focal deficit present.      Mental Status: He is alert and oriented to person, place, and time.      Comments: Chronic R-sided hemiplegia s/o previous CVA   Psychiatric:         Mood and Affect: Mood normal.         Behavior: Behavior normal.       Significant Labs: All pertinent labs within the past 24 hours have been reviewed.  CBC:   Recent Labs   Lab 05/15/22  0407 05/16/22  0828   WBC 9.04 9.59   HGB 12.1* 12.9*   HCT 37.8* 40.7    314     CMP:   Recent Labs   Lab 05/15/22  0407 05/16/22  0828    142   K 4.1 3.7    106   CO2 24 28   GLU 91 86   BUN 4* 3*   CREATININE 0.5 0.5   CALCIUM 8.4* 8.5*   PROT 5.8* 6.2   ALBUMIN 2.5* 2.6*   BILITOT 0.3 0.5   ALKPHOS 91 99   AST 14 13   ALT 13 12   ANIONGAP 9 8   EGFRNONAA >60.0 >60.0       Significant Imaging: I have reviewed all pertinent imaging results/findings within the past 24 hours.    US Lower Extremity Veins Bilateral   Final Result      No DVT in either lower extremity.      Electronically signed by resident: Jensen Romero   Date:    05/16/2022   Time:    15:09      Electronically signed by: Herminio Justin MD   Date:    05/16/2022   Time:    15:18      X-Ray Chest 1 View   Final Result      No interval detrimental change identified.         Electronically signed by: Balbina Vergara   Date:    05/13/2022   Time:    09:15      CT Head Without Contrast   Final Result      1. No acute intracranial abnormalities noting stable sequela of chronic microvascular ischemic change,  senescent change, and remote infarcts involving the left temporoparietal region and left thalamus.   2. There is stable asymmetry of the lateral ventricles, suggesting ex vacuo dilation.   3. Sinus disease as above.         Electronically signed by: Kishore Machuca MD   Date:    05/12/2022   Time:    20:20      X-Ray Chest 1 View   Final Result      1. Pulmonary findings suggest edema noting there may be developing left consolidation.  Correlation is advised.  Please see above.         Electronically signed by: Kishroe Machuca MD   Date:    05/12/2022   Time:    20:00

## 2022-05-16 NOTE — PROGRESS NOTES
[facility] - Telemetry [facility] Medicine  Progress Note    Patient Name: Anthony Borges  MRN: [id number]  Patient Class: IP- Inpatient   Admission Date: 5/12/2022  Length of Stay: 4 days  Attending Physician: Dinora Mcpherson*  Primary Care Provider: Bean Pearce MD        Subjective:     Principal Problem:Sepsis        HPI:  Mr. Borges is a 60 yo M PMH COPD, CVA with residual R sided hemiplegia, opioid and alcohol abuse, venous thromboembolism, and ileus who presents from nursing home at [facility] for unresponsiveness. Patient received CPR but had a pulse on arrival. He was breathing 6 times a minute and pupils were normal size. Patient responded to narcan and had respiratory rate 12 requiring 2L NC. In my interview his last memory is yesterday and is unsure why he is in the hospital. Endorses feeling like he needs to cough. Denies fevers, chills, nausea, vomiting, chest pain, or abdominal pain.    In the ED he was given naloxone, zosyn, 1L LR bolus and 500cc LR bolus, vanc, zosyn, and potassium.        Overview/Hospital Course:  Admitted with sepsis possibly 2/2 pna. Bcx resulted positive for GPC resembling Staph. Treated with Unasyn, doxy. TTE unremarkable. Speciation pending. Pt transitioned to vanc/doxy. Continues to improve clinically. On 5/15 Bcx positive for Staph Hominis, pan-sensitive. Started on Ancef, discontinued vanc on 5/16. Consult placed for midline. Anticipate discharge back to NH 5/17. With 2 week course if IV abx.       Interval History: NAEON. Pt feels well today, no acute complaints. Eating well. Satting well on RA. Cultures positive for pan-sensitive Staph Hominis so started on ancef.     Review of Systems   Constitutional:  Positive for fatigue. Negative for chills, diaphoresis and fever.   Respiratory:  Negative for cough and shortness of breath.    Cardiovascular:  Negative for chest pain, palpitations and leg swelling.   Gastrointestinal:  Negative for  abdominal pain, diarrhea, nausea and vomiting.   Genitourinary:  Negative for dysuria.   Musculoskeletal:  Negative for back pain and myalgias.   Skin:  Positive for wound. Negative for rash.   Neurological:  Negative for dizziness, syncope and weakness.   Psychiatric/Behavioral:  Negative for agitation and confusion.    Objective:     Vital Signs (Most Recent):  Temp: 99.1 °F (37.3 °C) (05/16/22 1636)  Pulse: 99 (05/16/22 1636)  Resp: (!) 21 (05/16/22 1636)  BP: (!) 162/80 (05/16/22 1636)  SpO2: 100 % (05/16/22 1636)   Vital Signs (24h Range):  Temp:  [97.8 °F (36.6 °C)-99.1 °F (37.3 °C)] 99.1 °F (37.3 °C)  Pulse:  [] 99  Resp:  [18-21] 21  SpO2:  [96 %-100 %] 100 %  BP: (123-170)/(70-87) 162/80     Weight: 92.1 kg (203 lb)  Body mass index is 26.78 kg/m².    Intake/Output Summary (Last 24 hours) at 5/16/2022 1700  Last data filed at 5/16/2022 1200  Gross per 24 hour   Intake 400 ml   Output 4550 ml   Net -4150 ml      Physical Exam  Vitals reviewed.   Constitutional:       Appearance: Normal appearance.      Comments: Pleasant, chronically ill-appearing elderly male lying in bed in [facility]   HENT:      Head: Normocephalic and atraumatic.      Mouth/Throat:      Mouth: Mucous membranes are moist.   Eyes:      Extraocular Movements: Extraocular movements intact.      Pupils: Pupils are equal, round, and reactive to light.   Cardiovascular:      Rate and Rhythm: Normal rate and regular rhythm.      Pulses: Normal pulses.      Heart sounds: Normal heart sounds. No murmur heard.    No friction rub. No gallop.   Pulmonary:      Effort: Pulmonary effort is normal. No respiratory distress.      Breath sounds: Normal breath sounds.   Abdominal:      General: Abdomen is flat.      Tenderness: There is no abdominal tenderness.   Musculoskeletal:      Cervical back: Normal range of motion and neck supple.      Right lower leg: No edema.      Left lower leg: No edema.   Skin:     General: Skin is warm and dry.    Neurological:      General: No focal deficit present.      Mental Status: He is alert and oriented to person, place, and time.      Comments: Chronic R-sided hemiplegia s/o previous CVA   Psychiatric:         Mood and Affect: Mood normal.         Behavior: Behavior normal.       Significant Labs: All pertinent labs within the past 24 hours have been reviewed.  CBC:   Recent Labs   Lab 05/15/22  0407 05/16/22  0828   WBC 9.04 9.59   HGB 12.1* 12.9*   HCT 37.8* 40.7    314     CMP:   Recent Labs   Lab 05/15/22  0407 05/16/22  0828    142   K 4.1 3.7    106   CO2 24 28   GLU 91 86   BUN 4* 3*   CREATININE 0.5 0.5   CALCIUM 8.4* 8.5*   PROT 5.8* 6.2   ALBUMIN 2.5* 2.6*   BILITOT 0.3 0.5   ALKPHOS 91 99   AST 14 13   ALT 13 12   ANIONGAP 9 8   EGFRNONAA >60.0 >60.0       Significant Imaging: I have reviewed all pertinent imaging results/findings within the past 24 hours.    US Lower Extremity Veins Bilateral   Final Result      No DVT in either lower extremity.      Electronically signed by resident: Jensen Romero   Date:    05/16/2022   Time:    15:09      Electronically signed by: Herminio Justin MD   Date:    05/16/2022   Time:    15:18      X-Ray Chest 1 View   Final Result      No interval detrimental change identified.         Electronically signed by: Balbina Vergara   Date:    05/13/2022   Time:    09:15      CT Head Without Contrast   Final Result      1. No acute intracranial abnormalities noting stable sequela of chronic microvascular ischemic change, senescent change, and remote infarcts involving the left temporoparietal region and left thalamus.   2. There is stable asymmetry of the lateral ventricles, suggesting ex vacuo dilation.   3. Sinus disease as above.         Electronically signed by: Kishore Machuca MD   Date:    05/12/2022   Time:    20:20      X-Ray Chest 1 View   Final Result      1. Pulmonary findings suggest edema noting there may be developing left consolidation.   Correlation is advised.  Please see above.         Electronically signed by: Kishore Machuca MD   Date:    05/12/2022   Time:    20:00              Assessment/Plan:      * Sepsis  WBC 14.8, lactate 3.1, and  on admission. Received fluids and vanc/zosyn in ED. Source could be from pneumonia as there is a left sided consolidation. It is reasonable to consider aspiration pneumonia as patient was unresponsive earlier and reports of emesis although would be unlikely to develop on chest xray this quickly. Other possibility for today's events could be syncopal event.    -- Bcx resulted positive for pan-sensitive Staph hominis   -- continued on doxy; vanc discontinued  -- started on ancef; anticipate 2 weeks total course of IV abx  -- TTE unremarkable.     Elevated lipase  Lipase 218 on admission  Patient without abdominal or back pain throughout admission. Unclear baseline lipase. Patient s/p IVF for sepsis protocol.     History of DVT (deep vein thrombosis)  Reported history of DVT with embolism from nursing home. Pt reportedly taking xarelto though not documented in medication list. Bilateral US LE doppler on 5/16 with no evidence of any DVTs.  -- holding off on AC at this time, only on prophylactic lovenox      Ileus  Reported ileus from nursing home. Large amount of gas and stool in large bowel on xray    Continue home metoclopramide      History of CVA (cerebrovascular accident)  History of CVA with residual right sided hemiplegia    Continue home keppra 500 PO BID      Hypertension  Takes bumetanide and metoprolol at home  Held initially in setting of sepsis  - started Lopressor 25 BID on 5/16 as pt with increasing hypertension      COPD (chronic obstructive pulmonary disease)  Continue home albuterol        VTE Risk Mitigation (From admission, onward)         Ordered     enoxaparin injection 40 mg  Daily         05/14/22 1056     IP VTE HIGH RISK PATIENT  Once         05/12/22 2218     Place sequential  compression device  Until discontinued         05/12/22 2218                Discharge Planning   PAT: 5/17/2022     Code Status: Full Code   Is the patient medically ready for discharge?: Yes    Reason for patient still in hospital (select all that apply): Treatment  Discharge Plan A: Return to nursing home                  Ash Villeda MD   Internal Medicine PGY-1  [facility] of [facility] Medicine   [facility] - Telemetry Stepdown

## 2022-05-16 NOTE — ASSESSMENT & PLAN NOTE
WBC 14.8, lactate 3.1, and  on admission. Received fluids and vanc/zosyn in ED. Source could be from pneumonia as there is a left sided consolidation. It is reasonable to consider aspiration pneumonia as patient was unresponsive earlier and reports of emesis although would be unlikely to develop on chest xray this quickly. Other possibility for today's events could be syncopal event.    -- Bcx resulted positive for pan-sensitive Staph hominis   -- continued on doxy; vanc discontinued  -- started on ancef; anticipate 2 weeks total course of IV abx  -- TTE unremarkable.

## 2022-05-16 NOTE — CONSULTS
ANAND placed 18g, 10 cm Midline in left basilic vein using u/s guidance.  Max dwell date 6/14/2022.  Lot # [id number].    MIDLINE inserted for 2 weeks of IV cefazolin

## 2022-05-16 NOTE — PROGRESS NOTES
[facility] - Telemetry Stepdown  Wound Care    Patient Name:  Anthony Borges   MRN:  [id number]  Date: 5/16/2022  Diagnosis: Sepsis    History:     No past medical history on file.    Social History     Socioeconomic History    Marital status: Unknown       Precautions:     Allergies as of 05/12/2022    (No Known Allergies)       [facility] Assessment Details/Treatment     Wound care consulted for coccyx    Altered skin integrity noted to coccyx, pink/partial thickness. Plan: clean area with soap and water, pat dry, and apply Triad cream BID and PRN.    Recommendations made to primary team for above plan via secure chat. Orders placed.     05/16/2022 05/16/22 1045        Altered Skin Integrity 05/12/22 2255 medial Sacral spine #1 Other (comment)   Date First Assessed/Time First Assessed: 05/12/22 2255   Altered Skin Integrity Present on Admission: yes  Orientation: medial  Location: Sacral spine  Wound Number: #1  Primary Wound Type: Other (comment)   Wound Image    Dressing Appearance Dry;Intact   Drainage Amount None   Appearance Pink   Tissue loss description Partial thickness   Periwound Area Intact   Care Cleansed with:;Soap and water;Applied:;Skin Barrier   Dressing Reinforced;Foam

## 2022-05-17 VITALS
SYSTOLIC BLOOD PRESSURE: 120 MMHG | TEMPERATURE: 97 F | DIASTOLIC BLOOD PRESSURE: 67 MMHG | HEART RATE: 102 BPM | OXYGEN SATURATION: 97 % | BODY MASS INDEX: 26.9 KG/M2 | RESPIRATION RATE: 20 BRPM | HEIGHT: 73 IN | WEIGHT: 203 LBS

## 2022-05-17 LAB
BACTERIA BLD CULT: ABNORMAL

## 2022-05-17 PROCEDURE — 99239 HOSP IP/OBS DSCHRG MGMT >30: CPT | Mod: ,,, | Performed by: HOSPITALIST

## 2022-05-17 PROCEDURE — 94761 N-INVAS EAR/PLS OXIMETRY MLT: CPT

## 2022-05-17 PROCEDURE — 25000003 PHARM REV CODE 250: Performed by: HOSPITALIST

## 2022-05-17 PROCEDURE — 25000003 PHARM REV CODE 250

## 2022-05-17 PROCEDURE — 63600175 PHARM REV CODE 636 W HCPCS

## 2022-05-17 PROCEDURE — 25000003 PHARM REV CODE 250: Performed by: STUDENT IN AN ORGANIZED HEALTH CARE EDUCATION/TRAINING PROGRAM

## 2022-05-17 PROCEDURE — 99239 PR HOSPITAL DISCHARGE DAY,>30 MIN: ICD-10-PCS | Mod: ,,, | Performed by: HOSPITALIST

## 2022-05-17 RX ORDER — CEFAZOLIN SODIUM/D5W 2 G/100 ML
2 PLASTIC BAG, INJECTION (ML) INTRAVENOUS EVERY 8 HOURS
Qty: 4200 ML | Refills: 0 | Status: SHIPPED | OUTPATIENT
Start: 2022-05-17 | End: 2022-05-31

## 2022-05-17 RX ADMIN — POTASSIUM CHLORIDE 20 MEQ: 1500 TABLET, EXTENDED RELEASE ORAL at 09:05

## 2022-05-17 RX ADMIN — LEVETIRACETAM 500 MG: 500 TABLET, FILM COATED ORAL at 09:05

## 2022-05-17 RX ADMIN — Medication 2 G: at 05:05

## 2022-05-17 RX ADMIN — FAMOTIDINE 20 MG: 20 TABLET ORAL at 09:05

## 2022-05-17 RX ADMIN — Medication 400 MG: at 09:05

## 2022-05-17 RX ADMIN — SODIUM CHLORIDE, SODIUM LACTATE, POTASSIUM CHLORIDE, AND CALCIUM CHLORIDE: .6; .31; .03; .02 INJECTION, SOLUTION INTRAVENOUS at 01:05

## 2022-05-17 RX ADMIN — METOPROLOL TARTRATE 25 MG: 25 TABLET, FILM COATED ORAL at 09:05

## 2022-05-17 RX ADMIN — DOXYCYCLINE HYCLATE 100 MG: 100 TABLET, COATED ORAL at 09:05

## 2022-05-17 NOTE — NURSING
2000  Received report for pt from AM Nurse. Pt resting in bed. AAOx2; RA. VSS. No c/o pain. Tele box in place. Education regarding fall and safety precaution provided. Safety check performed. Call bell within reach. Will continue to monitor.    0000 Pt resting in bed. Medication administered per MAR. Safety check performed. Call bell within reach. Will continue to monitor.

## 2022-05-17 NOTE — PLAN OF CARE
[facility] - Telemetry Stepdown  Discharge Final Note    Primary Care Provider: Bean Pearce MD    Expected Discharge Date: 5/17/2022    Final Discharge Note (most recent)     Final Note - 05/17/22 0852        Final Note    Assessment Type Final Discharge Note     Anticipated Discharge Disposition care home Nursing Home        Post-Acute Status    Post-Acute Authorization Placement     Post-Acute Placement Status Set-up Complete/Auth obtained   [facility]- resident    Discharge Delays None known at this time             Per Mandy at [facility], nurse can call report to [phone] and patient will transfer to room 312B. Nurse notified.    SW arranged stretcher transport via [facility]. Requested  time is 11:45AM. Requested  time does not guarantee arrival time.    Notified patient's daughter, Kelly Borges [phone], of patient's discharge today, returning to [facility].    Krista Andrews LMSW  [facility]  Ext. [phone]

## 2022-05-17 NOTE — PLAN OF CARE
Attempted to contact Mandy in admissions and left voicemail notifying that [facility] nurse has not answered the phone to take report from [facility] nurse; provided OM nurse's phone number for return call to receive report.    Krista Andrews, LMSW [facility]  Ext. [phone]

## 2022-05-17 NOTE — DISCHARGE SUMMARY
[facility] - Telemetry [facility] Medicine  Discharge Summary      Patient Name: Anthony Borges  MRN: [id number]  Patient Class: IP- Inpatient  Admission Date: 5/12/2022  Hospital Length of Stay: 5 days  Discharge Date and Time:  05/17/2022 11:12 AM  Attending Physician: Dinora Mcpherson*   Discharging Provider: Ash Villeda MD  Primary Care Provider: Bean Pearce MD  Hospital Medicine Team: [facility] 3 Ash Villeda MD    HPI:   Mr. Borges is a 62 yo M PMH COPD, CVA with residual R sided hemiplegia, opioid and alcohol abuse, venous thromboembolism, and ileus who presents from nursing home at [facility] for unresponsiveness. Patient received CPR but had a pulse on arrival. He was breathing 6 times a minute and pupils were normal size. Patient responded to narcan and had respiratory rate 12 requiring 2L NC. In my interview his last memory is yesterday and is unsure why he is in the hospital. Endorses feeling like he needs to cough. Denies fevers, chills, nausea, vomiting, chest pain, or abdominal pain.    In the ED he was given naloxone, zosyn, 1L LR bolus and 500cc LR bolus, vanc, zosyn, and potassium.        * No surgery found *      Hospital Course:   Admitted with sepsis possibly 2/2 pna. Bcx resulted positive for GPC resembling Staph. Treated with Unasyn, doxy. TTE unremarkable. Speciation pending. Pt transitioned to vanc/doxy. Continues to improve clinically. On 5/15 Bcx positive for Staph Hominis, pan-sensitive. Started on Ancef, discontinued vanc on 5/16. Consult placed for midline. On 5/17 patient stable for discharge back to NH with 2 week course if IV abx.      Wt Readings from Last 3 Encounters:   05/13/22 92.1 kg (203 lb)   08/06/21 97.5 kg (215 lb)     Temp Readings from Last 3 Encounters:   05/17/22 97.4 °F (36.3 °C) (Oral)   08/06/21 97.8 °F (36.6 °C) (Oral)   11/14/14 97.3 °F (36.3 °C)     BP Readings from Last 3 Encounters:   05/17/22 130/71   08/06/21 122/76  "  11/14/14 110/70     Pulse Readings from Last 3 Encounters:   05/17/22 88   08/06/21 85   11/14/14 91       Physical Exam  Vitals reviewed.   Constitutional:       Appearance: Normal appearance.      Comments: Pleasant, chronically ill-appearing elderly male lying in bed in NAD   HENT:      Head: Normocephalic and atraumatic.      Mouth/Throat:      Mouth: Mucous membranes are moist.   Eyes:      Extraocular Movements: Extraocular movements intact.      Pupils: Pupils are equal, round, and reactive to light.   Cardiovascular:      Rate and Rhythm: Normal rate and regular rhythm.      Pulses: Normal pulses.      Heart sounds: Normal heart sounds. No murmur heard.    No friction rub. No gallop.   Pulmonary:      Effort: Pulmonary effort is normal. No respiratory distress.      Breath sounds: Normal breath sounds.   Abdominal:      General: Abdomen is flat.      Tenderness: There is no abdominal tenderness.   Musculoskeletal:      Cervical back: Normal range of motion and neck supple.      Right lower leg: No edema.      Left lower leg: No edema.   Skin:     General: Skin is warm and dry.   Neurological:      General: No focal deficit present.      Mental Status: He is alert and oriented to person, place, and time.      Comments: Chronic R-sided hemiplegia s/o previous CVA   Psychiatric:         Mood and Affect: Mood normal.         Behavior: Behavior normal.     Goals of Care Treatment Preferences:  Code Status: Full Code    Living Will: Yes              Consults:   Consults (From admission, onward)        Status Ordering Provider     [facility]  [facility]        Provider:  (Not yet assigned)    Completed DAVIDSON MCDONOUGH     Pharmacy to dose Vancomycin consult  Once        Provider:  (Not yet assigned)   "And" Linked Group Details    Completed AMRIT GALLO new Assessment & Plan notes have been filed under this hospital service since the last note was generated.  Service: " Hospital Medicine    Final Active Diagnoses:    Diagnosis Date Noted POA    PRINCIPAL PROBLEM:  Sepsis [A41.9] 05/12/2022 Unknown    History of DVT (deep vein thrombosis) [Z86.718] 05/13/2022 Not Applicable    Elevated lipase [R74.8] 05/13/2022 Unknown    COPD (chronic obstructive pulmonary disease) [J44.9] 05/12/2022 Unknown    Hypertension [I10] 05/12/2022 Unknown    History of CVA (cerebrovascular accident) [Z86.73] 05/12/2022 Not Applicable    Ileus [K56.7] 05/12/2022 Unknown      Problems Resolved During this Admission:    Diagnosis Date Noted Date Resolved POA    Acute hypercapnic respiratory failure [J96.02] 05/12/2022 05/12/2022 Unknown       Discharged Condition: stable    Disposition: intermediate Nursing Home    Follow Up:    Patient Instructions:   No discharge procedures on file.    US Lower Extremity Veins Bilateral   Final Result      No DVT in either lower extremity.      Electronically signed by resident: Jensen Romero   Date:    05/16/2022   Time:    15:09      Electronically signed by: Herminio Justin MD   Date:    05/16/2022   Time:    15:18      X-Ray Chest 1 View   Final Result      No interval detrimental change identified.         Electronically signed by: Balbina Vergara   Date:    05/13/2022   Time:    09:15      CT Head Without Contrast   Final Result      1. No acute intracranial abnormalities noting stable sequela of chronic microvascular ischemic change, senescent change, and remote infarcts involving the left temporoparietal region and left thalamus.   2. There is stable asymmetry of the lateral ventricles, suggesting ex vacuo dilation.   3. Sinus disease as above.         Electronically signed by: Kishore Machuca MD   Date:    05/12/2022   Time:    20:20      X-Ray Chest 1 View   Final Result      1. Pulmonary findings suggest edema noting there may be developing left consolidation.  Correlation is advised.  Please see above.         Electronically signed by: Kishore Machuca MD    Date:    05/12/2022   Time:    20:00            Pending Diagnostic Studies:     None         Medications:  Reconciled Home Medications:      Medication List      START taking these medications    ceFAZolin in dextrose 5 % 2 gram/100 mL  Inject 100 mLs (2 g total) into the vein every 8 (eight) hours. for 14 days        CONTINUE taking these medications    acetaminophen 325 MG tablet  Commonly known as: TYLENOL  Take 325 mg by mouth every 4 (four) hours as needed (headache/ fever).     aluminum-magnesium hydroxide-simethicone 200-200-20 mg/5 mL Susp  Commonly known as: MAALOX  Take 30 mLs by mouth every 4 (four) hours as needed (dyspepsia).     atorvastatin 20 MG tablet  Commonly known as: LIPITOR  Take 20 mg by mouth every evening.     bisacodyL 10 mg Supp  Commonly known as: DULCOLAX  Place 10 mg rectally daily as needed (constipation).     brimonidine 0.2% 0.2 % Drop  Commonly known as: ALPHAGAN  Place 1 drop into both eyes 2 (two) times a day.     bumetanide 2 MG tablet  Commonly known as: BUMEX  Take 2 mg by mouth once daily.     DIPHENHYDRAMINE-PHENYLEPHRINE ORAL  Take 10 mLs by mouth 3 (three) times daily as needed (cough).     ergocalciferol 50,000 unit Cap  Commonly known as: ERGOCALCIFEROL  Take 50,000 Units by mouth every Thursday.     famotidine 20 MG tablet  Commonly known as: PEPCID  Take 20 mg by mouth once daily.     guaiFENesin 600 mg 12 hr tablet  Commonly known as: MUCINEX  Take 600 mg by mouth 2 (two) times daily.     levETIRAcetam 500 MG Tab  Commonly known as: KEPPRA  Take 500 mg by mouth 2 (two) times daily.     metoclopramide HCl 5 mg/5 mL Soln  Commonly known as: REGLAN  Take 5 mg by mouth every 8 (eight) hours as needed (ileus issues).     metoprolol tartrate 25 MG tablet  Commonly known as: LOPRESSOR  Take 25 mg by mouth 2 (two) times daily.     ONE DAILY MULTIVITAMIN per tablet  Generic drug: multivitamin  Take 1 tablet by mouth once daily.     potassium chloride SA 20 MEQ  tablet  Commonly known as: K-DUR,KLOR-CON  Take 20 mEq by mouth 2 (two) times daily.     PROAIR HFA 90 mcg/actuation inhaler  Generic drug: albuterol  Inhale 2 puffs into the lungs every 4 (four) hours as needed for Wheezing. Rescue     senna 8.6 mg tablet  Commonly known as: SENOKOT  Take 2 tablets by mouth daily as needed for Constipation.     travoprost (benzalkonium) 0.004 % ophthalmic solution  Commonly known as: TRAVATAN  Place 1 drop into both eyes every evening.     VITAMIN C 500 MG tablet  Generic drug: ascorbic acid (vitamin C)  Take 500 mg by mouth once daily.            Indwelling Lines/Drains at time of discharge:   Lines/Drains/Airways     Drain  Duration           Male External Urinary Catheter 05/12/22 2350 Large 4 days                Time spent on the discharge of patient: 45 minutes         Ash Villeda MD   Internal Medicine PGY-1  Department of Hospital Medicine  [facility] - Telemetry Stepdown

## 2022-05-17 NOTE — PLAN OF CARE
NURSING HOME ORDERS    05/17/2022  [facility]  [facility]ULA [facility]DA[facility] - TELEMETRY STEPDOWN  [address]  [facility] [address]  Dept: [phone]  Loc: [phone]     Admit to Nursing Home:      Diagnoses:  Active Hospital Problems    Diagnosis  POA    *Sepsis [A41.9]  Unknown    History of DVT (deep vein thrombosis) [Z86.718]  Not Applicable    Elevated lipase [R74.8]  Unknown    COPD (chronic obstructive pulmonary disease) [J44.9]  Unknown    Hypertension [I10]  Unknown    History of CVA (cerebrovascular accident) [Z86.73]  Not Applicable    Ileus [K56.7]  Unknown      Resolved Hospital Problems    Diagnosis Date Resolved POA    Acute hypercapnic respiratory failure [J96.02] 05/12/2022 Unknown       Code Status: Full    Patient is homebound due to:  Sepsis    Allergies:Review of patient's allergies indicates:  No Known Allergies    Vitals:  Routine    Diet: regular diet    Activities:   Activity as tolerated    Nursing Precautions:  Fall and Pressure ulcer prevention      Miscellaneous   Home Infusion Therapy:   SN to perform Infusion Therapy/Central Line Care.  Review Central Line Care & Central Line Flush with patient.    Administer (drug and dose): Cefazolin 2g IV every 8 hours; end date 5/26/2022  Last dose given: 5/17/2022 1400      Home dose due: 5/17/2022 2200    Scrub the Hub: Prior to accessing the line, always perform a 30 second alcohol scrub  Each lumen of the central line is to be flushed at least daily with 10 mL Normal Saline and 3 mL Heparin flush (10 units/mL)  Skilled Nurse (SN) may draw blood from IV access  Blood Draw Procedure:   - Aspirate at least 5 mL of blood   - Discard   - Obtain specimen   - Change injection cap   - Flush with 20 mL Normal Saline followed by a                 3-5 mL Heparin flush (10 units/mL)  Central :   - Sterile dressing changes are done weekly and as needed.   - Use chlor-hexadine scrub to cleanse site, apply  Biopatch to insertion site,       apply securement device dressing   - Injection caps are changed weekly and after EVERY lab draw.   - If sterile gauze is under dressing to control oozing,                 dressing change must be performed every 24 hours until gauze is not needed.              Medications: Discontinue all previous medication orders, if any. See new list below.     Medication List      START taking these medications    ceFAZolin in dextrose 5 % 2 gram/100 mL  Inject 100 mLs (2 g total) into the vein every 8 (eight) hours. for 14 days        CONTINUE taking these medications    acetaminophen 325 MG tablet  Commonly known as: TYLENOL  Take 325 mg by mouth every 4 (four) hours as needed (headache/ fever).     aluminum-magnesium hydroxide-simethicone 200-200-20 mg/5 mL Susp  Commonly known as: MAALOX  Take 30 mLs by mouth every 4 (four) hours as needed (dyspepsia).     atorvastatin 20 MG tablet  Commonly known as: LIPITOR  Take 20 mg by mouth every evening.     bisacodyL 10 mg Supp  Commonly known as: DULCOLAX  Place 10 mg rectally daily as needed (constipation).     brimonidine 0.2% 0.2 % Drop  Commonly known as: ALPHAGAN  Place 1 drop into both eyes 2 (two) times a day.     bumetanide 2 MG tablet  Commonly known as: BUMEX  Take 2 mg by mouth once daily.     DIPHENHYDRAMINE-PHENYLEPHRINE ORAL  Take 10 mLs by mouth 3 (three) times daily as needed (cough).     ergocalciferol 50,000 unit Cap  Commonly known as: ERGOCALCIFEROL  Take 50,000 Units by mouth every Thursday.     famotidine 20 MG tablet  Commonly known as: PEPCID  Take 20 mg by mouth once daily.     guaiFENesin 600 mg 12 hr tablet  Commonly known as: MUCINEX  Take 600 mg by mouth 2 (two) times daily.     levETIRAcetam 500 MG Tab  Commonly known as: KEPPRA  Take 500 mg by mouth 2 (two) times daily.     metoclopramide HCl 5 mg/5 mL Soln  Commonly known as: REGLAN  Take 5 mg by mouth every 8 (eight) hours as needed (ileus issues).     metoprolol  tartrate 25 MG tablet  Commonly known as: LOPRESSOR  Take 25 mg by mouth 2 (two) times daily.     ONE DAILY MULTIVITAMIN per tablet  Generic drug: multivitamin  Take 1 tablet by mouth once daily.     potassium chloride SA 20 MEQ tablet  Commonly known as: K-DUR,KLOR-CON  Take 20 mEq by mouth 2 (two) times daily.     PROAIR HFA 90 mcg/actuation inhaler  Generic drug: albuterol  Inhale 2 puffs into the lungs every 4 (four) hours as needed for Wheezing. Rescue     senna 8.6 mg tablet  Commonly known as: SENOKOT  Take 2 tablets by mouth daily as needed for Constipation.     travoprost (benzalkonium) 0.004 % ophthalmic solution  Commonly known as: TRAVATAN  Place 1 drop into both eyes every evening.     VITAMIN C 500 MG tablet  Generic drug: ascorbic acid (vitamin C)  Take 500 mg by mouth once daily.              Immunizations Administered as of 5/17/2022     No immunizations on file.            Some patients may experience side effects after vaccination.  These may include fever, headache, muscle or joint aches.  Most symptoms resolve with 24-48 hours and do not require urgent medical evaluation unless they persist for more than 72 hours or symptoms are concerning for an unrelated medical condition.          _________________________________  Ash Villeda MD  05/17/2022

## 2022-05-17 NOTE — NURSING
Attempt to call report to report to [facility] x2. Nurse for  [address] never pick-up. [facility] ambulance EMT's on unit for pick-up. Remove IV X2. Telemetry monitor.

## 2022-05-19 LAB
BACTERIA BLD CULT: NORMAL
BACTERIA BLD CULT: NORMAL

## 2022-05-20 NOTE — PHYSICIAN QUERY
PT Name: Anthony Borges  MR #: [id number]    DOCUMENTATION CLARIFICATION     CDS: oJhn Street RN CCDS               Contact information: [email or web address]   This form is a permanent document in the medical record.     Query Date: May 20, 2022    By submitting this query, we are merely seeking further clarification of documentation. Please utilize your independent clinical judgment when addressing the question(s) below.    The Medical Record contains the following:   Indicators   Supporting Clinical Findings Location in Medical Record     x AMS, Confusion,  LOC, etc.  AMS, presenting to the ED with unresponsiveness. GCS three on arrival. 5/12/2022 ED provider notes     x Acute/Chronic Illness Opioid overdose, undetermined intent, initial encounter  Pneumonia  Sepsis  Ileus  opioid and alcohol abuse 5/12/2022 ED provider notes    5/13/2022 H&P     x Radiology Findings 1. No acute intracranial abnormalities noting stable sequela of chronic microvascular ischemic change, senescent change, and remote infarcts involving the left temporoparietal region and left thalamus.   2. There is stable asymmetry of the lateral ventricles, suggesting ex vacuo dilation.   3. Sinus disease as above. 5/12/2022 CT head     x Electrolyte Imbalance Hypokalemia     05/12/22 19:21   Potassium 3.0 (L)      05/12/22 19:21   Lactate, Kade 3.1 (H)       05/12/22 19:25 05/12/22 21:32   POC PH 7.288 (L) 7.331 (L)   POC PCO2 61.2 (H) 62.2 (H)   POC PO2 64 (HH) 56   POC BE 3 7   POC HCO3 29.3 (H) 32.9 (H)   POC SATURATED O2 89 (L) 86 (L)   POC TCO2 31 (H) 35 (H)   POC TCO2 (MEASURED)     Sample VENOUS VENOUS    5/12/2022 ED provider notes    Lab values     x Medication naloxone 2 mg IV x 1  potassium chloride 10 mEq IVPB x 1  sodium chloride 0.9% bolus 1,000 mL  sodium chloride 0.9% bolus 500 mL  potassium chloride SA CR tablet 20 mEq PO BID  magnesium oxide 400 mg PO BID  piperacillin-tazobactam 4.5 g IVPB x 1  azithromycin 500 mg PO x  1  vancomycin 1.75 g IVPB x 1  vancomycin 1.75 g IVPB x 1  ampicillin-sulbactam 1.5 g IVPB q6h  vancomycin 1.5 g IVPB q12h  doxycycline 100 mg PO q12h  vancomycin 1.75 g IVPB q12h  ceFAZolin 2 gram IVPB q8h 5/12/2022 Medications        5/13-5/17/2022 Medication  5/16-5/17/2022 Medication  5/12/2022 Medications      5/14/2022 Medication  5/13-5/14/2022 Medication  5/14-5/15/2022 Medication  5/14-5/17/2022 Medication  5/15-5/16/2022 Medication  5/16-5/17/2022 Medication    Treatment               x Other Narcan given and pt more responsive  Was talking, answering questions afterwards.      Reportedly had response to narcan with large bout of emesis although drug screen negative   Mentation back to baseline by time of my assessment 5/12/2022 ED nursing notes  5/12/2022 ED provider notes    5/13/2022 H&P     The noted clinical guidelines are only system guidelines and do not replace the providers clinical judgment.    The National [place] of Neurologic Disorders and Stroke (NINDS) of the NIH describes encephalopathy as any diffuse disease of the brain that alters brain function or structure.    Provider, please specify the diagnosis or diagnoses associated with above clinical findings.    [ x  ] Metabolic Encephalopathy - Due to electrolyte imbalance, metabolic derangements, or infectious processes, includes Septic Encephalopathy, Uremic Encephalopathy   [   ] Toxic Encephalopathy - Due to drugs, chemicals, or other toxic substances   [   ] Other Encephalopathy (please specify): ____________________   [   ] Other neurological condition- Includes Post-ictal altered mental status (please specify condition): __________   [   ]  Clinically Undetermined           Please document in your progress notes daily for the duration of treatment until resolved, and include in your discharge summary.    References:  ELVIE Boss RN, CCDS. (2018, June 9). Notes from the Instructor: Encephalopathy tips. Retrieved October 22, 2020,  from [email or web address].[email or web address]/articles/[email or web address]-instructor-encephalopathy-tips    ICD-9-CM Coding Clinic First Quarter 2013, Effective with discharges: October 21, 2013 [facility] § Seizure with encephalopathy due to postictal state (2013).    ICD-10-CM/PCS  Integrated Codebook (Version V.20.8.10.0) [Computer software]. (2020). Retrieved October 21, 2020.    National [place] of Neurological Disorders and Stroke. (2019, March 27). Retrieved October 22, 2020, from https://www.ninds.nih.gov/Disorders/All-Disorders/Dlilhjsadjsuls-Oqaxadwplqg-Ssgf    Form No. 61132

## 2022-05-20 NOTE — PHYSICIAN QUERY
PT Name: Anthony Borges  MR #: [id number]     DOCUMENTATION CLARIFICATION     CDS: John Street RN CCDS               Contact information: [email or web address]   This form is a permanent document in the medical record.     Query Date: May 20, 2022    By submitting this query, we are merely seeking further clarification of documentation.  Please utilize your independent clinical judgment when addressing the question(s) below.  The Medical Record contains the following   Indicators   Supporting Clinical Findings Location in Medical Record     x SOB, CROCKETT, Wheezing, Productive Cough, Use of Accessory Muscles, etc. AMS, presenting to the ED with unresponsiveness. GCS three on arrival.  Wheezing, rhonchi and rales.  5/12/2022 ED provider notes    5/13/2022 H&P     x RR         ABGs         O2 sat O2 sats 96-99% on 2L O2, RR 6-18 5/12/2022 Vitals      Hypoxia/Hypercapnia        BiPAP/Intubation/Mechanical Ventilation       x Supplemental O2  on 2L O2 5/12/2022 Vitals    Home O2, Oxygen Dependence       x Respiratory distress or failure Acute hypercapnic respiratory failure 5/17/2022 Hospital Medicine plan of care     x Radiology findings Pulmonary findings suggest edema noting there may be developing left consolidation.  Correlation is advised.  Please see above.     No interval detrimental change identified. 5/12/2022 Chest x-ray           5/13/2022 Chest x-ray     x Acute/Chronic Illness Pneumonia  Opioid overdose, undetermined intent, initial encounter  opioid and alcohol abuse  Sepsis  Ileus 5/12/2022 ED provider notes  5/13/2022 H&P    Treatment       x Other  05/12/22 19:25 05/12/22 21:32   POC PH 7.288 (L) 7.331 (L)   POC PCO2 61.2 (H) 62.2 (H)   POC PO2 64 (HH) 56   POC BE 3 7   POC HCO3 29.3 (H) 32.9 (H)   POC SATURATED O2 89 (L) 86 (L)   POC TCO2 31 (H) 35 (H)   POC TCO2 (MEASURED)     Sample VENOUS VENOUS    VBGs       The noted clinical guidelines are only system guidelines and do not replace the providers  clinical judgment.    Provider, please specify the diagnosis or diagnoses associated with above clinical findings.     [ x   ] Acute Respiratory Failure with Hypercapnia - pCO2 > 50 mmHg with pH < 7.35 and respiratory symptoms documented   [    ] Hypercapnia Only   [    ] Other Respiratory Diagnosis (please specify): _________________   [   ] Clinically Undetermined         Please document in your progress notes daily for the duration of treatment until resolved and include in your discharge summary.     Reference:    ADWOA Parnell MD. (2020, March 13). Acute respiratory distress syndrome: Clinical features, diagnosis, and complications in adults ([id number] RAMIN Serrano MD & [id number] ARLENE Johnson MD, Eds.). Retrieved November 13, 2020, from https://www.Hazinem.com.com/contents/acute-respiratory-distress-syndrome-clinical-features-diagnosis-and-complications-in-adults?search=ards&source=search_result&selectedTitle=1~150&usage_type=default&display_rank=1  Form No. 81230

## 2022-05-20 NOTE — PHYSICIAN QUERY
PT Name: Anthony Borges  MR #: [id number]     DOCUMENTATION CLARIFICATION      CDS: John Street RN CCDS               Contact information: [email or web address]   This form is a permanent document in the medical record.    Query Date: May 20, 2022    By submitting this query, we are merely seeking further clarification of documentation to reflect the severity of illness of your patient. Please utilize your independent clinical judgment when addressing the question(s) below.     The Medical Record contains the following:   Indicators   Supporting Clinical Findings Location in Medical Record     x Documentation of Sepsis, Septic Shock Admitted with sepsis possibly 2/2 pna.  5/17/2022 DC summary     x Blood Culture Blood cultures x 2 from Left AC positive for STAPHYLOCOCCUS HOMINIS SUBSP. HOMINIS     Susceptibility     Staphylococcus hominis subsp. hominis    CULTURE, BLOOD    Clindamycin <=0.5 mcg/mL Sensitive    Erythromycin <=0.5 mcg/mL Sensitive    Oxacillin <=0.25 mcg/mL Sensitive    Penicillin <=0.03 mcg/mL Sensitive    Tetracycline <=4 mcg/mL Sensitive    Trimeth/Sulfa <=0.5/9.5 m... Sensitive 5/12/2022 Microbiology reports    Respiratory Culture      Urine Culture      Other Culture       x Acute/Chronic Illness Pneumonia  Opioid overdose, undetermined intent, initial encounter  treatment for CAP/aspiration pneumonia   2 weeks of IV ancef for staph hominis bacteremia (presumed source is skin)  5/12/2022 ED provider notes    5/14/2022 Hospital Medicine progress notes  5/17/2022 DC summary     x Medication/Treatment piperacillin-tazobactam 4.5 g IVPB x 1  azithromycin 500 mg PO x 1  vancomycin 1.75 g IVPB x 1  vancomycin 1.75 g IVPB x 1  ampicillin-sulbactam 1.5 g IVPB q6h  vancomycin 1.5 g IVPB q12h  doxycycline 100 mg PO q12h  vancomycin 1.75 g IVPB q12h  ceFAZolin 2 gram IVPB q8h  On 5/15 Bcx positive for Staph Hominis, pan-sensitive. Started on Ancef, discontinued vanc on 5/16.Consult placed for midline.  On 5/17 patient stable for discharge back to NH with 2 week course if IV abx. 5/12/2022 Medications      5/14/2022 Medication  5/13-5/14/2022 Medication  5/14-5/15/2022 Medication  5/14-5/17/2022 Medication  5/15-5/16/2022 Medication  5/16-5/17/2022 Medication  5/17/2022 DC summary     x Other Diminished left-sided lung sounds  Patient given 2 mg Narcan, had good response, though had significant amount of vomiting subsequently.    Had aspiration event or syncopal event related to tachyarrhythmia. Plan for continued IV fluid resuscitation and treatment for CAP/aspiration pneumonia with unasyn/azithromycin. Wheezing, rhonchi and rales. It is reasonable to consider aspiration pneumonia as patient was unresponsive earlier and reports of emesis although would be unlikely to develop on chest xray this quickly.    T 97.6-98.6, HR , RR 6-18, -148/DBP 72-89  O2 sats 96-99% on 2L O2    Pulmonary findings suggest edema noting there may be developing left consolidation.  Correlation is advised.  Please see above.    No interval detrimental change identified. 5/12/2022 ED provider notes        5/13/2022 H&P                5/12/2022 Vitals      5/12/2022 Chest x-ray        5/13/2022 Chest x-ray      Provider, please further specify the Sepis diagnosis:     [   ] Due to Aspiration Pneumonia   [   ] Due to Staph hominis bacteremia    [  x ] Due to Unspecified Pneumonia   [   ] Due to Aspiration Pneumonia and Staph hominis bacteremia    [   ] Due to Unspecified Pneumonia and Staph hominis bacteremia    [   ] Due to (please specify): __________________________________   [   ] Other specification (please specify): ____________________   [   ] Clinically Undetermined           Please document in your progress notes daily for the duration of treatment until resolved, and include in your discharge summary.  Form No. [id number]

## 2022-10-18 ENCOUNTER — TELEPHONE (OUTPATIENT)
Dept: CARDIOLOGY | Facility: CLINIC | Age: 62
End: 2022-10-18
Payer: MEDICAID

## 2022-10-18 NOTE — TELEPHONE ENCOUNTER
Reached out to patient. No answer.     ----- Message from Anjelica Figueroa RN sent at 10/17/2022  9:51 AM CDT -----    ----- Message -----  From: Carly Dwyer  Sent: 10/17/2022   9:37 AM CDT  To: [facility] Cardiology Clinical Support, #    Type:  Sooner Apoointment Request    Caller is requesting a sooner appointment.  Caller declined first available appointment listed below.  Caller will not accept being placed on the waitlist and is requesting a message be sent to doctor.  Name of Caller:german   When is the first available appointment?none   Symptoms:pvc/ dyspnea   Would the patient rather a call back or a response via [facility]? Call back   Best Call Back Number:[phone]  Additional Information: pt states that they would like first available appt asap

## 2023-02-11 ENCOUNTER — HOSPITAL ENCOUNTER (INPATIENT)
Facility: HOSPITAL | Age: 63
LOS: 4 days | Discharge: HOSPICE | DRG: 871 | End: 2023-02-15
Attending: EMERGENCY MEDICINE | Admitting: HOSPITALIST
Payer: MEDICAID

## 2023-02-11 DIAGNOSIS — R06.02 SHORTNESS OF BREATH: ICD-10-CM

## 2023-02-11 DIAGNOSIS — R07.9 CHEST PAIN: ICD-10-CM

## 2023-02-11 DIAGNOSIS — Z51.5 PALLIATIVE CARE ENCOUNTER: ICD-10-CM

## 2023-02-11 DIAGNOSIS — K86.89 PANCREATIC MASS: ICD-10-CM

## 2023-02-11 DIAGNOSIS — Z71.89 ADVANCE CARE PLANNING: ICD-10-CM

## 2023-02-11 DIAGNOSIS — R11.0 NAUSEA: ICD-10-CM

## 2023-02-11 DIAGNOSIS — K72.00 ACUTE LIVER FAILURE WITHOUT HEPATIC COMA: Primary | ICD-10-CM

## 2023-02-11 DIAGNOSIS — Z71.89 GOALS OF CARE, COUNSELING/DISCUSSION: ICD-10-CM

## 2023-02-11 DIAGNOSIS — A41.9 SEPSIS, DUE TO UNSPECIFIED ORGANISM, UNSPECIFIED WHETHER ACUTE ORGAN DYSFUNCTION PRESENT: ICD-10-CM

## 2023-02-11 PROBLEM — E46 MALNUTRITION, CALORIE: Status: ACTIVE | Noted: 2020-06-17

## 2023-02-11 PROBLEM — E78.5 HYPERLIPIDEMIA: Status: ACTIVE | Noted: 2018-01-03

## 2023-02-11 PROBLEM — K21.9 GASTROESOPHAGEAL REFLUX DISEASE: Status: ACTIVE | Noted: 2020-06-17

## 2023-02-11 PROBLEM — Z86.718 HISTORY OF THROMBOEMBOLISM: Status: ACTIVE | Noted: 2020-06-17

## 2023-02-11 PROBLEM — H40.9 GLAUCOMA: Status: ACTIVE | Noted: 2018-01-03

## 2023-02-11 PROBLEM — R47.01: Status: ACTIVE | Noted: 2020-06-17

## 2023-02-11 PROBLEM — J44.9 CHRONIC OBSTRUCTIVE LUNG DISEASE: Status: ACTIVE | Noted: 2020-06-17

## 2023-02-11 PROBLEM — R65.10 SIRS (SYSTEMIC INFLAMMATORY RESPONSE SYNDROME): Status: RESOLVED | Noted: 2023-02-11 | Resolved: 2023-02-11

## 2023-02-11 PROBLEM — E87.1 HYPONATREMIA: Status: ACTIVE | Noted: 2023-02-11

## 2023-02-11 PROBLEM — Z74.01 BEDBOUND: Status: ACTIVE | Noted: 2019-12-15

## 2023-02-11 PROBLEM — I63.9 CEREBRAL INFARCTION: Status: ACTIVE | Noted: 2020-06-17

## 2023-02-11 PROBLEM — R51.9 HEADACHE: Status: ACTIVE | Noted: 2018-01-03

## 2023-02-11 PROBLEM — J15.212 PNEUMONIA DUE TO METHICILLIN RESISTANT STAPHYLOCOCCUS AUREUS: Status: ACTIVE | Noted: 2020-06-17

## 2023-02-11 PROBLEM — F32.9 MAJOR DEPRESSIVE DISORDER: Status: ACTIVE | Noted: 2020-06-17

## 2023-02-11 PROBLEM — D64.9 ANEMIA: Status: ACTIVE | Noted: 2022-09-24

## 2023-02-11 PROBLEM — I10 HYPERTENSIVE DISORDER: Status: ACTIVE | Noted: 2019-09-20

## 2023-02-11 PROBLEM — R65.10 SIRS (SYSTEMIC INFLAMMATORY RESPONSE SYNDROME): Status: ACTIVE | Noted: 2023-02-11

## 2023-02-11 PROBLEM — K30 NONULCER DYSPEPSIA: Status: ACTIVE | Noted: 2020-06-17

## 2023-02-11 PROBLEM — F10.10 ALCOHOL ABUSE: Status: ACTIVE | Noted: 2020-06-17

## 2023-02-11 LAB
ALBUMIN SERPL BCP-MCNC: 2.2 G/DL (ref 3.5–5.2)
ALLENS TEST: ABNORMAL
ALLENS TEST: ABNORMAL
ALP SERPL-CCNC: 1621 U/L (ref 55–135)
ALT SERPL W/O P-5'-P-CCNC: 393 U/L (ref 10–44)
AMMONIA PLAS-SCNC: 67 UMOL/L (ref 10–50)
ANION GAP SERPL CALC-SCNC: 16 MMOL/L (ref 8–16)
APAP SERPL-MCNC: 3.6 UG/ML (ref 10–20)
AST SERPL-CCNC: 448 U/L (ref 10–40)
BASOPHILS # BLD AUTO: 0.04 K/UL (ref 0–0.2)
BASOPHILS NFR BLD: 0.2 % (ref 0–1.9)
BILIRUB SERPL-MCNC: 29.3 MG/DL (ref 0.1–1)
BILIRUB UR QL STRIP: ABNORMAL
BNP SERPL-MCNC: 29 PG/ML (ref 0–99)
BUN SERPL-MCNC: 10 MG/DL (ref 8–23)
BUN SERPL-MCNC: 9 MG/DL (ref 6–30)
CALCIUM SERPL-MCNC: 9.3 MG/DL (ref 8.7–10.5)
CANCER AG19-9 SERPL-ACNC: ABNORMAL U/ML (ref 0–40)
CHLORIDE SERPL-SCNC: 84 MMOL/L (ref 95–110)
CHLORIDE SERPL-SCNC: 85 MMOL/L (ref 95–110)
CLARITY UR REFRACT.AUTO: CLEAR
CO2 SERPL-SCNC: 24 MMOL/L (ref 23–29)
COLOR UR AUTO: YELLOW
CREAT SERPL-MCNC: 0.5 MG/DL (ref 0.5–1.4)
CREAT SERPL-MCNC: 0.6 MG/DL (ref 0.5–1.4)
DELSYS: ABNORMAL
DELSYS: ABNORMAL
DIFFERENTIAL METHOD: ABNORMAL
EOSINOPHIL # BLD AUTO: 0 K/UL (ref 0–0.5)
EOSINOPHIL NFR BLD: 0.1 % (ref 0–8)
ERYTHROCYTE [DISTWIDTH] IN BLOOD BY AUTOMATED COUNT: 22.3 % (ref 11.5–14.5)
EST. GFR  (NO RACE VARIABLE): >60 ML/MIN/1.73 M^2
FIO2: 21
GLUCOSE SERPL-MCNC: 135 MG/DL (ref 70–110)
GLUCOSE SERPL-MCNC: 141 MG/DL (ref 70–110)
GLUCOSE UR QL STRIP: NEGATIVE
HAV IGM SERPL QL IA: NORMAL
HBV CORE IGM SERPL QL IA: NORMAL
HBV SURFACE AG SERPL QL IA: NORMAL
HCO3 UR-SCNC: 26.2 MMOL/L (ref 24–28)
HCO3 UR-SCNC: 28.5 MMOL/L (ref 24–28)
HCT VFR BLD AUTO: 37.4 % (ref 40–54)
HCT VFR BLD CALC: 37 %PCV (ref 36–54)
HCT VFR BLD CALC: 41 %PCV (ref 36–54)
HCV AB SERPL QL IA: NORMAL
HGB BLD-MCNC: 11.6 G/DL (ref 14–18)
HGB UR QL STRIP: NEGATIVE
IMM GRANULOCYTES # BLD AUTO: 0.12 K/UL (ref 0–0.04)
IMM GRANULOCYTES NFR BLD AUTO: 0.6 % (ref 0–0.5)
INFLUENZA A, MOLECULAR: NOT DETECTED
INFLUENZA B, MOLECULAR: NOT DETECTED
KETONES UR QL STRIP: NEGATIVE
LACTATE SERPL-SCNC: 1.1 MMOL/L (ref 0.5–2.2)
LACTATE SERPL-SCNC: 1.1 MMOL/L (ref 0.5–2.2)
LEUKOCYTE ESTERASE UR QL STRIP: NEGATIVE
LIPASE SERPL-CCNC: 45 U/L (ref 4–60)
LYMPHOCYTES # BLD AUTO: 1.1 K/UL (ref 1–4.8)
LYMPHOCYTES NFR BLD: 6 % (ref 18–48)
MCH RBC QN AUTO: 21.4 PG (ref 27–31)
MCHC RBC AUTO-ENTMCNC: 31 G/DL (ref 32–36)
MCV RBC AUTO: 69 FL (ref 82–98)
MODE: ABNORMAL
MONOCYTES # BLD AUTO: 1.7 K/UL (ref 0.3–1)
MONOCYTES NFR BLD: 9.1 % (ref 4–15)
NEUTROPHILS # BLD AUTO: 15.6 K/UL (ref 1.8–7.7)
NEUTROPHILS NFR BLD: 84 % (ref 38–73)
NITRITE UR QL STRIP: NEGATIVE
NRBC BLD-RTO: 0 /100 WBC
PCO2 BLDA: 33.1 MMHG (ref 35–45)
PCO2 BLDA: 76 MMHG (ref 35–45)
PH SMN: 7.14 [PH] (ref 7.35–7.45)
PH SMN: 7.54 [PH] (ref 7.35–7.45)
PH UR STRIP: 7 [PH] (ref 5–8)
PLATELET # BLD AUTO: 376 K/UL (ref 150–450)
PMV BLD AUTO: 11.6 FL (ref 9.2–12.9)
PO2 BLDA: 63 MMHG (ref 80–100)
PO2 BLDA: 65 MMHG (ref 40–60)
POC BE: -3 MMOL/L
POC BE: 6 MMOL/L
POC IONIZED CALCIUM: 0.95 MMOL/L (ref 1.06–1.42)
POC IONIZED CALCIUM: 0.96 MMOL/L (ref 1.06–1.42)
POC SATURATED O2: 84 % (ref 95–100)
POC SATURATED O2: 95 % (ref 95–100)
POC TCO2 (MEASURED): 30 MMOL/L (ref 23–29)
POC TCO2: 28 MMOL/L (ref 24–29)
POC TCO2: 30 MMOL/L (ref 23–27)
POTASSIUM BLD-SCNC: 2.3 MMOL/L (ref 3.5–5.1)
POTASSIUM BLD-SCNC: 2.7 MMOL/L (ref 3.5–5.1)
POTASSIUM SERPL-SCNC: 2.8 MMOL/L (ref 3.5–5.1)
PROT SERPL-MCNC: 7.2 G/DL (ref 6–8.4)
PROT UR QL STRIP: NEGATIVE
RBC # BLD AUTO: 5.41 M/UL (ref 4.6–6.2)
RSV AG BY MOLECULAR METHOD: NOT DETECTED
SAMPLE: ABNORMAL
SARS-COV-2 RNA RESP QL NAA+PROBE: NOT DETECTED
SITE: ABNORMAL
SITE: ABNORMAL
SODIUM BLD-SCNC: 125 MMOL/L (ref 136–145)
SODIUM BLD-SCNC: 127 MMOL/L (ref 136–145)
SODIUM SERPL-SCNC: 124 MMOL/L (ref 136–145)
SP GR UR STRIP: 1.01 (ref 1–1.03)
TROPONIN I SERPL DL<=0.01 NG/ML-MCNC: <0.006 NG/ML (ref 0–0.03)
URN SPEC COLLECT METH UR: ABNORMAL
WBC # BLD AUTO: 18.61 K/UL (ref 3.9–12.7)

## 2023-02-11 PROCEDURE — 84484 ASSAY OF TROPONIN QUANT: CPT

## 2023-02-11 PROCEDURE — 82140 ASSAY OF AMMONIA: CPT | Performed by: EMERGENCY MEDICINE

## 2023-02-11 PROCEDURE — 25000003 PHARM REV CODE 250: Performed by: EMERGENCY MEDICINE

## 2023-02-11 PROCEDURE — 36600 WITHDRAWAL OF ARTERIAL BLOOD: CPT

## 2023-02-11 PROCEDURE — 94761 N-INVAS EAR/PLS OXIMETRY MLT: CPT

## 2023-02-11 PROCEDURE — 83605 ASSAY OF LACTIC ACID: CPT | Mod: 91 | Performed by: EMERGENCY MEDICINE

## 2023-02-11 PROCEDURE — 87040 BLOOD CULTURE FOR BACTERIA: CPT | Mod: 59 | Performed by: EMERGENCY MEDICINE

## 2023-02-11 PROCEDURE — 99900035 HC TECH TIME PER 15 MIN (STAT)

## 2023-02-11 PROCEDURE — 99223 PR INITIAL HOSPITAL CARE,LEVL III: ICD-10-PCS | Mod: ,,, | Performed by: INTERNAL MEDICINE

## 2023-02-11 PROCEDURE — 93005 ELECTROCARDIOGRAM TRACING: CPT

## 2023-02-11 PROCEDURE — 81003 URINALYSIS AUTO W/O SCOPE: CPT

## 2023-02-11 PROCEDURE — 80074 ACUTE HEPATITIS PANEL: CPT | Performed by: EMERGENCY MEDICINE

## 2023-02-11 PROCEDURE — 82803 BLOOD GASES ANY COMBINATION: CPT

## 2023-02-11 PROCEDURE — 82330 ASSAY OF CALCIUM: CPT

## 2023-02-11 PROCEDURE — 86301 IMMUNOASSAY TUMOR CA 19-9: CPT

## 2023-02-11 PROCEDURE — 99285 PR EMERGENCY DEPT VISIT,LEVEL V: ICD-10-PCS | Mod: CS,,, | Performed by: EMERGENCY MEDICINE

## 2023-02-11 PROCEDURE — 80047 BASIC METABLC PNL IONIZED CA: CPT

## 2023-02-11 PROCEDURE — 96365 THER/PROPH/DIAG IV INF INIT: CPT

## 2023-02-11 PROCEDURE — 85025 COMPLETE CBC W/AUTO DIFF WBC: CPT

## 2023-02-11 PROCEDURE — 83880 ASSAY OF NATRIURETIC PEPTIDE: CPT

## 2023-02-11 PROCEDURE — 82800 BLOOD PH: CPT

## 2023-02-11 PROCEDURE — 80143 DRUG ASSAY ACETAMINOPHEN: CPT | Performed by: EMERGENCY MEDICINE

## 2023-02-11 PROCEDURE — 25500020 PHARM REV CODE 255

## 2023-02-11 PROCEDURE — 0241U SARS-COV2 (COVID) WITH FLU/RSV BY PCR: CPT

## 2023-02-11 PROCEDURE — 99223 1ST HOSP IP/OBS HIGH 75: CPT | Mod: ,,, | Performed by: INTERNAL MEDICINE

## 2023-02-11 PROCEDURE — 80053 COMPREHEN METABOLIC PANEL: CPT

## 2023-02-11 PROCEDURE — 84132 ASSAY OF SERUM POTASSIUM: CPT

## 2023-02-11 PROCEDURE — 93010 ELECTROCARDIOGRAM REPORT: CPT | Mod: ,,, | Performed by: INTERNAL MEDICINE

## 2023-02-11 PROCEDURE — 83690 ASSAY OF LIPASE: CPT

## 2023-02-11 PROCEDURE — 99285 EMERGENCY DEPT VISIT HI MDM: CPT | Mod: CS,,, | Performed by: EMERGENCY MEDICINE

## 2023-02-11 PROCEDURE — 84295 ASSAY OF SERUM SODIUM: CPT

## 2023-02-11 PROCEDURE — 85014 HEMATOCRIT: CPT

## 2023-02-11 PROCEDURE — 99285 EMERGENCY DEPT VISIT HI MDM: CPT | Mod: 25

## 2023-02-11 PROCEDURE — 12000002 HC ACUTE/MED SURGE SEMI-PRIVATE ROOM

## 2023-02-11 PROCEDURE — 96367 TX/PROPH/DG ADDL SEQ IV INF: CPT

## 2023-02-11 PROCEDURE — 96366 THER/PROPH/DIAG IV INF ADDON: CPT

## 2023-02-11 PROCEDURE — 93010 EKG 12-LEAD: ICD-10-PCS | Mod: ,,, | Performed by: INTERNAL MEDICINE

## 2023-02-11 PROCEDURE — 25000003 PHARM REV CODE 250

## 2023-02-11 PROCEDURE — 63600175 PHARM REV CODE 636 W HCPCS

## 2023-02-11 RX ORDER — POTASSIUM CHLORIDE 7.45 MG/ML
10 INJECTION INTRAVENOUS
Status: COMPLETED | OUTPATIENT
Start: 2023-02-11 | End: 2023-02-11

## 2023-02-11 RX ORDER — POTASSIUM CHLORIDE 20 MEQ/1
40 TABLET, EXTENDED RELEASE ORAL ONCE
Status: COMPLETED | OUTPATIENT
Start: 2023-02-11 | End: 2023-02-11

## 2023-02-11 RX ADMIN — POTASSIUM CHLORIDE 40 MEQ: 1500 TABLET, EXTENDED RELEASE ORAL at 06:02

## 2023-02-11 RX ADMIN — POTASSIUM CHLORIDE 10 MEQ: 7.46 INJECTION, SOLUTION INTRAVENOUS at 09:02

## 2023-02-11 RX ADMIN — PIPERACILLIN AND TAZOBACTAM 4.5 G: 4; .5 INJECTION, POWDER, LYOPHILIZED, FOR SOLUTION INTRAVENOUS; PARENTERAL at 04:02

## 2023-02-11 RX ADMIN — SODIUM CHLORIDE 2397 ML: 9 INJECTION, SOLUTION INTRAVENOUS at 03:02

## 2023-02-11 RX ADMIN — VANCOMYCIN HYDROCHLORIDE 1750 MG: 500 INJECTION, POWDER, LYOPHILIZED, FOR SOLUTION INTRAVENOUS at 06:02

## 2023-02-11 RX ADMIN — IOHEXOL 100 ML: 350 INJECTION, SOLUTION INTRAVENOUS at 07:02

## 2023-02-11 NOTE — ED TRIAGE NOTES
Anthony Borges, a 62 y.o. male presents to the ED w/ complaint of abnormal chest xray    Triage note: reported by EMS patient sent from nursing home for chest xray and jaundice just noticed today . Patient is poor historian and unable to elaborate.   Chief Complaint   Patient presents with    Abnormal Chest X-ray    Jaundice     Review of patient's allergies indicates:  No Known Allergies  No past medical history on file.

## 2023-02-11 NOTE — ED PROVIDER NOTES
Encounter Date: 2/11/2023       History     Chief Complaint   Patient presents with    Abnormal Chest X-ray    Jaundice     Pt is a 62 year old male with PMHx significant for HTN, COPD, and CVA with residual right sided contracture who presents for evaluation of jaundice, which was noticed by nursing staff 1 day ago. Nursing home staff reports an associated confusion and fatigue. Pt had an x-ray performed today which demonstrated left lowe lobe effusion, which prompted presentation to the ED. No fever, chest pain, shortness of breath, nausea or vomiting     Review of patient's allergies indicates:  No Known Allergies  Past Medical History:   Diagnosis Date    Alcohol abuse     Aphasia     COPD (chronic obstructive pulmonary disease)     GERD (gastroesophageal reflux disease)     Hypertension     Hypokalemia     Opioid abuse     Stroke     Unspecified glaucoma      History reviewed. No pertinent surgical history.  History reviewed. No pertinent family history.  Social History     Tobacco Use    Smoking status: Never    Smokeless tobacco: Never   Substance Use Topics    Drug use: Not Currently     Review of Systems   Constitutional:  Negative for chills and fever.   HENT:  Negative for ear discharge and ear pain.    Eyes:  Negative for photophobia and visual disturbance.   Respiratory:  Negative for cough and shortness of breath.    Cardiovascular:  Negative for chest pain.   Gastrointestinal:  Negative for diarrhea, nausea and vomiting.   Genitourinary:  Negative for dysuria and flank pain.   Musculoskeletal:  Negative for back pain and neck pain.   Skin:  Positive for color change.   Neurological:  Negative for weakness, numbness and headaches.   Psychiatric/Behavioral:  Positive for confusion.      Physical Exam     Initial Vitals [02/11/23 1404]   BP Pulse Resp Temp SpO2   90/64 110 20 98.2 °F (36.8 °C) 96 %      MAP       --         Physical Exam    Nursing note and vitals reviewed.  Constitutional: He is not  diaphoretic.   Chronically ill appearing male    HENT:   Head: Normocephalic and atraumatic.   Eyes: EOM are normal. Pupils are equal, round, and reactive to light. Scleral icterus is present.   Neck: Neck supple. No tracheal deviation present.   Cardiovascular:  Normal rate, regular rhythm and intact distal pulses.           No murmur heard.  Pulmonary/Chest: Breath sounds normal. No stridor. No respiratory distress. He has no wheezes. He has no rales.   Abdominal: He exhibits distension. There is abdominal tenderness. There is no rebound and no guarding.   Musculoskeletal:         General: No edema. Normal range of motion.      Cervical back: Neck supple.     Neurological: He is alert.   Oriented to person only, typically oriented x3 per nursing home    Skin: Skin is warm and dry. Capillary refill takes less than 2 seconds.   Jaundiced        ED Course   Procedures  Labs Reviewed   CBC W/ AUTO DIFFERENTIAL - Abnormal; Notable for the following components:       Result Value    WBC 18.61 (*)     Hemoglobin 11.6 (*)     Hematocrit 37.4 (*)     MCV 69 (*)     MCH 21.4 (*)     MCHC 31.0 (*)     RDW 22.3 (*)     Immature Granulocytes 0.6 (*)     Gran # (ANC) 15.6 (*)     Immature Grans (Abs) 0.12 (*)     Mono # 1.7 (*)     Gran % 84.0 (*)     Lymph % 6.0 (*)     All other components within normal limits   COMPREHENSIVE METABOLIC PANEL - Abnormal; Notable for the following components:    Sodium 124 (*)     Potassium 2.8 (*)     Chloride 84 (*)     Glucose 135 (*)     Albumin 2.2 (*)     Total Bilirubin 29.3 (*)     Alkaline Phosphatase 1,621 (*)      (*)      (*)     All other components within normal limits   URINALYSIS, REFLEX TO URINE CULTURE - Abnormal; Notable for the following components:    Bilirubin (UA) 3+ (*)     All other components within normal limits    Narrative:     Specimen Source->Urine   AMMONIA - Abnormal; Notable for the following components:    Ammonia 67 (*)     All other components  within normal limits   ACETAMINOPHEN LEVEL - Abnormal; Notable for the following components:    Acetaminophen (Tylenol), Serum 3.6 (*)     All other components within normal limits   CANCER ANTIGEN 19-9 - Abnormal; Notable for the following components:    CA 19-9 39841.1 (*)     All other components within normal limits    Narrative:     Add on Cancer Antigen 19-9 per Dr. Shine @ 20:51 pm to order #   [id number]   ISTAT PROCEDURE - Abnormal; Notable for the following components:    POC PH 7.145 (*)     POC PCO2 76.0 (*)     POC PO2 65 (*)     POC SATURATED O2 84 (*)     All other components within normal limits   ISTAT PROCEDURE - Abnormal; Notable for the following components:    POC Glucose 141 (*)     POC Sodium 125 (*)     POC Potassium 2.7 (*)     POC Chloride 85 (*)     POC TCO2 (MEASURED) 30 (*)     POC Ionized Calcium 0.95 (*)     All other components within normal limits   ISTAT PROCEDURE - Abnormal; Notable for the following components:    POC PH 7.544 (*)     POC PCO2 33.1 (*)     POC PO2 63 (*)     POC HCO3 28.5 (*)     POC Sodium 127 (*)     POC Potassium 2.3 (*)     POC TCO2 30 (*)     POC Ionized Calcium 0.96 (*)     All other components within normal limits   CULTURE, BLOOD   CULTURE, BLOOD   B-TYPE NATRIURETIC PEPTIDE   TROPONIN I   SARS-COV2 (COVID) WITH FLU/RSV BY PCR   LIPASE   HEPATITIS PANEL, ACUTE   LACTIC ACID, PLASMA   LACTIC ACID, PLASMA   CANCER ANTIGEN 19-9   ISTAT CHEM8          Imaging Results              CT Abdomen Pelvis With Contrast (Final result)  Result time 02/11/23 20:31:36      Final result by Himanshu Rosario DO (02/11/23 20:31:36)                   Impression:      1. Large mass in the pancreatic head resulting in severe pancreatic ductal dilation and severe intra and extrahepatic biliary ductal dilation.  Findings are highly concerning for malignancy and tissue sampling is recommended.  Abutment and mild narrowing of the portal confluence.  2. Additional pancreatic or  peripancreatic lesions as above.  3. Multiple mildly prominent lymph nodes at the root of the mesentery with associated mesenteric fat stranding.  While this may be seen with mesenteric panniculitis, metastatic disease is not excluded, recommend attention on follow-up imaging.  4. Suspected complete collapse of the left lower lobe.  Recommend further evaluation with CT chest to exclude metastatic disease.  5. Cholelithiasis and mild pericholecystic fluid, likely reactive.  If there is concern for acute cholecystitis, further evaluation with right upper quadrant ultrasound is recommended.  6. Small benign fat containing lesion in the left adrenal gland compatible with a myelolipoma or adenoma.      Electronically signed by: Himanshu Rosario  Date:    02/11/2023  Time:    20:31               Narrative:    EXAMINATION:  CT ABDOMEN PELVIS WITH CONTRAST    CLINICAL HISTORY:  Abdominal pain, acute, nonlocalized;    TECHNIQUE:  Axial CT images with sagittal and coronal reformats were obtained of the abdomen and pelvis from the hemidiaphragms through the symphysis pubis after the administration of 100mL Omnipaque 350.    COMPARISON:  None available.    FINDINGS:  Examination is limited due to streak artifact from the right arm patient's arm which is within the field of view.    Lung Bases: There is complete opacification of the partially imaged left lower lobe with leftward mediastinal shift suspicious for left lower lobe collapse.  Opacification of the left lower lobe bronchi noted.  There is a small left pleural effusion.  Mild heterogeneous opacities also noted in the lingula.  The right lung base is grossly clear.    Heart: The heart is mildly enlarged.  No pericardial effusion.    Liver: The liver is normal in size and demonstrates homogeneous enhancement without focal lesion.  The portal vasculature is patent.    Biliary tract: There is severe intra and extrahepatic biliary ductal dilation.    Gallbladder: The  gallbladder is distended.  There is mild pericholecystic fluid which is likely reactive.  There are several small layering gallstones.    Pancreas: There is a 2.3 x 3.2 cm pancreatic head mass with resultant severe dilation the pancreatic duct.  The duct measures up to approximately 1.4 cm when measured in the region of the pancreatic neck.  There is an additional hypoattenuating focus in or adjacent to the posterior aspect of the pancreatic head measuring 1.5 cm (series 2, image 69).  There is atrophy of the body and tail of the pancreas.  There is a 1.6 cm lesion in or adjacent to the pancreatic tail (series 2, image 39).    Spleen: Normal size without focal lesion.  There is a surgical clip at the splenic hilum.    Adrenals: There is a 1.1 cm fat containing lesion in the adrenal gland compatible with a small myelolipoma or adenoma.    Kidneys and urinary collecting systems: Too small to characterize hypodensity in the left kidney noted.  Otherwise the kidneys are normal.  No hydronephrosis or urolithiasis.    Lymph nodes: There are several mildly prominent nodes at the root of the mesentery with associated mesenteric stranding, findings which can be seen with mesenteric panniculitis alternatively, metastatic adenopathy cannot be entirely excluded.    Stomach and bowel: The stomach is normal.  There is a large amount of stool with distention of the cecum.  No evidence of volvulus, large bowel obstruction, wall thickening, or pneumatosis.  The small bowel is normal in caliber without bowel obstruction.  The appendix is normal.    Peritoneum and mesentery: No ascites or free intraperitoneal air.  No abdominal fluid collection.    Vasculature: There is abutment and mild narrowing of the portal confluence.  No abutment of the SMA.  There is an IVC filter in place.  Minimal atherosclerosis.  No aneurysm the    Urinary bladder: Contracted around a Roca catheter.  There is air in the bladder lumen, expected post  instrumentation.  There is bladder wall thickening, likely due to contracted status.    Reproductive organs: The prostate is normal in size.    Body wall: There is a fat containing umbilical hernia.    Musculoskeletal: There is heterogeneity of the right proximal femur with a suspected ghost tract from a prior intramedullary nail, and high-grade heterotopic ossification of the right proximal femur, correlate for history of recent trauma or surgery.  There are postoperative changes at the left sacroiliac joint.  There are remote fractures of the bilateral inferior and superior pubic rami.  There are multiple remote healed posterior left-sided rib fractures.  There are degenerative changes of the left hip and lumbar spine.  There is no evidence of an aggressive osseous lesion to suggest osseous metastatic disease.                                       CT Head Without Contrast (Final result)  Result time 02/11/23 19:53:35      Final result by Chris Cordova MD (02/11/23 19:53:35)                   Impression:      No detrimental change, acute large vascular territory infarct or intracranial hemorrhage identified.  If persistent neurologic deficit, MRI brain can be obtained.    Grossly stable sequela of chronic microvascular ischemic change, senescent change and remote infarcts involving the left temporoparietal region and left thalamus.    Stable asymmetry of the lateral ventricles, suggesting ex vacuo dilatation.    Slightly improved sinus disease compared to 05/12/2022, as above.      Electronically signed by: Chris Cordova MD  Date:    02/11/2023  Time:    19:53               Narrative:    EXAMINATION:  CT HEAD WITHOUT CONTRAST    CLINICAL HISTORY:  altered mental status;    TECHNIQUE:  Low dose axial CT images obtained throughout the head without intravenous contrast. Sagittal and coronal reconstructions were performed.    COMPARISON:  Head CT 05/12/2022    FINDINGS:  Intracranial compartment:    Generalized  cerebral volume loss.  Patchy hypoattenuation of the subcortical and periventricular white matter, left more than right, likely sequela of chronic microvascular ischemic change.  There is remote infarct involving the left thalamus.  Encephalomalacia within the left temporal and parietal lobes, noting scattered punctate foci of calcification, stable.  No extra-axial blood or fluid collections.  The ventricles appear stable without distortion by mass effect or acute hydrocephalus noting similar ex vacuo dilatation of the left lateral ventricle and cavum septum pellucidum.    No definite new focal areas of abnormal parenchymal attenuation.  No parenchymal mass, hemorrhage, edema or acute major vascular distribution infarct.  Skull base atherosclerotic vascular calcifications noted.    Skull/extracranial contents (limited evaluation): No fracture. Patchy mucosal thickening of the paranasal sinuses with air-fluid levels in the bilateral sphenoid sinuses, slightly improved from 05/12/2022 study.  Right mastoid air cells are clear.  Similar minimal partial opacification of left mastoid air cells.  Imaged portions of the orbits are within normal limits.                                       X-Ray Chest 1 View (Final result)  Result time 02/11/23 16:57:08      Final result by Tara Dwyer MD (02/11/23 16:57:08)                   Impression:      Today's findings are suggestive of a combination of left lung atelectasis causing shift of the central structures and pleural effusion.      Electronically signed by: Tara Dwyer MD  Date:    02/11/2023  Time:    16:57               Narrative:    EXAMINATION:  XR CHEST 1 VIEW    CLINICAL HISTORY:  Shortness of breath;    TECHNIQUE:  Single frontal view of the chest was performed.    COMPARISON:  05/13/2022    FINDINGS:  The lungs are symmetrically hypoinflated.  Although the patient is rotated on this exam.  There may be a component of volume loss secondary to atelectasis  causing a shift of the mediastinum and cardiac silhouette to the left.  There is dense airspace opacification which extends to the left lung apex.  The right lung is free of abnormality.                                       X-Ray Chest PA And Lateral (In process)                      Medications   potassium chloride 10 mEq in 100 mL IVPB (has no administration in time range)   vancomycin - pharmacy to dose (has no administration in time range)   piperacillin-tazobactam (ZOSYN) 4.5 g in dextrose 5 % in water (D5W) 5 % 100 mL IVPB (MB+) (has no administration in time range)   piperacillin-tazobactam (ZOSYN) 4.5 g in dextrose 5 % in water (D5W) 5 % 100 mL IVPB (MB+) (0 g Intravenous Stopped 2/11/23 1822)   sodium chloride 0.9% bolus 2,397 mL 2,397 mL (0 mLs Intravenous Stopped 2/11/23 1822)   vancomycin 1.75 g in 5 % dextrose 500 mL IVPB (0 mg Intravenous Stopped 2/11/23 2104)   potassium chloride SA CR tablet 40 mEq (40 mEq Oral Given 2/11/23 1827)   potassium chloride 10 mEq in 100 mL IVPB (0 mEq Intravenous Stopped 2/11/23 2243)   iohexoL (OMNIPAQUE 350) injection 100 mL (100 mLs Intravenous Given 2/11/23 1923)     Medical Decision Making:   History:   Old Medical Records: I decided to obtain old medical records.  Initial Assessment:   Pt presents for evaluation of altered mental status, fatigue, and jaundice   Differential Diagnosis:   Cholangitis, hepatitis, sepsis, UTI, pancreatitis, covid, flu, pneumonia, malignancy   Clinical Tests:   Lab Tests: Ordered and Reviewed  Radiological Study: Ordered and Reviewed  Medical Tests: Ordered and Reviewed  ED Management:  Pt presents hypotensive and tachycardic. Concern for sepsis. Pt treated with broad spectrum antibiotics and IV fluids. Lab work up significant for leukocytosis, elevated alk phos at 1600, and elevated bilirubin at 29. CT abdomen pelvis concerning for pancreatic mass and metastatic disease and equivocal for cholecystitis. General surgery consulted, case  discussed, and they evaluated the pt in the ED. Do not feel he is a surgical candidate. Recommend AES consult and medicine admit. MICU consulted, case discussed, and pt evaluated in the ED. Sophia pt stable for the floor. Pt subsequently admitted to hospital medicine. Spoke with pt's sister Dalia and updated her on the pt's condition and plan for admission. Spoke with her about the concern for pancreatic cancer and that his plan of care would be determined upon his admission           Attending Attestation:   Physician Attestation Statement for Resident:  As the supervising MD   Physician Attestation Statement: I have personally seen and examined this patient.   I agree with the above history.  -:   As the supervising MD I agree with the above PE.     As the supervising MD I agree with the above treatment, course, plan, and disposition.                  ED Course as of 02/12/23 0109   Sat Feb 11, 2023   1705 BILIRUBIN TOTAL(!): 29.3 [AS]      ED Course User Index  [AS] Miguel Angel Fernandes Jr., MD                 Clinical Impression:   Final diagnoses:  [R06.02] Shortness of breath  [K72.00] Acute liver failure without hepatic coma (Primary)        ED Disposition Condition    Admit Stable                Miguel Angel Fernandes Jr., MD  Resident  02/12/23 0111       Ju Rodriguez MD  02/13/23 1059

## 2023-02-11 NOTE — ED NOTES
I-STAT Chem-8+ Results:   Value Reference Range   Sodium 125 136-145 mmol/L   Potassium  2.7 3.5-5.1 mmol/L   Chloride 85  mmol/L   Ionized Calcium 0.95 1.06-1.42 mmol/L   CO2 (measured) 30 23-29 mmol/L   Glucose 141  mg/dL   BUN 9 6-30 mg/dL   Creatinine 0.5 0.5-1.4 mg/dL   Hematocrit 41 36-54%

## 2023-02-12 PROBLEM — R74.8 ALKALINE PHOSPHATASE ELEVATION: Status: ACTIVE | Noted: 2023-02-12

## 2023-02-12 PROBLEM — R74.8 ALKALINE PHOSPHATASE ELEVATION: Status: RESOLVED | Noted: 2023-02-12 | Resolved: 2023-02-12

## 2023-02-12 PROBLEM — R41.82 ALTERED MENTAL STATE: Status: RESOLVED | Noted: 2023-02-12 | Resolved: 2023-02-12

## 2023-02-12 PROBLEM — R41.82 ALTERED MENTAL STATE: Status: ACTIVE | Noted: 2023-02-12

## 2023-02-12 PROBLEM — E72.20 HYPERAMMONEMIA: Status: ACTIVE | Noted: 2023-02-12

## 2023-02-12 PROBLEM — G93.40 ACUTE ENCEPHALOPATHY: Status: ACTIVE | Noted: 2023-02-12

## 2023-02-12 PROBLEM — R97.8 ELEVATED CA 19-9 LEVEL: Status: ACTIVE | Noted: 2023-02-12

## 2023-02-12 PROBLEM — K80.20 CHOLELITHIASIS: Status: RESOLVED | Noted: 2023-02-12 | Resolved: 2023-02-12

## 2023-02-12 PROBLEM — K80.20 CHOLELITHIASIS: Status: ACTIVE | Noted: 2023-02-12

## 2023-02-12 PROBLEM — R74.01 TRANSAMINITIS: Status: ACTIVE | Noted: 2023-02-12

## 2023-02-12 LAB
ALBUMIN SERPL BCP-MCNC: 1.9 G/DL (ref 3.5–5.2)
ALP SERPL-CCNC: 1231 U/L (ref 55–135)
ALT SERPL W/O P-5'-P-CCNC: 325 U/L (ref 10–44)
ANION GAP SERPL CALC-SCNC: 15 MMOL/L (ref 8–16)
ANISOCYTOSIS BLD QL SMEAR: SLIGHT
AST SERPL-CCNC: 357 U/L (ref 10–40)
BASOPHILS # BLD AUTO: 0.05 K/UL (ref 0–0.2)
BASOPHILS NFR BLD: 0.2 % (ref 0–1.9)
BILIRUB SERPL-MCNC: 27.3 MG/DL (ref 0.1–1)
BUN SERPL-MCNC: 8 MG/DL (ref 8–23)
CALCIUM SERPL-MCNC: 8.3 MG/DL (ref 8.7–10.5)
CHLORIDE SERPL-SCNC: 90 MMOL/L (ref 95–110)
CO2 SERPL-SCNC: 22 MMOL/L (ref 23–29)
CREAT SERPL-MCNC: 0.6 MG/DL (ref 0.5–1.4)
DIFFERENTIAL METHOD: ABNORMAL
EOSINOPHIL # BLD AUTO: 0 K/UL (ref 0–0.5)
EOSINOPHIL NFR BLD: 0 % (ref 0–8)
ERYTHROCYTE [DISTWIDTH] IN BLOOD BY AUTOMATED COUNT: 22.1 % (ref 11.5–14.5)
EST. GFR  (NO RACE VARIABLE): >60 ML/MIN/1.73 M^2
GLUCOSE SERPL-MCNC: 103 MG/DL (ref 70–110)
GLUCOSE SERPL-MCNC: 113 MG/DL (ref 70–110)
HCT VFR BLD AUTO: 32.2 % (ref 40–54)
HGB BLD-MCNC: 10.4 G/DL (ref 14–18)
HYPOCHROMIA BLD QL SMEAR: ABNORMAL
IMM GRANULOCYTES # BLD AUTO: 0.29 K/UL (ref 0–0.04)
IMM GRANULOCYTES NFR BLD AUTO: 1 % (ref 0–0.5)
LYMPHOCYTES # BLD AUTO: 1.8 K/UL (ref 1–4.8)
LYMPHOCYTES NFR BLD: 5.9 % (ref 18–48)
MAGNESIUM SERPL-MCNC: 1.2 MG/DL (ref 1.6–2.6)
MCH RBC QN AUTO: 22.1 PG (ref 27–31)
MCHC RBC AUTO-ENTMCNC: 32.3 G/DL (ref 32–36)
MCV RBC AUTO: 69 FL (ref 82–98)
MONOCYTES # BLD AUTO: 2.7 K/UL (ref 0.3–1)
MONOCYTES NFR BLD: 8.8 % (ref 4–15)
NEUTROPHILS # BLD AUTO: 25.5 K/UL (ref 1.8–7.7)
NEUTROPHILS NFR BLD: 84.1 % (ref 38–73)
NRBC BLD-RTO: 0 /100 WBC
OSMOLALITY SERPL: 273 MOSM/KG (ref 280–300)
OSMOLALITY UR: 347 MOSM/KG (ref 50–1200)
PHOSPHATE SERPL-MCNC: 2.3 MG/DL (ref 2.7–4.5)
PLATELET # BLD AUTO: 352 K/UL (ref 150–450)
PLATELET BLD QL SMEAR: ABNORMAL
PMV BLD AUTO: 10.8 FL (ref 9.2–12.9)
POCT GLUCOSE: 113 MG/DL (ref 70–110)
POCT GLUCOSE: 121 MG/DL (ref 70–110)
POCT GLUCOSE: 129 MG/DL (ref 70–110)
POCT GLUCOSE: 131 MG/DL (ref 70–110)
POCT GLUCOSE: 97 MG/DL (ref 70–110)
POLYCHROMASIA BLD QL SMEAR: ABNORMAL
POTASSIUM SERPL-SCNC: 4.3 MMOL/L (ref 3.5–5.1)
PROT SERPL-MCNC: 6.1 G/DL (ref 6–8.4)
RBC # BLD AUTO: 4.7 M/UL (ref 4.6–6.2)
SODIUM SERPL-SCNC: 127 MMOL/L (ref 136–145)
SODIUM UR-SCNC: 18 MMOL/L (ref 20–250)
TARGETS BLD QL SMEAR: ABNORMAL
WBC # BLD AUTO: 30.27 K/UL (ref 3.9–12.7)

## 2023-02-12 PROCEDURE — 84300 ASSAY OF URINE SODIUM: CPT | Performed by: STUDENT IN AN ORGANIZED HEALTH CARE EDUCATION/TRAINING PROGRAM

## 2023-02-12 PROCEDURE — 99291 PR CRITICAL CARE, E/M 30-74 MINUTES: ICD-10-PCS | Mod: ,,, | Performed by: INTERNAL MEDICINE

## 2023-02-12 PROCEDURE — 83935 ASSAY OF URINE OSMOLALITY: CPT | Performed by: STUDENT IN AN ORGANIZED HEALTH CARE EDUCATION/TRAINING PROGRAM

## 2023-02-12 PROCEDURE — 80053 COMPREHEN METABOLIC PANEL: CPT | Performed by: EMERGENCY MEDICINE

## 2023-02-12 PROCEDURE — 99223 1ST HOSP IP/OBS HIGH 75: CPT | Mod: ,,, | Performed by: STUDENT IN AN ORGANIZED HEALTH CARE EDUCATION/TRAINING PROGRAM

## 2023-02-12 PROCEDURE — 99223 1ST HOSP IP/OBS HIGH 75: CPT | Mod: ,,, | Performed by: HOSPITALIST

## 2023-02-12 PROCEDURE — 84100 ASSAY OF PHOSPHORUS: CPT | Performed by: EMERGENCY MEDICINE

## 2023-02-12 PROCEDURE — 25000003 PHARM REV CODE 250

## 2023-02-12 PROCEDURE — 20000000 HC ICU ROOM

## 2023-02-12 PROCEDURE — 99223 PR INITIAL HOSPITAL CARE,LEVL III: ICD-10-PCS | Mod: ,,, | Performed by: STUDENT IN AN ORGANIZED HEALTH CARE EDUCATION/TRAINING PROGRAM

## 2023-02-12 PROCEDURE — 99223 PR INITIAL HOSPITAL CARE,LEVL III: ICD-10-PCS | Mod: ,,, | Performed by: INTERNAL MEDICINE

## 2023-02-12 PROCEDURE — 63700000 PHARM REV CODE 250 ALT 637 W/O HCPCS

## 2023-02-12 PROCEDURE — 63600175 PHARM REV CODE 636 W HCPCS

## 2023-02-12 PROCEDURE — 99223 1ST HOSP IP/OBS HIGH 75: CPT | Mod: ,,, | Performed by: INTERNAL MEDICINE

## 2023-02-12 PROCEDURE — 63600175 PHARM REV CODE 636 W HCPCS: Performed by: STUDENT IN AN ORGANIZED HEALTH CARE EDUCATION/TRAINING PROGRAM

## 2023-02-12 PROCEDURE — 85025 COMPLETE CBC W/AUTO DIFF WBC: CPT | Performed by: STUDENT IN AN ORGANIZED HEALTH CARE EDUCATION/TRAINING PROGRAM

## 2023-02-12 PROCEDURE — 99223 PR INITIAL HOSPITAL CARE,LEVL III: ICD-10-PCS | Mod: ,,, | Performed by: HOSPITALIST

## 2023-02-12 PROCEDURE — 83930 ASSAY OF BLOOD OSMOLALITY: CPT | Performed by: STUDENT IN AN ORGANIZED HEALTH CARE EDUCATION/TRAINING PROGRAM

## 2023-02-12 PROCEDURE — 25000003 PHARM REV CODE 250: Performed by: EMERGENCY MEDICINE

## 2023-02-12 PROCEDURE — 25000003 PHARM REV CODE 250: Performed by: HOSPITALIST

## 2023-02-12 PROCEDURE — 25000003 PHARM REV CODE 250: Performed by: STUDENT IN AN ORGANIZED HEALTH CARE EDUCATION/TRAINING PROGRAM

## 2023-02-12 PROCEDURE — 83735 ASSAY OF MAGNESIUM: CPT | Performed by: EMERGENCY MEDICINE

## 2023-02-12 PROCEDURE — 94761 N-INVAS EAR/PLS OXIMETRY MLT: CPT

## 2023-02-12 PROCEDURE — 63600175 PHARM REV CODE 636 W HCPCS: Performed by: EMERGENCY MEDICINE

## 2023-02-12 PROCEDURE — 63600175 PHARM REV CODE 636 W HCPCS: Performed by: HOSPITALIST

## 2023-02-12 PROCEDURE — 99291 CRITICAL CARE FIRST HOUR: CPT | Mod: ,,, | Performed by: INTERNAL MEDICINE

## 2023-02-12 RX ORDER — BRIMONIDINE TARTRATE 2 MG/ML
1 SOLUTION/ DROPS OPHTHALMIC 2 TIMES DAILY
Status: DISCONTINUED | OUTPATIENT
Start: 2023-02-12 | End: 2023-02-15 | Stop reason: HOSPADM

## 2023-02-12 RX ORDER — AZITHROMYCIN 250 MG/1
250 TABLET, FILM COATED ORAL DAILY
Status: DISCONTINUED | OUTPATIENT
Start: 2023-02-13 | End: 2023-02-12

## 2023-02-12 RX ORDER — SODIUM CHLORIDE 0.9 % (FLUSH) 0.9 %
10 SYRINGE (ML) INJECTION EVERY 12 HOURS PRN
Status: DISCONTINUED | OUTPATIENT
Start: 2023-02-12 | End: 2023-02-15 | Stop reason: HOSPADM

## 2023-02-12 RX ORDER — NOREPINEPHRINE BITARTRATE/D5W 4MG/250ML
0-3 PLASTIC BAG, INJECTION (ML) INTRAVENOUS CONTINUOUS
Status: DISCONTINUED | OUTPATIENT
Start: 2023-02-12 | End: 2023-02-12

## 2023-02-12 RX ORDER — MAG HYDROX/ALUMINUM HYD/SIMETH 200-200-20
30 SUSPENSION, ORAL (FINAL DOSE FORM) ORAL EVERY 4 HOURS PRN
Status: DISCONTINUED | OUTPATIENT
Start: 2023-02-12 | End: 2023-02-15 | Stop reason: HOSPADM

## 2023-02-12 RX ORDER — LEVETIRACETAM 500 MG/1
500 TABLET ORAL 2 TIMES DAILY
Status: DISCONTINUED | OUTPATIENT
Start: 2023-02-12 | End: 2023-02-15 | Stop reason: HOSPADM

## 2023-02-12 RX ORDER — LACTULOSE 10 G/15ML
20 SOLUTION ORAL 2 TIMES DAILY
Status: DISCONTINUED | OUTPATIENT
Start: 2023-02-12 | End: 2023-02-12

## 2023-02-12 RX ORDER — IBUPROFEN 200 MG
16 TABLET ORAL
Status: DISCONTINUED | OUTPATIENT
Start: 2023-02-12 | End: 2023-02-15 | Stop reason: HOSPADM

## 2023-02-12 RX ORDER — LACTULOSE 10 G/15ML
20 SOLUTION ORAL 3 TIMES DAILY
Status: DISCONTINUED | OUTPATIENT
Start: 2023-02-12 | End: 2023-02-12

## 2023-02-12 RX ORDER — INSULIN ASPART 100 [IU]/ML
0-5 INJECTION, SOLUTION INTRAVENOUS; SUBCUTANEOUS
Status: DISCONTINUED | OUTPATIENT
Start: 2023-02-12 | End: 2023-02-15 | Stop reason: HOSPADM

## 2023-02-12 RX ORDER — AZITHROMYCIN 250 MG/1
500 TABLET, FILM COATED ORAL ONCE
Status: COMPLETED | OUTPATIENT
Start: 2023-02-12 | End: 2023-02-12

## 2023-02-12 RX ORDER — NOREPINEPHRINE BITARTRATE/D5W 4MG/250ML
0-.2 PLASTIC BAG, INJECTION (ML) INTRAVENOUS CONTINUOUS
Status: DISPENSED | OUTPATIENT
Start: 2023-02-12 | End: 2023-02-13

## 2023-02-12 RX ORDER — FAMOTIDINE 20 MG/1
20 TABLET, FILM COATED ORAL DAILY
Status: DISCONTINUED | OUTPATIENT
Start: 2023-02-12 | End: 2023-02-15 | Stop reason: HOSPADM

## 2023-02-12 RX ORDER — ALBUTEROL SULFATE 90 UG/1
2 AEROSOL, METERED RESPIRATORY (INHALATION) EVERY 4 HOURS PRN
Status: DISCONTINUED | OUTPATIENT
Start: 2023-02-12 | End: 2023-02-15 | Stop reason: HOSPADM

## 2023-02-12 RX ORDER — HEPARIN SODIUM 5000 [USP'U]/ML
5000 INJECTION, SOLUTION INTRAVENOUS; SUBCUTANEOUS EVERY 8 HOURS
Status: DISCONTINUED | OUTPATIENT
Start: 2023-02-12 | End: 2023-02-15 | Stop reason: HOSPADM

## 2023-02-12 RX ORDER — GLUCAGON 1 MG
1 KIT INJECTION
Status: DISCONTINUED | OUTPATIENT
Start: 2023-02-12 | End: 2023-02-15 | Stop reason: HOSPADM

## 2023-02-12 RX ORDER — MAGNESIUM SULFATE HEPTAHYDRATE 40 MG/ML
2 INJECTION, SOLUTION INTRAVENOUS ONCE
Status: COMPLETED | OUTPATIENT
Start: 2023-02-12 | End: 2023-02-12

## 2023-02-12 RX ORDER — ASCORBIC ACID 500 MG
500 TABLET ORAL DAILY
Status: DISCONTINUED | OUTPATIENT
Start: 2023-02-12 | End: 2023-02-14

## 2023-02-12 RX ORDER — NALOXONE HCL 0.4 MG/ML
0.02 VIAL (ML) INJECTION
Status: DISCONTINUED | OUTPATIENT
Start: 2023-02-12 | End: 2023-02-15 | Stop reason: HOSPADM

## 2023-02-12 RX ORDER — ERGOCALCIFEROL 1.25 MG/1
50000 CAPSULE ORAL
Status: DISCONTINUED | OUTPATIENT
Start: 2023-02-16 | End: 2023-02-15 | Stop reason: HOSPADM

## 2023-02-12 RX ORDER — POTASSIUM CHLORIDE 7.45 MG/ML
10 INJECTION INTRAVENOUS
Status: DISCONTINUED | OUTPATIENT
Start: 2023-02-12 | End: 2023-02-12

## 2023-02-12 RX ORDER — IBUPROFEN 200 MG
24 TABLET ORAL
Status: DISCONTINUED | OUTPATIENT
Start: 2023-02-12 | End: 2023-02-15 | Stop reason: HOSPADM

## 2023-02-12 RX ADMIN — LEVETIRACETAM 500 MG: 500 TABLET, FILM COATED ORAL at 08:02

## 2023-02-12 RX ADMIN — POTASSIUM BICARBONATE 50 MEQ: 978 TABLET, EFFERVESCENT ORAL at 02:02

## 2023-02-12 RX ADMIN — SODIUM CHLORIDE, POTASSIUM CHLORIDE, SODIUM LACTATE AND CALCIUM CHLORIDE 500 ML: 600; 310; 30; 20 INJECTION, SOLUTION INTRAVENOUS at 03:02

## 2023-02-12 RX ADMIN — MAGNESIUM SULFATE 2 G: 2 INJECTION INTRAVENOUS at 07:02

## 2023-02-12 RX ADMIN — VANCOMYCIN HYDROCHLORIDE 1500 MG: 1.5 INJECTION, POWDER, LYOPHILIZED, FOR SOLUTION INTRAVENOUS at 06:02

## 2023-02-12 RX ADMIN — NOREPINEPHRINE BITARTRATE 0.02 MCG/KG/MIN: 4 INJECTION, SOLUTION INTRAVENOUS at 07:02

## 2023-02-12 RX ADMIN — OXYCODONE HYDROCHLORIDE AND ACETAMINOPHEN 500 MG: 500 TABLET ORAL at 08:02

## 2023-02-12 RX ADMIN — HEPARIN SODIUM 5000 UNITS: 5000 INJECTION INTRAVENOUS; SUBCUTANEOUS at 05:02

## 2023-02-12 RX ADMIN — LACTULOSE 20 G: 20 SOLUTION ORAL at 08:02

## 2023-02-12 RX ADMIN — VANCOMYCIN HYDROCHLORIDE 1750 MG: 5 INJECTION, POWDER, LYOPHILIZED, FOR SOLUTION INTRAVENOUS at 05:02

## 2023-02-12 RX ADMIN — BRIMONIDINE TARTRATE 1 DROP: 2 SOLUTION OPHTHALMIC at 02:02

## 2023-02-12 RX ADMIN — AZITHROMYCIN MONOHYDRATE 500 MG: 250 TABLET ORAL at 05:02

## 2023-02-12 RX ADMIN — BRIMONIDINE TARTRATE 1 DROP: 2 SOLUTION OPHTHALMIC at 08:02

## 2023-02-12 RX ADMIN — PIPERACILLIN SODIUM AND TAZOBACTAM SODIUM 4.5 G: 4; .5 INJECTION, POWDER, FOR SOLUTION INTRAVENOUS at 02:02

## 2023-02-12 RX ADMIN — BRIMONIDINE TARTRATE 1 DROP: 2 SOLUTION OPHTHALMIC at 11:02

## 2023-02-12 RX ADMIN — SODIUM PHOSPHATE, MONOBASIC, MONOHYDRATE AND SODIUM PHOSPHATE, DIBASIC, ANHYDROUS 30 MMOL: 142; 276 INJECTION, SOLUTION INTRAVENOUS at 09:02

## 2023-02-12 RX ADMIN — PIPERACILLIN SODIUM AND TAZOBACTAM SODIUM 4.5 G: 4; .5 INJECTION, POWDER, FOR SOLUTION INTRAVENOUS at 06:02

## 2023-02-12 RX ADMIN — POTASSIUM BICARBONATE 50 MEQ: 978 TABLET, EFFERVESCENT ORAL at 04:02

## 2023-02-12 RX ADMIN — FAMOTIDINE 20 MG: 20 TABLET, FILM COATED ORAL at 08:02

## 2023-02-12 RX ADMIN — PIPERACILLIN SODIUM AND TAZOBACTAM SODIUM 4.5 G: 4; .5 INJECTION, POWDER, FOR SOLUTION INTRAVENOUS at 10:02

## 2023-02-12 NOTE — ASSESSMENT & PLAN NOTE
Ammonia elevated to 67 on admission.  Given elevated hepatobiliary enzymes, differential includes hepatic encephalopathy possibly contributing to patient's altered mental status.    PLAN  Lactulose 20g BID. Adjust dose to achieve 2 to 3 soft stools/day

## 2023-02-12 NOTE — HPI
Mr. Borges is a 60 yo M PMH COPD, CVA with residual R sided hemiplegia, opioid and alcohol abuse, venous thromboembolism with IVC filter presents with jaundice. He lives in NH.  Found to have 3 cm pancreatic head mass with compression of the portal vein and marked dilation of pancreatic duct and biliary tree. There are satellite lesions in the tail of the pancreas and posterior to the head of the pancreas. There is root of mesentery lymphadenopathy, possibly malignant. There is left lower lobe collapse, possibly from metastatic disease to the lung.    He is AF, mildly tachycardic.    Tbili is 29  Ca 19-9 12,099  Alk phos 1600  WBC 16

## 2023-02-12 NOTE — ASSESSMENT & PLAN NOTE
CT abdomen pelvis[02/11/23] with concerns for large pancreatic head mass with severe pancreatic ductal dilation and intra/extrahepatic biliary ductal dilation.  Multiple prominent lymph nodes concerning for metastatic disease. Initial labs with CA 19-9(49393.1) and hepatobiliary enzymes elevation(, , TBili 29.3). Patient with jaundice and abdominal pain on initial physical exam .    -General surgery was consulted and noted gallbladder inflammation likely from biliary obstruction secondary to pancreatic mass.  Also noted that given his co-morbidities he would be a poor surgical candidate to tolerate a Whipple procedure.    PLAN  -Gastroenterology([facility]) consulted.   -Heme-Onc consulted  -Home atorvastatin held due to transaminitis concerns

## 2023-02-12 NOTE — SUBJECTIVE & OBJECTIVE
Past Medical History:   Diagnosis Date    Alcohol abuse     Aphasia     COPD (chronic obstructive pulmonary disease)     GERD (gastroesophageal reflux disease)     Hypertension     Hypokalemia     Opioid abuse     Stroke     Unspecified glaucoma        History reviewed. No pertinent surgical history.    Review of patient's allergies indicates:  No Known Allergies    Family History    None       Tobacco Use    Smoking status: Never    Smokeless tobacco: Never   Substance and Sexual Activity    Alcohol use: Not on file    Drug use: Not Currently    Sexual activity: Not on file      Review of Systems   Constitutional:  Positive for activity change. Negative for chills and fever.   HENT:  Negative for congestion and sore throat.    Eyes:  Negative for pain.   Respiratory:  Negative for cough and shortness of breath.    Cardiovascular:  Negative for chest pain, palpitations and leg swelling.   Gastrointestinal:  Positive for abdominal pain. Negative for constipation, diarrhea, nausea and vomiting.   Genitourinary:  Positive for dysuria. Negative for difficulty urinating and hematuria.   Musculoskeletal:  Negative for back pain.   Skin:  Negative for rash.   Neurological:  Negative for light-headedness and headaches.   Hematological:  Does not bruise/bleed easily.   Psychiatric/Behavioral:  Negative for agitation.    Objective:     Vital Signs (Most Recent):  Temp: 98.2 °F (36.8 °C) (02/11/23 1404)  Pulse: 106 (02/11/23 2204)  Resp: 17 (02/11/23 2204)  BP: 106/61 (02/11/23 2204)  SpO2: 97 % (02/11/23 2204) Vital Signs (24h Range):  Temp:  [98.2 °F (36.8 °C)] 98.2 °F (36.8 °C)  Pulse:  [106-110] 106  Resp:  [15-20] 17  SpO2:  [93 %-98 %] 97 %  BP: ()/(57-65) 106/61   Weight: 92.1 kg (203 lb)  Body mass index is 26.78 kg/m².      Intake/Output Summary (Last 24 hours) at 2/11/2023 2233  Last data filed at 2/11/2023 1404  Gross per 24 hour   Intake --   Output 450 ml   Net -450 ml       Physical Exam  Constitutional:        Appearance: He is ill-appearing.   HENT:      Head: Normocephalic and atraumatic.      Mouth/Throat:      Mouth: Mucous membranes are moist.      Pharynx: Oropharynx is clear.   Eyes:      General: Scleral icterus present.      Extraocular Movements: Extraocular movements intact.      Pupils: Pupils are equal, round, and reactive to light.   Cardiovascular:      Rate and Rhythm: Normal rate and regular rhythm.      Pulses: Normal pulses.      Heart sounds: Normal heart sounds.   Pulmonary:      Effort: Pulmonary effort is normal.      Breath sounds: Normal breath sounds.   Abdominal:      General: Abdomen is flat. There is distension.      Palpations: Abdomen is soft.      Tenderness: There is abdominal tenderness (diffuse, most significant in epigastric area).   Musculoskeletal:         General: Normal range of motion.      Cervical back: Normal range of motion and neck supple.      Right lower leg: No edema.      Left lower leg: No edema.   Skin:     General: Skin is warm and dry.      Coloration: Skin is jaundiced.   Neurological:      Mental Status: He is alert and oriented to person, place, and time.      Comments: Oriented X3 but intermittently confused about where he is and why he is in the hospital  Contracture of right arm, can't lift right leg    Psychiatric:         Mood and Affect: Mood normal.         Behavior: Behavior normal.       Vents:     Lines/Drains/Airways       Drain  Duration                  Urethral Catheter 02/11/23 1624 Straight-tip <1 day              Peripheral Intravenous Line  Duration                  Peripheral IV - Single Lumen 02/11/23 1528 20 G Left Shoulder <1 day                  Significant Labs:    CBC/Anemia Profile:  Recent Labs   Lab 02/11/23  1510 02/11/23  1531 02/11/23  2147   WBC 18.61*  --   --    HGB 11.6*  --   --    HCT 37.4* 41 37     --   --    MCV 69*  --   --    RDW 22.3*  --   --         Chemistries:  Recent Labs   Lab 02/11/23  1510   *   K  2.8*   CL 84*   CO2 24   BUN 10   CREATININE 0.6   CALCIUM 9.3   ALBUMIN 2.2*   PROT 7.2   BILITOT 29.3*   ALKPHOS 1,621*   *   *       All pertinent labs within the past 24 hours have been reviewed.    Significant Imaging: I have reviewed all pertinent imaging results/findings within the past 24 hours.

## 2023-02-12 NOTE — SUBJECTIVE & OBJECTIVE
Past Medical History:   Diagnosis Date    Alcohol abuse     Aphasia     COPD (chronic obstructive pulmonary disease)     GERD (gastroesophageal reflux disease)     Hypertension     Hypokalemia     Opioid abuse     Stroke     Unspecified glaucoma        History reviewed. No pertinent surgical history.    Review of patient's allergies indicates:  No Known Allergies  Family History    None       Tobacco Use    Smoking status: Never    Smokeless tobacco: Never   Substance and Sexual Activity    Alcohol use: Not on file    Drug use: Not Currently    Sexual activity: Not on file     Review of Systems   Constitutional:  Positive for fatigue. Negative for appetite change, chills and fever.   HENT:  Negative for congestion, sore throat and trouble swallowing.    Eyes:  Negative for photophobia, pain and discharge.   Respiratory:  Negative for cough and shortness of breath.    Cardiovascular:  Negative for chest pain and palpitations.   Gastrointestinal:  Positive for abdominal pain. Negative for diarrhea, nausea and vomiting.   Genitourinary:  Negative for difficulty urinating.   Musculoskeletal:  Negative for back pain, myalgias and neck pain.   Skin:  Positive for color change. Negative for pallor and rash.   Neurological:  Positive for weakness. Negative for light-headedness, numbness and headaches.   Objective:     Vital Signs (Most Recent):  Temp: 98.6 °F (37 °C) (02/12/23 0800)  Pulse: 108 (02/12/23 1000)  Resp: 14 (02/12/23 1000)  BP: (!) 90/57 (02/12/23 1000)  SpO2: (!) 94 % (02/12/23 1000)   Vital Signs (24h Range):  Temp:  [97.8 °F (36.6 °C)-98.6 °F (37 °C)] 98.6 °F (37 °C)  Pulse:  [] 108  Resp:  [13-20] 14  SpO2:  [91 %-100 %] 94 %  BP: ()/(50-71) 90/57     Weight: 92.1 kg (203 lb) (02/11/23 1404)  Body mass index is 26.78 kg/m².      Intake/Output Summary (Last 24 hours) at 2/12/2023 1015  Last data filed at 2/12/2023 0959  Gross per 24 hour   Intake 1944.8 ml   Output 2060 ml   Net -115.2 ml        Lines/Drains/Airways       Drain  Duration                  Urethral Catheter 02/11/23 1624 Straight-tip <1 day              Peripheral Intravenous Line  Duration                  Peripheral IV - Single Lumen 02/11/23 1528 20 G Left Shoulder <1 day         Peripheral IV - Single Lumen 02/12/23 0745 20 G Left;Anterior;Medial Antecubital <1 day                    Physical Exam  Constitutional:       Appearance: He is ill-appearing.   Eyes:      General: Scleral icterus present.      Conjunctiva/sclera: Conjunctivae normal.   Cardiovascular:      Rate and Rhythm: Normal rate and regular rhythm.      Pulses: Normal pulses.      Heart sounds: Normal heart sounds.   Pulmonary:      Effort: Pulmonary effort is normal. No respiratory distress.      Breath sounds: Normal breath sounds.   Abdominal:      General: Bowel sounds are normal. There is distension.      Palpations: Abdomen is soft. There is no fluid wave.      Tenderness: There is abdominal tenderness in the epigastric area.   Skin:     General: Skin is warm and dry.      Coloration: Skin is jaundiced.   Neurological:      Mental Status: He is alert and oriented to person, place, and time.       Significant Labs:  All pertinent lab results from the last 24 hours have been reviewed.    Significant Imaging:  Imaging results within the past 24 hours have been reviewed.

## 2023-02-12 NOTE — CONSULTS
[facility] - [facility]  Hematology/Oncology  Consult Note    Patient Name: Anthony Borges  MRN: [id number]  Admission Date: 2/11/2023  Hospital Length of Stay: 1 days  Code Status: Full Code   Attending Provider: Mando Elliott MD  Consulting Provider: Ling Gray DO  Primary Care Physician: Bean Pearce MD  Principal Problem:Pancreatic mass    Inpatient consult to Hematology/Oncology  Consult performed by: Ling Gray DO  Consult ordered by: Graeme Landers MD      Subjective:     HPI:  Mr. 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     Mr. Borges is oriented to self only and unable to provide history. He denies any complaints at time of interview. He presented to the ED last night with tachycardia, hypotension, bilirubin of 29, elevated LFTs, and WBC of 16. CT abd/pelvis was obtained and showed a large mass at the head of the pancreas with severe biliary ductal dilation. He also has prominent intra-abdominal lymph nodes and left lower lobe collapse with pleural effusion. CA 19-9 was >43811. He was admitted to the MICU for septic shock and is now on a low dose of norepinephrine.         Oncology Treatment Plan:   [No matching plan found]    Medications:  Continuous Infusions:   NORepinephrine bitartrate-D5W 0.03 mcg/kg/min (02/12/23 0959)     Scheduled Meds:   ascorbic acid (vitamin C)  500 mg Oral Daily    [START ON 2/13/2023] azithromycin  250 mg Oral Daily    brimonidine 0.2%  1 drop Both Eyes BID    [START ON 2/16/2023] ergocalciferol  50,000 Units Oral Every Thurs    famotidine  20 mg Oral Daily    heparin (porcine)  5,000 Units Subcutaneous Q8H    levETIRAcetam  500 mg Oral BID    piperacillin-tazobactam (ZOSYN) IVPB  4.5 g Intravenous Q8H    sodium phosphate IVPB  30 mmol Intravenous Once    vancomycin (VANCOCIN) IVPB  1,500 mg  Intravenous Q12H     PRN Meds:albuterol, aluminum-magnesium hydroxide-simethicone, dextrose 10%, dextrose 10%, glucagon (human recombinant), glucose, glucose, insulin aspart U-100, naloxone, sodium chloride 0.9%, Pharmacy to dose Vancomycin consult **AND** vancomycin - pharmacy to dose     Review of patient's allergies indicates:  No Known Allergies     Past Medical History:   Diagnosis Date    Alcohol abuse     Aphasia     COPD (chronic obstructive pulmonary disease)     GERD (gastroesophageal reflux disease)     Hypertension     Hypokalemia     Opioid abuse     Stroke     Unspecified glaucoma      History reviewed. No pertinent surgical history.  Family History    None       Tobacco Use    Smoking status: Never    Smokeless tobacco: Never   Substance and Sexual Activity    Alcohol use: Not on file    Drug use: Not Currently    Sexual activity: Not on file       Review of Systems   Unable to perform ROS: Mental status change   Objective:     Vital Signs (Most Recent):  Temp: 98.6 °F (37 °C) (02/12/23 0800)  Pulse: 108 (02/12/23 1015)  Resp: 14 (02/12/23 1015)  BP: (!) 89/57 (02/12/23 1015)  SpO2: (!) 94 % (02/12/23 1015)   Vital Signs (24h Range):  Temp:  [97.8 °F (36.6 °C)-98.6 °F (37 °C)] 98.6 °F (37 °C)  Pulse:  [] 108  Resp:  [13-20] 14  SpO2:  [91 %-100 %] 94 %  BP: ()/(50-71) 89/57     Weight: 92.1 kg (203 lb)  Body mass index is 26.78 kg/m².  Body surface area is 2.18 meters squared.      Intake/Output Summary (Last 24 hours) at 2/12/2023 1102  Last data filed at 2/12/2023 0959  Gross per 24 hour   Intake 1944.8 ml   Output 2060 ml   Net -115.2 ml       Physical Exam  Constitutional:       General: He is not in acute distress.     Appearance: He is not toxic-appearing.   HENT:      Head: Normocephalic and atraumatic.      Nose: Nose normal.      Mouth/Throat:      Pharynx: Oropharynx is clear.   Eyes:      General: Scleral icterus present.      Conjunctiva/sclera: Conjunctivae normal.    Cardiovascular:      Rate and Rhythm: Regular rhythm. Tachycardia present.      Heart sounds: No murmur heard.  Pulmonary:      Effort: Pulmonary effort is normal. No respiratory distress.      Breath sounds: Normal breath sounds. No wheezing.   Abdominal:      General: Bowel sounds are normal. There is distension.      Tenderness: There is no abdominal tenderness.   Musculoskeletal:         General: No tenderness or deformity.      Cervical back: Normal range of motion and neck supple.   Skin:     General: Skin is warm and dry.   Neurological:      Mental Status: He is alert. Mental status is at baseline.      Motor: Weakness present.       Significant Labs:   CBC:   Recent Labs   Lab 02/11/23  1510 02/11/23  1531 02/11/23  2147 02/12/23  0359   WBC 18.61*  --   --  30.27*   HGB 11.6*  --   --  10.4*   HCT 37.4* 41 37 32.2*     --   --  352    and CMP:   Recent Labs   Lab 02/11/23  1510 02/12/23  0537   * 127*   K 2.8* 4.3   CL 84* 90*   CO2 24 22*   * 103   BUN 10 8   CREATININE 0.6 0.6   CALCIUM 9.3 8.3*   PROT 7.2 6.1   ALBUMIN 2.2* 1.9*   BILITOT 29.3* 27.3*   ALKPHOS 1,621* 1,231*   * 357*   * 325*   ANIONGAP 16 15       Diagnostic Results:  I have reviewed all pertinent imaging results/findings within the past 24 hours.    Assessment/Plan:     * Pancreatic mass  Mr. Borges is a 62 year old male with multiple comorbid conditions, with residual right-sided weakness after CVA, who presents with new pancreatic mass causing biliary obstruction. He is now in the ICU with septic shock. There are prominent intra-abdominal lymph nodes concerning for metastatic disease, and his left lung is opacified on CT which also raises concern for lung involvement. CA 19-9 is >40870. Overall picture is concerning for likely metastatic pancreatic cancer, however would need biopsy to make diagnosis and comment on prognosis.     - agree with ERCP/EUS with biopsy if this aligns with goals of  care  - when septic shock has resolved, would pursue biopsy of distant site (intra-abdominal node vs lung if involvement seen on CT) to confirm metastatic disease  - unclear if he would be a candidate for palliative chemotherapy, however if he were to recover from current illness it could be considered  - we will follow up when biopsy has resulted      Thank you for your consult. I will follow-up with patient. Please contact us if you have any additional questions.    Ling Gray, DO  Hematology/Oncology  [facility] - [facility]

## 2023-02-12 NOTE — HPI
Mr. Anthony Borges is a 62 y.o. male with a PMHx of HTN, COPD, and CVA (residual RUE contracture and RLE weakness) who presented from a nursing home after the staff noticed he was jaundiced a day ago. Nursing staff also reports confusion and fatigue. HPI limited as patient is confused but he reports abdominal pain for the last 5 days and dysuria for the last 4 days. Denies any fevers, chills, chest pain, SOB, cough, nausea, diarrhea, or vomiting. He is prescribed an albuterol inhaler for COPD, denies sleeping with a bipap at night.     In the ED patient was afebrile, tachycardic to 110, BP 90/64, and was on RA. Labs significant for a WBC 16.1, Na 124, K 2.8, Alk phos 1600, Tbili 29, , , Ammonia 67. BNP, trop, and lacate x2 were normal. Initial ABG with a pH of 7.15 pCO2 of 76, repeat with a pH 7.54 and pCO2 of 33 without the use of bipap. Patient was given a dose of vanz/zosyn and was given 2.4L NS. CT head with no acute findings. CT abdomen with a large mass in the pancreatic head resulting in severe pancreatic ductal dilation and severe intra and extrahepatic biliary ductal dilation. Gen surg consulted and stated patient not a surgical candidate at this time. MICU consulted for sepsis and hyponatremia.

## 2023-02-12 NOTE — ASSESSMENT & PLAN NOTE
This patient does have evidence of infective focus. Leukocytosis to 18.61 on initial labs.  CXR with concerns for pleural effusion in lungs.  CT imaging with concerns for cholecystitis and  Hepatobiliary ductal dilations concerning for obstruction.     My overall impression is sepsis.  Source: Respiratory and Abdominal  Antibiotics given-   Antibiotics (72h ago, onward)    Start     Stop Route Frequency Ordered    02/12/23 0630  vancomycin 1.75 g in 5 % dextrose 500 mL IVPB         -- IV Every 12 hours (non-standard times) 02/12/23 0113    02/12/23 0200  piperacillin-tazobactam (ZOSYN) 4.5 g in dextrose 5 % in water (D5W) 5 % 100 mL IVPB (MB+)         -- IV Every 8 hours (non-standard times) 02/12/23 0058    02/12/23 0158  vancomycin - pharmacy to dose  (vancomycin IVPB)        See Hyperspace for full Linked Orders Report.    -- IV pharmacy to manage frequency 02/12/23 0058        Latest lactate reviewed-  Recent Labs   Lab 02/11/23  1520 02/11/23  1941   LACTATE 1.1 1.1     Organ dysfunction indicated by Acute liver injury and Encephalopathy    Fluid challenge:  Patient received IV fluid resuscitation in  ED for sepsis concerns    -General surgery was consulted and noted patient was not a candidate for intervention at this time.  -MICU consulted for sepsis /hyponatremia concerns and initial evaluation noted patient was stable for floor.    PLAN  - Continue broad-spectrum antibiotics with  Vancomycin and Zosyn  - Follow-up blood cultures and deescalate antibiotics as appropriate  - Follow-up right upper quadrant ultrasound to rule out acute cholecystitis  concerns  - Fluid resuscitation  as tolerated to target MAP>65.  Supplemental oxygen to target SpO2>90%, Telemetry, Strict I/Os  - Home antihypertensives held due to sepsis concerns, resume as tolerated

## 2023-02-12 NOTE — ASSESSMENT & PLAN NOTE
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

## 2023-02-12 NOTE — SUBJECTIVE & OBJECTIVE
Oncology Treatment Plan:   [No matching plan found]    Medications:  Continuous Infusions:   NORepinephrine bitartrate-D5W 0.03 mcg/kg/min (02/12/23 0959)     Scheduled Meds:   ascorbic acid (vitamin C)  500 mg Oral Daily    [START ON 2/13/2023] azithromycin  250 mg Oral Daily    brimonidine 0.2%  1 drop Both Eyes BID    [START ON 2/16/2023] ergocalciferol  50,000 Units Oral Every Thurs    famotidine  20 mg Oral Daily    heparin (porcine)  5,000 Units Subcutaneous Q8H    levETIRAcetam  500 mg Oral BID    piperacillin-tazobactam (ZOSYN) IVPB  4.5 g Intravenous Q8H    sodium phosphate IVPB  30 mmol Intravenous Once    vancomycin (VANCOCIN) IVPB  1,500 mg Intravenous Q12H     PRN Meds:albuterol, aluminum-magnesium hydroxide-simethicone, dextrose 10%, dextrose 10%, glucagon (human recombinant), glucose, glucose, insulin aspart U-100, naloxone, sodium chloride 0.9%, Pharmacy to dose Vancomycin consult **AND** vancomycin - pharmacy to dose     Review of patient's allergies indicates:  No Known Allergies     Past Medical History:   Diagnosis Date    Alcohol abuse     Aphasia     COPD (chronic obstructive pulmonary disease)     GERD (gastroesophageal reflux disease)     Hypertension     Hypokalemia     Opioid abuse     Stroke     Unspecified glaucoma      History reviewed. No pertinent surgical history.  Family History    None       Tobacco Use    Smoking status: Never    Smokeless tobacco: Never   Substance and Sexual Activity    Alcohol use: Not on file    Drug use: Not Currently    Sexual activity: Not on file       Review of Systems   Unable to perform ROS: Mental status change   Objective:     Vital Signs (Most Recent):  Temp: 98.6 °F (37 °C) (02/12/23 0800)  Pulse: 108 (02/12/23 1015)  Resp: 14 (02/12/23 1015)  BP: (!) 89/57 (02/12/23 1015)  SpO2: (!) 94 % (02/12/23 1015)   Vital Signs (24h Range):  Temp:  [97.8 °F (36.6 °C)-98.6 °F (37 °C)] 98.6 °F (37 °C)  Pulse:  [] 108  Resp:  [13-20] 14  SpO2:  [91 %-100 %]  94 %  BP: ()/(50-71) 89/57     Weight: 92.1 kg (203 lb)  Body mass index is 26.78 kg/m².  Body surface area is 2.18 meters squared.      Intake/Output Summary (Last 24 hours) at 2/12/2023 1102  Last data filed at 2/12/2023 0959  Gross per 24 hour   Intake 1944.8 ml   Output 2060 ml   Net -115.2 ml       Physical Exam  Constitutional:       General: He is not in acute distress.     Appearance: He is not toxic-appearing.   HENT:      Head: Normocephalic and atraumatic.      Nose: Nose normal.      Mouth/Throat:      Pharynx: Oropharynx is clear.   Eyes:      General: Scleral icterus present.      Conjunctiva/sclera: Conjunctivae normal.   Cardiovascular:      Rate and Rhythm: Regular rhythm. Tachycardia present.      Heart sounds: No murmur heard.  Pulmonary:      Effort: Pulmonary effort is normal. No respiratory distress.      Breath sounds: Normal breath sounds. No wheezing.   Abdominal:      General: Bowel sounds are normal. There is distension.      Tenderness: There is no abdominal tenderness.   Musculoskeletal:         General: No tenderness or deformity.      Cervical back: Normal range of motion and neck supple.   Skin:     General: Skin is warm and dry.   Neurological:      Mental Status: He is alert. Mental status is at baseline.      Motor: Weakness present.       Significant Labs:   CBC:   Recent Labs   Lab 02/11/23  1510 02/11/23  1531 02/11/23  2147 02/12/23  0359   WBC 18.61*  --   --  30.27*   HGB 11.6*  --   --  10.4*   HCT 37.4* 41 37 32.2*     --   --  352    and CMP:   Recent Labs   Lab 02/11/23  1510 02/12/23  0537   * 127*   K 2.8* 4.3   CL 84* 90*   CO2 24 22*   * 103   BUN 10 8   CREATININE 0.6 0.6   CALCIUM 9.3 8.3*   PROT 7.2 6.1   ALBUMIN 2.2* 1.9*   BILITOT 29.3* 27.3*   ALKPHOS 1,621* 1,231*   * 357*   * 325*   ANIONGAP 16 15       Diagnostic Results:  I have reviewed all pertinent imaging results/findings within the past 24 hours.

## 2023-02-12 NOTE — CONSULTS
[facility] - Emergency Dept  General Surgery  Consult Note    Patient Name: Anthony Borges  MRN: [id number]  Code Status: Prior  Admission Date: 2/11/2023  Hospital Length of Stay: 0 days  Attending Physician: No att. providers found  Primary Care Provider: Bean Pearce MD    Patient information was obtained from patient, past medical records and ER records.     Inpatient consult to General Surgery  Consult performed by: REYMUNDO Shine MD  Consult ordered by: Miguel Angel Fernandes Jr., MD  Reason for consult: cholecystitis in pt with newly diagnosed pancreatic cancer        Subjective:     Principal Problem: <principal problem not specified>    History of Present Illness: Mr. Borges is a 62 yo M PMH COPD, CVA with residual R sided hemiplegia, opioid and alcohol abuse, venous thromboembolism with IVC filter presents with jaundice. He lives in NH.  Found to have 3 cm pancreatic head mass with compression of the portal vein and marked dilation of pancreatic duct and biliary tree. There are satellite lesions in the tail of the pancreas and posterior to the head of the pancreas. There is root of mesentery lymphadenopathy, possibly malignant. There is left lower lobe collapse, possibly from metastatic disease to the lung.    He is AF, mildly tachycardic.    Tbili is 29  Ca 19-9 12,099  Alk phos 1600  WBC 18        No current facility-administered medications on file prior to encounter.     Current Outpatient Medications on File Prior to Encounter   Medication Sig    acetaminophen (TYLENOL) 325 MG tablet Take 325 mg by mouth every 4 (four) hours as needed (headache/ fever).    albuterol (PROAIR HFA) 90 mcg/actuation inhaler Inhale 2 puffs into the lungs every 4 (four) hours as needed for Wheezing. Rescue    aluminum-magnesium hydroxide-simethicone (MAALOX) 200-200-20 mg/5 mL Susp Take 30 mLs by mouth every 4 (four) hours as needed (dyspepsia).    ascorbic acid, vitamin C, (VITAMIN C) 500 MG tablet Take 500 mg by mouth once  daily.    atorvastatin (LIPITOR) 20 MG tablet Take 20 mg by mouth every evening.    bisacodyL (DULCOLAX) 10 mg Supp Place 10 mg rectally daily as needed (constipation).    brimonidine 0.2% (ALPHAGAN) 0.2 % Drop Place 1 drop into both eyes 2 (two) times a day.    bumetanide (BUMEX) 2 MG tablet Take 2 mg by mouth once daily.    ergocalciferol (ERGOCALCIFEROL) 50,000 unit Cap Take 50,000 Units by mouth every Thursday.    famotidine (PEPCID) 20 MG tablet Take 20 mg by mouth once daily.    guaiFENesin (MUCINEX) 600 mg 12 hr tablet Take 600 mg by mouth 2 (two) times daily.    levETIRAcetam (KEPPRA) 500 MG Tab Take 500 mg by mouth 2 (two) times daily.    metoclopramide HCl (REGLAN) 5 mg/5 mL Soln Take 5 mg by mouth every 8 (eight) hours as needed (ileus issues).    metoprolol tartrate (LOPRESSOR) 25 MG tablet Take 25 mg by mouth 2 (two) times daily.    multivitamin (ONE DAILY MULTIVITAMIN) per tablet Take 1 tablet by mouth once daily.    phenylephrine/diphenhydramine (DIPHENHYDRAMINE-PHENYLEPHRINE ORAL) Take 10 mLs by mouth 3 (three) times daily as needed (cough).    potassium chloride SA (K-DUR,KLOR-CON) 20 MEQ tablet Take 20 mEq by mouth 2 (two) times daily.    senna (SENOKOT) 8.6 mg tablet Take 2 tablets by mouth daily as needed for Constipation.    travoprost, benzalkonium, (TRAVATAN) 0.004 % ophthalmic solution Place 1 drop into both eyes every evening.       Review of patient's allergies indicates:  No Known Allergies    Past Medical History:   Diagnosis Date    Alcohol abuse     Aphasia     COPD (chronic obstructive pulmonary disease)     GERD (gastroesophageal reflux disease)     Hypertension     Hypokalemia     Opioid abuse     Stroke     Unspecified glaucoma      History reviewed. No pertinent surgical history.  Family History    None       Tobacco Use    Smoking status: Never    Smokeless tobacco: Never   Substance and Sexual Activity    Alcohol use: Not on file    Drug use: Not  Currently    Sexual activity: Not on file     Review of Systems   Constitutional: Negative.  Negative for activity change, chills and fever.   HENT: Negative.     Eyes: Negative.    Respiratory: Negative.  Negative for cough and shortness of breath.    Cardiovascular: Negative.  Negative for chest pain and palpitations.   Gastrointestinal:  Positive for abdominal pain. Negative for abdominal distention, constipation, diarrhea, nausea and vomiting.   Endocrine: Negative.    Genitourinary: Negative.  Negative for dysuria.   Musculoskeletal: Negative.    Skin:  Positive for color change.   Allergic/Immunologic: Negative.    Neurological: Negative.    Hematological: Negative.    Psychiatric/Behavioral: Negative.     Objective:     Vital Signs (Most Recent):  Temp: 98.2 °F (36.8 °C) (02/11/23 1404)  Pulse: 107 (02/11/23 2234)  Resp: 19 (02/11/23 2234)  BP: (!) 91/56 (02/11/23 2234)  SpO2: 98 % (02/11/23 2234)   Vital Signs (24h Range):  Temp:  [98.2 °F (36.8 °C)] 98.2 °F (36.8 °C)  Pulse:  [106-110] 107  Resp:  [15-20] 19  SpO2:  [93 %-98 %] 98 %  BP: ()/(56-65) 91/56     Weight: 92.1 kg (203 lb)  Body mass index is 26.78 kg/m².    Physical Exam  Constitutional:       Comments: Marked jaundice & scleral icterus   HENT:      Head: Normocephalic.   Cardiovascular:      Rate and Rhythm: Normal rate.   Pulmonary:      Effort: Pulmonary effort is normal. No respiratory distress.   Abdominal:      General: Abdomen is flat. There is no distension.      Hernia: No hernia is present.      Comments: RUQ tenderness, mild     Musculoskeletal:         General: No swelling.   Skin:     General: Skin is warm and dry.      Capillary Refill: Capillary refill takes less than 2 seconds.      Coloration: Skin is jaundiced.   Neurological:      Mental Status: He is alert.      Comments: Right sided weakness from stroke   Psychiatric:         Mood and Affect: Mood normal.       Significant Labs:  I have reviewed all pertinent lab  results within the past 24 hours.  CBC:   Recent Labs   Lab 02/11/23  1510 02/11/23  1531 02/11/23  2147   WBC 18.61*  --   --    RBC 5.41  --   --    HGB 11.6*  --   --    HCT 37.4*   < > 37     --   --    MCV 69*  --   --    MCH 21.4*  --   --    MCHC 31.0*  --   --     < > = values in this interval not displayed.     CMP:   Recent Labs   Lab 02/11/23  1510   *   CALCIUM 9.3   ALBUMIN 2.2*   PROT 7.2   *   K 2.8*   CO2 24   CL 84*   BUN 10   CREATININE 0.6   ALKPHOS 1,621*   *   *   BILITOT 29.3*       Significant Diagnostics:  I have reviewed all pertinent imaging results/findings within the past 24 hours.    CT reviewed      Assessment/Plan:     Pancreatic mass  62M with pancreatic head mass with lesions suspicious for metastatic disease. Gen surg consulted for cholecystitis. The inflammation of the gallbladder is likely from biliary obstruction from the pancreatic mass. No operative intervention for gallbladder.    In regards to his pancreatic cancer, he seems to have grossly metastatic disease. Additionally, he is bedbound and lives in a nursing home and has history of COPD, CVA with residual right sided weakness, and VTE s/p IVC filter. He would not tolerate a Whipple even if he proves to not have metastatic disease, which is unlikely.    Recommendations:  Admit to medicine  AES consult for biliary stent placement and EUS biopsy  Heme-onc consult  Continue abx    I have discussed the above with the patient. He has limited understanding.    Surgery will sign off.      VTE Risk Mitigation (From admission, onward)    None          Thank you for your consult. I will sign off. Please contact us if you have any additional questions.    JAIRON Shine MD  General Surgery  [facility] - Emergency Dept

## 2023-02-12 NOTE — ASSESSMENT & PLAN NOTE
Hyponatremic on presentation with Na 124  Baseline ~ 140  Also has low chloride  Pt oriented to person, place, and month but does appear somewhat confused; unclear if baseline given h/o CVA or 2/2 likely locally metastatic disease, infection, and elevated ammonia, etc vs hyponatremia   Hyponatremia possibly 2/2 malnutrition, poor PO intake in setting of abdominal pain  Could also be pseudohyponatremia in setting of jaundice   Na improved to 127 on blood gas after administration of IV fluids    Plan:  - follow-up urine Na, serum osm, urine osm  - monitor Na Q6h  - hold off on further methods to correct as Pt has already increased Na by 3 points in 3 hours; goal to increase sodium by 6-8 mEq/L/day in 24-hour period

## 2023-02-12 NOTE — ASSESSMENT & PLAN NOTE
On albuterol PRN outpatient   No signs of wheezing or COPD exacerbation   Initial ABG with a pH of 7.15 and CO2 76, repeat pH 7.54 and pCO2 33 without bipap  Saturating > 92% on RA    Recommend:  - PRN albuterol

## 2023-02-12 NOTE — ASSESSMENT & PLAN NOTE
Ammonia elevated 67 on admission  Could have element of liver disease and hepatic encephalopathy contributing to altered mentation     Plan:  - lactulose 20g TID  - adjust dose to achieve 2 to 3 soft stools/day

## 2023-02-12 NOTE — SUBJECTIVE & OBJECTIVE
No current facility-administered medications on file prior to encounter.     Current Outpatient Medications on File Prior to Encounter   Medication Sig    acetaminophen (TYLENOL) 325 MG tablet Take 325 mg by mouth every 4 (four) hours as needed (headache/ fever).    albuterol (PROAIR HFA) 90 mcg/actuation inhaler Inhale 2 puffs into the lungs every 4 (four) hours as needed for Wheezing. Rescue    aluminum-magnesium hydroxide-simethicone (MAALOX) 200-200-20 mg/5 mL Susp Take 30 mLs by mouth every 4 (four) hours as needed (dyspepsia).    ascorbic acid, vitamin C, (VITAMIN C) 500 MG tablet Take 500 mg by mouth once daily.    atorvastatin (LIPITOR) 20 MG tablet Take 20 mg by mouth every evening.    bisacodyL (DULCOLAX) 10 mg Supp Place 10 mg rectally daily as needed (constipation).    brimonidine 0.2% (ALPHAGAN) 0.2 % Drop Place 1 drop into both eyes 2 (two) times a day.    bumetanide (BUMEX) 2 MG tablet Take 2 mg by mouth once daily.    ergocalciferol (ERGOCALCIFEROL) 50,000 unit Cap Take 50,000 Units by mouth every Thursday.    famotidine (PEPCID) 20 MG tablet Take 20 mg by mouth once daily.    guaiFENesin (MUCINEX) 600 mg 12 hr tablet Take 600 mg by mouth 2 (two) times daily.    levETIRAcetam (KEPPRA) 500 MG Tab Take 500 mg by mouth 2 (two) times daily.    metoclopramide HCl (REGLAN) 5 mg/5 mL Soln Take 5 mg by mouth every 8 (eight) hours as needed (ileus issues).    metoprolol tartrate (LOPRESSOR) 25 MG tablet Take 25 mg by mouth 2 (two) times daily.    multivitamin (ONE DAILY MULTIVITAMIN) per tablet Take 1 tablet by mouth once daily.    phenylephrine/diphenhydramine (DIPHENHYDRAMINE-PHENYLEPHRINE ORAL) Take 10 mLs by mouth 3 (three) times daily as needed (cough).    potassium chloride SA (K-DUR,KLOR-CON) 20 MEQ tablet Take 20 mEq by mouth 2 (two) times daily.    senna (SENOKOT) 8.6 mg tablet Take 2 tablets by mouth daily as needed for Constipation.    travoprost, benzalkonium, (TRAVATAN) 0.004 % ophthalmic  solution Place 1 drop into both eyes every evening.       Review of patient's allergies indicates:  No Known Allergies    Past Medical History:   Diagnosis Date    Alcohol abuse     Aphasia     COPD (chronic obstructive pulmonary disease)     GERD (gastroesophageal reflux disease)     Hypertension     Hypokalemia     Opioid abuse     Stroke     Unspecified glaucoma      History reviewed. No pertinent surgical history.  Family History    None       Tobacco Use    Smoking status: Never    Smokeless tobacco: Never   Substance and Sexual Activity    Alcohol use: Not on file    Drug use: Not Currently    Sexual activity: Not on file     Review of Systems   Constitutional: Negative.  Negative for activity change, chills and fever.   HENT: Negative.     Eyes: Negative.    Respiratory: Negative.  Negative for cough and shortness of breath.    Cardiovascular: Negative.  Negative for chest pain and palpitations.   Gastrointestinal:  Positive for abdominal pain. Negative for abdominal distention, constipation, diarrhea, nausea and vomiting.   Endocrine: Negative.    Genitourinary: Negative.  Negative for dysuria.   Musculoskeletal: Negative.    Skin:  Positive for color change.   Allergic/Immunologic: Negative.    Neurological: Negative.    Hematological: Negative.    Psychiatric/Behavioral: Negative.     Objective:     Vital Signs (Most Recent):  Temp: 98.2 °F (36.8 °C) (02/11/23 1404)  Pulse: 107 (02/11/23 2234)  Resp: 19 (02/11/23 2234)  BP: (!) 91/56 (02/11/23 2234)  SpO2: 98 % (02/11/23 2234)   Vital Signs (24h Range):  Temp:  [98.2 °F (36.8 °C)] 98.2 °F (36.8 °C)  Pulse:  [106-110] 107  Resp:  [15-20] 19  SpO2:  [93 %-98 %] 98 %  BP: ()/(56-65) 91/56     Weight: 92.1 kg (203 lb)  Body mass index is 26.78 kg/m².    Physical Exam  Constitutional:       Comments: Marked jaundice & scleral icterus   HENT:      Head: Normocephalic.   Cardiovascular:      Rate and Rhythm: Normal rate.   Pulmonary:      Effort: Pulmonary  effort is normal. No respiratory distress.   Abdominal:      General: Abdomen is flat. There is no distension.      Hernia: No hernia is present.      Comments: RUQ tenderness, mild     Musculoskeletal:         General: No swelling.   Skin:     General: Skin is warm and dry.      Capillary Refill: Capillary refill takes less than 2 seconds.      Coloration: Skin is jaundiced.   Neurological:      Mental Status: He is alert.      Comments: Right sided weakness from stroke   Psychiatric:         Mood and Affect: Mood normal.       Significant Labs:  I have reviewed all pertinent lab results within the past 24 hours.  CBC:   Recent Labs   Lab 02/11/23  1510 02/11/23  1531 02/11/23  2147   WBC 18.61*  --   --    RBC 5.41  --   --    HGB 11.6*  --   --    HCT 37.4*   < > 37     --   --    MCV 69*  --   --    MCH 21.4*  --   --    MCHC 31.0*  --   --     < > = values in this interval not displayed.     CMP:   Recent Labs   Lab 02/11/23  1510   *   CALCIUM 9.3   ALBUMIN 2.2*   PROT 7.2   *   K 2.8*   CO2 24   CL 84*   BUN 10   CREATININE 0.6   ALKPHOS 1,621*   *   *   BILITOT 29.3*       Significant Diagnostics:  I have reviewed all pertinent imaging results/findings within the past 24 hours.    CT reviewed

## 2023-02-12 NOTE — SUBJECTIVE & OBJECTIVE
Past Medical History:   Diagnosis Date    Alcohol abuse     Aphasia     COPD (chronic obstructive pulmonary disease)     GERD (gastroesophageal reflux disease)     Hypertension     Hypokalemia     Opioid abuse     Stroke     Unspecified glaucoma      History reviewed. No pertinent surgical history.    Review of patient's allergies indicates:  No Known Allergies    Family History    None       Tobacco Use    Smoking status: Never    Smokeless tobacco: Never   Substance and Sexual Activity    Alcohol use: Not on file    Drug use: Not Currently    Sexual activity: Not on file      Review of Systems   Unable to perform ROS: Acuity of condition   Gastrointestinal:  Positive for abdominal pain.   Genitourinary:  Positive for dysuria.   Objective:     Vital Signs (Most Recent):  Temp: 98.3 °F (36.8 °C) (02/12/23 0436)  Pulse: 101 (02/12/23 0442)  Resp: 16 (02/12/23 0442)  BP: (!) 82/53 (02/12/23 0442)  SpO2: 98 % (02/12/23 0442)   Vital Signs (24h Range):  Temp:  [98.2 °F (36.8 °C)-98.3 °F (36.8 °C)] 98.3 °F (36.8 °C)  Pulse:  [100-110] 101  Resp:  [14-20] 16  SpO2:  [91 %-100 %] 98 %  BP: ()/(51-71) 82/53   Weight: 92.1 kg (203 lb)  Body mass index is 26.78 kg/m².      Intake/Output Summary (Last 24 hours) at 2/12/2023 0448  Last data filed at 2/12/2023 0422  Gross per 24 hour   Intake --   Output 1450 ml   Net -1450 ml       Physical Exam  Vitals and nursing note reviewed.   Constitutional:       General: He is not in acute distress.     Appearance: He is ill-appearing.   HENT:      Head: Normocephalic and atraumatic.   Eyes:      General: Scleral icterus present.      Extraocular Movements: Extraocular movements intact.      Pupils: Pupils are equal, round, and reactive to light.   Cardiovascular:      Rate and Rhythm: Regular rhythm. Tachycardia present.      Pulses: Normal pulses.      Heart sounds: No murmur heard.  Pulmonary:      Effort: Pulmonary effort is normal.      Breath sounds: Rales (bases) present.  No wheezing or rhonchi.      Comments: On non-rebreather 15L satting 100%, weaned to 10L and able to maintain sats   Abdominal:      General: Bowel sounds are normal. There is no distension.      Palpations: Abdomen is soft.      Tenderness: There is abdominal tenderness. There is no guarding.   Musculoskeletal:         General: No tenderness.      Right lower leg: No edema.      Left lower leg: No edema.   Skin:     Coloration: Skin is jaundiced.   Neurological:      Mental Status: He is alert.     Vents:     Lines/Drains/Airways       Drain  Duration                  Urethral Catheter 02/11/23 1624 Straight-tip <1 day              Peripheral Intravenous Line  Duration                  Peripheral IV - Single Lumen 02/11/23 1528 20 G Left Shoulder <1 day         Peripheral IV - Single Lumen 02/12/23 0432 20 G Distal;Left Upper Arm <1 day                  Significant Labs:    CBC/Anemia Profile:  Recent Labs   Lab 02/11/23  1510 02/11/23  1531 02/11/23  2147   WBC 18.61*  --   --    HGB 11.6*  --   --    HCT 37.4* 41 37     --   --    MCV 69*  --   --    RDW 22.3*  --   --         Chemistries:  Recent Labs   Lab 02/11/23  1510   *   K 2.8*   CL 84*   CO2 24   BUN 10   CREATININE 0.6   CALCIUM 9.3   ALBUMIN 2.2*   PROT 7.2   BILITOT 29.3*   ALKPHOS 1,621*   *   *       Blood Culture:   Recent Labs   Lab 02/11/23  1519   LABBLOO No Growth to date  No Growth to date     CMP:   Recent Labs   Lab 02/11/23  1510   *   K 2.8*   CL 84*   CO2 24   *   BUN 10   CREATININE 0.6   CALCIUM 9.3   PROT 7.2   ALBUMIN 2.2*   BILITOT 29.3*   ALKPHOS 1,621*   *   *   ANIONGAP 16     Lactic Acid:   Recent Labs   Lab 02/11/23  1520 02/11/23  1941   LACTATE 1.1 1.1     Urine Culture: No results for input(s): LABURIN in the last 48 hours.  Recent Lab Results  (Last 5 results in the past 24 hours)        02/12/23  0429   02/12/23  0359   02/12/23  0238   02/12/23  0237   02/11/23  2149         Allens Test         Pass       Site         LR       DelSys         Room Air       FiO2         21       Mode         SPONT       Osmolality   273             POC BE         6       POC Glucose     113           POC HCO3         28.5       POC Hematocrit         37       POC Ionized Calcium         0.96       POC PCO2         33.1       POC PH         7.544       POC PO2         63       POC Potassium         2.3       POC SATURATED O2         95       POC Sodium         127       POC TCO2         30       POCT Glucose 97       113         Sample         ARTERIAL                            All pertinent labs within the past 24 hours have been reviewed.    Significant Imaging: I have reviewed all pertinent imaging results/findings within the past 24 hours.

## 2023-02-12 NOTE — ASSESSMENT & PLAN NOTE
Recent Labs     02/11/23  1510   *       -Patient noted to have serum sodium of 124 on admission consistent with hyponatremia. Baseline Na is usually around 140 per chart review. Suspect secondary to dehydration vs secondary to liver failure .  -S/p IV fluid resuscitation in ED for sepsis concerns     PLAN  - BMP q6h  to follow serial sodium levels and do not want to quickly increase sodium level more than 6-8 mEq/L/day in 24-hour period   - Followup urine lytes, urine osm, serum osm

## 2023-02-12 NOTE — CONSULTS
[facility] - Emergency Dept  Critical Care Medicine  Consult Note    Patient Name: Anthony Borges  MRN: [id number]  Admission Date: 2/11/2023  Hospital Length of Stay: 0 days  Code Status: Prior  Attending Physician: No att. providers found   Primary Care Provider: Bean Pearce MD   Principal Problem: <principal problem not specified>    Inpatient consult to Critical Care Medicine  Consult performed by: Khadra Cavanaugh MD  Consult ordered by: MiguelA ngel Fernandes Jr., MD        Subjective:     HPI:  Mr. Anthony Borges is a 62 y.o. male with a PMHx of HTN, COPD, and CVA (residual RUE contracture and RLE weakness) who presented from a nursing home after the staff noticed he was jaundiced a day ago. Nursing staff also reports confusion and fatigue. HPI limited as patient is confused but he reports abdominal pain for the last 5 days and dysuria for the last 4 days. Denies any fevers, chills, chest pain, SOB, cough, nausea, diarrhea, or vomiting. He is prescribed an albuterol inhaler for COPD, denies sleeping with a bipap at night.     In the ED patient was afebrile, tachycardic to 110, BP 90/64, and was on RA. Labs significant for a WBC 16.1, Na 124, K 2.8, Alk phos 1600, Tbili 29, , , Ammonia 67. BNP, trop, and lacate x2 were normal. Initial ABG with a pH of 7.15 pCO2 of 76, repeat with a pH 7.54 and pCO2 of 33 without the use of bipap. Patient was given a dose of vanz/zosyn and was given 2.4L NS. CT head with no acute findings. CT abdomen with a large mass in the pancreatic head resulting in severe pancreatic ductal dilation and severe intra and extrahepatic biliary ductal dilation. Gen surg consulted and stated patient not a surgical candidate at this time. MICU consulted for sepsis and hyponatremia.    Past Medical History:   Diagnosis Date    Alcohol abuse     Aphasia     COPD (chronic obstructive pulmonary disease)     GERD (gastroesophageal reflux disease)     Hypertension     Hypokalemia     Opioid abuse      Stroke     Unspecified glaucoma        History reviewed. No pertinent surgical history.    Review of patient's allergies indicates:  No Known Allergies    Family History    None       Tobacco Use    Smoking status: Never    Smokeless tobacco: Never   Substance and Sexual Activity    Alcohol use: Not on file    Drug use: Not Currently    Sexual activity: Not on file      Review of Systems   Constitutional:  Positive for activity change. Negative for chills and fever.   HENT:  Negative for congestion and sore throat.    Eyes:  Negative for pain.   Respiratory:  Negative for cough and shortness of breath.    Cardiovascular:  Negative for chest pain, palpitations and leg swelling.   Gastrointestinal:  Positive for abdominal pain. Negative for constipation, diarrhea, nausea and vomiting.   Genitourinary:  Positive for dysuria. Negative for difficulty urinating and hematuria.   Musculoskeletal:  Negative for back pain.   Skin:  Negative for rash.   Neurological:  Negative for light-headedness and headaches.   Hematological:  Does not bruise/bleed easily.   Psychiatric/Behavioral:  Negative for agitation.    Objective:     Vital Signs (Most Recent):  Temp: 98.2 °F (36.8 °C) (02/11/23 1404)  Pulse: 106 (02/11/23 2204)  Resp: 17 (02/11/23 2204)  BP: 106/61 (02/11/23 2204)  SpO2: 97 % (02/11/23 2204) Vital Signs (24h Range):  Temp:  [98.2 °F (36.8 °C)] 98.2 °F (36.8 °C)  Pulse:  [106-110] 106  Resp:  [15-20] 17  SpO2:  [93 %-98 %] 97 %  BP: ()/(57-65) 106/61   Weight: 92.1 kg (203 lb)  Body mass index is 26.78 kg/m².      Intake/Output Summary (Last 24 hours) at 2/11/2023 2233  Last data filed at 2/11/2023 1404  Gross per 24 hour   Intake --   Output 450 ml   Net -450 ml       Physical Exam  Constitutional:       Appearance: He is ill-appearing.   HENT:      Head: Normocephalic and atraumatic.      Mouth/Throat:      Mouth: Mucous membranes are moist.      Pharynx: Oropharynx is clear.   Eyes:      General: Scleral  icterus present.      Extraocular Movements: Extraocular movements intact.      Pupils: Pupils are equal, round, and reactive to light.   Cardiovascular:      Rate and Rhythm: Normal rate and regular rhythm.      Pulses: Normal pulses.      Heart sounds: Normal heart sounds.   Pulmonary:      Effort: Pulmonary effort is normal.      Breath sounds: Normal breath sounds.   Abdominal:      General: Abdomen is flat. There is distension.      Palpations: Abdomen is soft.      Tenderness: There is abdominal tenderness (diffuse, most significant in epigastric area).   Musculoskeletal:         General: Normal range of motion.      Cervical back: Normal range of motion and neck supple.      Right lower leg: No edema.      Left lower leg: No edema.   Skin:     General: Skin is warm and dry.      Coloration: Skin is jaundiced.   Neurological:      Mental Status: He is alert and oriented to person, place, and time.      Comments: Oriented X3 but intermittently confused about where he is and why he is in the hospital  Contracture of right arm, can't lift right leg    Psychiatric:         Mood and Affect: Mood normal.         Behavior: Behavior normal.       Vents:     Lines/Drains/Airways       Drain  Duration                  Urethral Catheter 02/11/23 1624 Straight-tip <1 day              Peripheral Intravenous Line  Duration                  Peripheral IV - Single Lumen 02/11/23 1528 20 G Left Shoulder <1 day                  Significant Labs:    CBC/Anemia Profile:  Recent Labs   Lab 02/11/23  1510 02/11/23  1531 02/11/23  2147   WBC 18.61*  --   --    HGB 11.6*  --   --    HCT 37.4* 41 37     --   --    MCV 69*  --   --    RDW 22.3*  --   --         Chemistries:  Recent Labs   Lab 02/11/23  1510   *   K 2.8*   CL 84*   CO2 24   BUN 10   CREATININE 0.6   CALCIUM 9.3   ALBUMIN 2.2*   PROT 7.2   BILITOT 29.3*   ALKPHOS 1,621*   *   *       All pertinent labs within the past 24 hours have been  reviewed.    Significant Imaging: I have reviewed all pertinent imaging results/findings within the past 24 hours.      ABG  Recent Labs   Lab 02/11/23  2147   PH 7.544*   PO2 63*   PCO2 33.1*   HCO3 28.5*   BE 6     Assessment/Plan:     Pulmonary  Chronic obstructive lung disease  On albuterol PRN outpatient   No signs of wheezing or COPD exacerbation   Initial ABG with a pH of 7.15 and CO2 76, repeat pH 7.54 and pCO2 33 without bipap  Saturating > 92% on RA    Recommend:  - PRN albuterol     Renal/  Hyponatremia  Hyponatremic on presentation with Na 124  Also has low chloride  Pt oriented to person, place, and month but does appear somewhat confused; unclear if baseline given h/o CVA or 2/2 likely locally metastatic disease, infection, and elevated ammonia, etc vs hyponatremia   Hyponatremia possibly 2/2 malnutrition, poor PO intake in setting of abdominal pain  Na improved to 127 on blood gas after administration of IV fluids    Recommend:  - hyponatremia workup with urine Na, serum osm, urine osm  - monitor Na Q8h  - hold off on further methods to correct as Pt has already increased Na by 3 points in 3 hours    ID  Sepsis  Meets sepsis criteria on presentation with leukocytosis and tachycardia  Pt reports abdominal pain and dysuria for the past 4-5 days  Some concern for possible cholecystitis on CT abdo  Source could be intra-abdominal vs urinary although UA not concerning for infection  CXR showing left sided atelectasis and pleural effusion; Pt denies respiratory sx making resp infection less likely; Pt also saturating well on RA  HDS stable, BP improved after administration of IV fluids     Recommend:  - continue abx  - f/u BCx, UCx  - consider additional IV fluid as Pt responded well to the 2.5L he's received so far  - US abdomen to assess for cholecystitis    GI  Pancreatic mass  Here from nursing home with jaundice   On admission Tbili 29, Alk phos 1600, , Alt 393  CT revealed large mass in the  pancreatic head, severe dilation of pancreatic duct and intra/extra hepatic biliary ducts  Gen surge stated patient not a surgical candidate     Recommend:  - NPO   - U/S to evaluate for cholecystitis   - GI consult for possible MRCP/ERCP   - Analgesia           Pt stable for the floor. MICU to sign off. Please contact MICU for any questions/concerns or if Pt's condition deteriorates     Critical care was time spent personally by me on the following activities: development of treatment plan with patient or surrogate and bedside caregivers, discussions with consultants, evaluation of patient's response to treatment, examination of patient, ordering and performing treatments and interventions, ordering and review of laboratory studies, ordering and review of radiographic studies, pulse oximetry, re-evaluation of patient's condition. This critical care time did not overlap with that of any other provider or involve time for any procedures.    Thank you for your consult. I will sign off. Please contact us if you have any additional questions.     Khadra Cavanaugh MD  Critical Care Medicine  [facility] - Emergency Dept

## 2023-02-12 NOTE — ASSESSMENT & PLAN NOTE
Presented from nursing home due to jaundice and abdominal pain  On admission Tbili 29, Alk phos 1600, , Alt 393  CT abdomen/pelvis from 2/11 showing large mass in the pancreatic head, severe dilation of pancreatic duct and intra/extra hepatic biliary ducts as well as prominent lymph nodes concerning for metastatic disease  CA-A 19-9 ordered by ED and found to be elevated [id number]  Seen by gen surg who advised Pt is not a surgical candidate and is unlikely to tolerate a Whipple procedure    Plan:  - NPO   - U/S to evaluate for cholecystitis   - AES consult for possible MRCP/ERCP   - heme/onc consult   - analgesia prn  - hold home atorvastatin 2/2 transaminitis  - trend LFTs

## 2023-02-12 NOTE — ASSESSMENT & PLAN NOTE
Mr. Borges is a 62 year old male with multiple comorbid conditions, with residual right-sided weakness after CVA, who presents with new pancreatic mass causing biliary obstruction. He is now in the ICU with septic shock. There are prominent intra-abdominal lymph nodes concerning for metastatic disease, and his left lung is opacified on CT which also raises concern for lung involvement. CA 19-9 is >96018. Overall picture is concerning for likely metastatic pancreatic cancer, however would need biopsy to make diagnosis and comment on prognosis.     - agree with ERCP/EUS with biopsy if this aligns with goals of care  - when septic shock has resolved, would pursue biopsy of distant site (intra-abdominal node vs lung if involvement seen on CT) to confirm metastatic disease  - we will follow up when biopsy has resulted

## 2023-02-12 NOTE — PROGRESS NOTES
Pharmacokinetic Initial Assessment: IV Vancomycin    Assessment/Plan:    Initiate intravenous vancomycin with loading dose of 1750 mg once, done in ED, followed by a maintenance dose of vancomycin 1750 mg IV every 12 hours.  Desired empiric serum trough concentration is 15 to 20 mcg/mL.  Draw vancomycin trough level 60 min prior to fourth dose on 02/13/2023 at 0530.  Pharmacy will continue to follow and monitor vancomycin.      Please contact pharmacy at extension [phone] with any questions regarding this assessment.     Thank you for the consult,   Jess Chaidez       Patient brief summary:  Anthony Borges is a 62 y.o. male initiated on antimicrobial therapy with IV Vancomycin for treatment of suspected bacteremia.    Drug Allergies:   Review of patient's allergies indicates:  No Known Allergies    Actual Body Weight:   92.1 kg    Renal Function:   Estimated Creatinine Clearance: 146.1 mL/min (based on SCr of 0.6 mg/dL).    CBC (last 72 hours):  Recent Labs   Lab Result Units 02/11/23  1510   WBC K/uL 18.61*   Hemoglobin g/dL 11.6*   Hematocrit % 37.4*   Platelets K/uL 376   Gran % % 84.0*   Lymph % % 6.0*   Mono % % 9.1   Eosinophil % % 0.1   Basophil % % 0.2   Differential Method  Automated       Metabolic Panel (last 72 hours):  Recent Labs   Lab Result Units 02/11/23  1510 02/11/23  1631   Sodium mmol/L 124*  --    Potassium mmol/L 2.8*  --    Chloride mmol/L 84*  --    CO2 mmol/L 24  --    Glucose mg/dL 135*  --    Glucose, UA   --  Negative   BUN mg/dL 10  --    Creatinine mg/dL 0.6  --    Albumin g/dL 2.2*  --    Total Bilirubin mg/dL 29.3*  --    Alkaline Phosphatase U/L 1,621*  --    AST U/L 448*  --    ALT U/L 393*  --        Drug levels (last 3 results):  No results for input(s): VANCOMYCINRA, VANCORANDOM, VANCOMYCINPE, VANCOPEAK, VANCOMYCINTR, VANCOTROUGH in the last 72 hours.    Microbiologic Results:  Microbiology Results (last 7 days)       Procedure Component Value Units Date/Time    Blood  culture #1 **CANNOT BE ORDERED STAT** [[id number]] Collected: 02/11/23 1519    Order Status: Sent Specimen: Blood from Peripheral, Hand, Left Updated: 02/11/23 1528    Blood culture #2 **CANNOT BE ORDERED STAT** [[id number]] Collected: 02/11/23 1519    Order Status: Sent Specimen: Blood from Peripheral, Upper Arm, Left Updated: 02/11/23 1528

## 2023-02-12 NOTE — ASSESSMENT & PLAN NOTE
Recommend:  - continue abx  - consider additional IV fluid as Pt responded well to the 2.5L he's received so far  - US abdomen to assess for cholecystitis

## 2023-02-12 NOTE — ASSESSMENT & PLAN NOTE
On albuterol PRN outpatient   No signs of wheezing or COPD exacerbation   Initial ABG with a pH of 7.15 and CO2 76, repeat pH 7.54 and pCO2 33 without bipap  Saturating > 92% on RA    Plan:  - PRN albuterol

## 2023-02-12 NOTE — H&P
[facility] - Emergency Dept  [facility] Medicine  History & Physical    Patient Name: Anthony Borges  MRN: [id number]  Patient Class: IP- Inpatient  Admission Date: 2/11/2023  Attending Physician: Amie Murphy MD   Primary Care Provider: Bean Pearce MD         Patient information was obtained from patient, past medical records and ER records.     Subjective:     Principal Problem:Pancreatic mass    Chief Complaint:   Chief Complaint   Patient presents with    Abnormal Chest X-ray    Jaundice        HPI: Patient information was obtained from past medical records and ER records  as upon initial evaluation, patient was disoriented(disoriented to person, place, time and situation).    Mr. 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     ED course notable for  initial vitals were afebrile, hypotensive,  tachycardic to 110.  Labs notable for WBC 18.61, sodium 124, potassium 2.8, alkaline phosphatase 1621, albumin 2.2, total bilirubin 29.3, ,   , ammonia 67,  CA 19-9 82411.1.  Imaging  notable for chest x-ray with concerns for left lung atelectasis and pleural effusion,  CT head nonacute,  CT abdomen pelvis with concerns for large pancreatic head mass with severe pancreatic ductal dilation and intra/extrahepatic biliary ductal dilation.  Multiple prominent lymph nodes concerning for metastatic disease, cholelithiasis and mild pericholecystic fluid concerning for acute cholecystitis.  General surgery was consulted and noted patient was not a candidate for intervention at this time.  MICU consulted for sepsis /hyponatremia concerns  and initial evaluation noted patient was stable for floor.      Past Medical History:   Diagnosis Date    Alcohol abuse     Aphasia     COPD (chronic obstructive pulmonary disease)     GERD  (gastroesophageal reflux disease)     Hypertension     Hypokalemia     Opioid abuse     Stroke     Unspecified glaucoma        History reviewed. No pertinent surgical history.    Review of patient's allergies indicates:  No Known Allergies    No current facility-administered medications on file prior to encounter.     Current Outpatient Medications on File Prior to Encounter   Medication Sig    acetaminophen (TYLENOL) 325 MG tablet Take 325 mg by mouth every 4 (four) hours as needed (headache/ fever).    albuterol (PROAIR HFA) 90 mcg/actuation inhaler Inhale 2 puffs into the lungs every 4 (four) hours as needed for Wheezing. Rescue    aluminum-magnesium hydroxide-simethicone (MAALOX) 200-200-20 mg/5 mL Susp Take 30 mLs by mouth every 4 (four) hours as needed (dyspepsia).    ascorbic acid, vitamin C, (VITAMIN C) 500 MG tablet Take 500 mg by mouth once daily.    atorvastatin (LIPITOR) 20 MG tablet Take 20 mg by mouth every evening.    bisacodyL (DULCOLAX) 10 mg Supp Place 10 mg rectally daily as needed (constipation).    brimonidine 0.2% (ALPHAGAN) 0.2 % Drop Place 1 drop into both eyes 2 (two) times a day.    bumetanide (BUMEX) 2 MG tablet Take 2 mg by mouth once daily.    ergocalciferol (ERGOCALCIFEROL) 50,000 unit Cap Take 50,000 Units by mouth every Thursday.    famotidine (PEPCID) 20 MG tablet Take 20 mg by mouth once daily.    guaiFENesin (MUCINEX) 600 mg 12 hr tablet Take 600 mg by mouth 2 (two) times daily.    levETIRAcetam (KEPPRA) 500 MG Tab Take 500 mg by mouth 2 (two) times daily.    metoclopramide HCl (REGLAN) 5 mg/5 mL Soln Take 5 mg by mouth every 8 (eight) hours as needed (ileus issues).    metoprolol tartrate (LOPRESSOR) 25 MG tablet Take 25 mg by mouth 2 (two) times daily.    multivitamin (ONE DAILY MULTIVITAMIN) per tablet Take 1 tablet by mouth once daily.    phenylephrine/diphenhydramine (DIPHENHYDRAMINE-PHENYLEPHRINE ORAL) Take 10 mLs by mouth 3 (three) times daily as needed (cough).    potassium  chloride SA (K-DUR,KLOR-CON) 20 MEQ tablet Take 20 mEq by mouth 2 (two) times daily.    senna (SENOKOT) 8.6 mg tablet Take 2 tablets by mouth daily as needed for Constipation.    travoprost, benzalkonium, (TRAVATAN) 0.004 % ophthalmic solution Place 1 drop into both eyes every evening.     Family History    None       Tobacco Use    Smoking status: Never    Smokeless tobacco: Never   Substance and Sexual Activity    Alcohol use: Not on file    Drug use: Not Currently    Sexual activity: Not on file     Review of Systems   Unable to perform ROS: Mental status change   Objective:     Vital Signs (Most Recent):  Temp: 98.2 °F (36.8 °C) (02/11/23 1404)  Pulse: 104 (02/12/23 0000)  Resp: 16 (02/12/23 0000)  BP: (!) 91/56 (02/12/23 0105)  SpO2: 98 % (02/12/23 0000)   Vital Signs (24h Range):  Temp:  [98.2 °F (36.8 °C)] 98.2 °F (36.8 °C)  Pulse:  [104-110] 104  Resp:  [15-20] 16  SpO2:  [93 %-98 %] 98 %  BP: ()/(56-71) 91/56     Weight: 92.1 kg (203 lb)  Body mass index is 26.78 kg/m².    Physical Exam  Vitals and nursing note reviewed.   Constitutional:       General: He is not in acute distress.     Appearance: He is ill-appearing. He is not toxic-appearing or diaphoretic.      Interventions: Nasal cannula in place.   HENT:      Head: Normocephalic and atraumatic.      Mouth/Throat:      Mouth: Mucous membranes are moist.   Eyes:      General: Scleral icterus present.         Right eye: No discharge.         Left eye: No discharge.   Cardiovascular:      Rate and Rhythm: Regular rhythm. Tachycardia present.      Heart sounds: Normal heart sounds.   Pulmonary:      Effort: Pulmonary effort is normal. No respiratory distress.   Abdominal:      General: Bowel sounds are normal. There is no distension.      Palpations: Abdomen is soft.      Tenderness: There is abdominal tenderness.   Genitourinary:     Comments: Roca in place draining orange-colored urine  Musculoskeletal:         General: Deformity (right-sided  contractures) present.      Right lower leg: Edema present.      Left lower leg: Edema present.   Skin:     General: Skin is warm and dry.      Coloration: Skin is jaundiced.   Neurological:      Mental Status: He is disoriented.      Motor: Weakness (right-sided>left-sided) present.      Comments: disoriented to person, place, time and situation      Psychiatric:         Mood and Affect: Mood normal.         Behavior: Behavior normal.           Significant Labs: All pertinent labs within the past 24 hours have been reviewed.  Bilirubin:   Recent Labs   Lab 02/11/23  1510   BILITOT 29.3*     CBC:   Recent Labs   Lab 02/11/23  1510 02/11/23  1531 02/11/23  2147   WBC 18.61*  --   --    HGB 11.6*  --   --    HCT 37.4* 41 37     --   --      CMP:   Recent Labs   Lab 02/11/23  1510   *   K 2.8*   CL 84*   CO2 24   *   BUN 10   CREATININE 0.6   CALCIUM 9.3   PROT 7.2   ALBUMIN 2.2*   BILITOT 29.3*   ALKPHOS 1,621*   *   *   ANIONGAP 16     Significant Imaging: I have reviewed all pertinent imaging results/findings within the past 24 hours.    Imaging Results              CT Abdomen Pelvis With Contrast (Final result)  Result time 02/11/23 20:31:36      Final result by Himanshu Rosario DO (02/11/23 20:31:36)                   Impression:      1. Large mass in the pancreatic head resulting in severe pancreatic ductal dilation and severe intra and extrahepatic biliary ductal dilation.  Findings are highly concerning for malignancy and tissue sampling is recommended.  Abutment and mild narrowing of the portal confluence.  2. Additional pancreatic or peripancreatic lesions as above.  3. Multiple mildly prominent lymph nodes at the root of the mesentery with associated mesenteric fat stranding.  While this may be seen with mesenteric panniculitis, metastatic disease is not excluded, recommend attention on follow-up imaging.  4. Suspected complete collapse of the left lower lobe.  Recommend  further evaluation with CT chest to exclude metastatic disease.  5. Cholelithiasis and mild pericholecystic fluid, likely reactive.  If there is concern for acute cholecystitis, further evaluation with right upper quadrant ultrasound is recommended.  6. Small benign fat containing lesion in the left adrenal gland compatible with a myelolipoma or adenoma.      Electronically signed by: Himanshu Rosario  Date:    02/11/2023  Time:    20:31               Narrative:    EXAMINATION:  CT ABDOMEN PELVIS WITH CONTRAST    CLINICAL HISTORY:  Abdominal pain, acute, nonlocalized;    TECHNIQUE:  Axial CT images with sagittal and coronal reformats were obtained of the abdomen and pelvis from the hemidiaphragms through the symphysis pubis after the administration of 100mL Omnipaque 350.    COMPARISON:  None available.    FINDINGS:  Examination is limited due to streak artifact from the right arm patient's arm which is within the field of view.    Lung Bases: There is complete opacification of the partially imaged left lower lobe with leftward mediastinal shift suspicious for left lower lobe collapse.  Opacification of the left lower lobe bronchi noted.  There is a small left pleural effusion.  Mild heterogeneous opacities also noted in the lingula.  The right lung base is grossly clear.    Heart: The heart is mildly enlarged.  No pericardial effusion.    Liver: The liver is normal in size and demonstrates homogeneous enhancement without focal lesion.  The portal vasculature is patent.    Biliary tract: There is severe intra and extrahepatic biliary ductal dilation.    Gallbladder: The gallbladder is distended.  There is mild pericholecystic fluid which is likely reactive.  There are several small layering gallstones.    Pancreas: There is a 2.3 x 3.2 cm pancreatic head mass with resultant severe dilation the pancreatic duct.  The duct measures up to approximately 1.4 cm when measured in the region of the pancreatic neck.  There is an  additional hypoattenuating focus in or adjacent to the posterior aspect of the pancreatic head measuring 1.5 cm (series 2, image 69).  There is atrophy of the body and tail of the pancreas.  There is a 1.6 cm lesion in or adjacent to the pancreatic tail (series 2, image 39).    Spleen: Normal size without focal lesion.  There is a surgical clip at the splenic hilum.    Adrenals: There is a 1.1 cm fat containing lesion in the adrenal gland compatible with a small myelolipoma or adenoma.    Kidneys and urinary collecting systems: Too small to characterize hypodensity in the left kidney noted.  Otherwise the kidneys are normal.  No hydronephrosis or urolithiasis.    Lymph nodes: There are several mildly prominent nodes at the root of the mesentery with associated mesenteric stranding, findings which can be seen with mesenteric panniculitis alternatively, metastatic adenopathy cannot be entirely excluded.    Stomach and bowel: The stomach is normal.  There is a large amount of stool with distention of the cecum.  No evidence of volvulus, large bowel obstruction, wall thickening, or pneumatosis.  The small bowel is normal in caliber without bowel obstruction.  The appendix is normal.    Peritoneum and mesentery: No ascites or free intraperitoneal air.  No abdominal fluid collection.    Vasculature: There is abutment and mild narrowing of the portal confluence.  No abutment of the SMA.  There is an IVC filter in place.  Minimal atherosclerosis.  No aneurysm the    Urinary bladder: Contracted around a Roca catheter.  There is air in the bladder lumen, expected post instrumentation.  There is bladder wall thickening, likely due to contracted status.    Reproductive organs: The prostate is normal in size.    Body wall: There is a fat containing umbilical hernia.    Musculoskeletal: There is heterogeneity of the right proximal femur with a suspected ghost tract from a prior intramedullary nail, and high-grade heterotopic  ossification of the right proximal femur, correlate for history of recent trauma or surgery.  There are postoperative changes at the left sacroiliac joint.  There are remote fractures of the bilateral inferior and superior pubic rami.  There are multiple remote healed posterior left-sided rib fractures.  There are degenerative changes of the left hip and lumbar spine.  There is no evidence of an aggressive osseous lesion to suggest osseous metastatic disease.                                       CT Head Without Contrast (Final result)  Result time 02/11/23 19:53:35      Final result by Chris Cordova MD (02/11/23 19:53:35)                   Impression:      No detrimental change, acute large vascular territory infarct or intracranial hemorrhage identified.  If persistent neurologic deficit, MRI brain can be obtained.    Grossly stable sequela of chronic microvascular ischemic change, senescent change and remote infarcts involving the left temporoparietal region and left thalamus.    Stable asymmetry of the lateral ventricles, suggesting ex vacuo dilatation.    Slightly improved sinus disease compared to 05/12/2022, as above.      Electronically signed by: Chris Cordova MD  Date:    02/11/2023  Time:    19:53               Narrative:    EXAMINATION:  CT HEAD WITHOUT CONTRAST    CLINICAL HISTORY:  altered mental status;    TECHNIQUE:  Low dose axial CT images obtained throughout the head without intravenous contrast. Sagittal and coronal reconstructions were performed.    COMPARISON:  Head CT 05/12/2022    FINDINGS:  Intracranial compartment:    Generalized cerebral volume loss.  Patchy hypoattenuation of the subcortical and periventricular white matter, left more than right, likely sequela of chronic microvascular ischemic change.  There is remote infarct involving the left thalamus.  Encephalomalacia within the left temporal and parietal lobes, noting scattered punctate foci of calcification, stable.  No  extra-axial blood or fluid collections.  The ventricles appear stable without distortion by mass effect or acute hydrocephalus noting similar ex vacuo dilatation of the left lateral ventricle and cavum septum pellucidum.    No definite new focal areas of abnormal parenchymal attenuation.  No parenchymal mass, hemorrhage, edema or acute major vascular distribution infarct.  Skull base atherosclerotic vascular calcifications noted.    Skull/extracranial contents (limited evaluation): No fracture. Patchy mucosal thickening of the paranasal sinuses with air-fluid levels in the bilateral sphenoid sinuses, slightly improved from 05/12/2022 study.  Right mastoid air cells are clear.  Similar minimal partial opacification of left mastoid air cells.  Imaged portions of the orbits are within normal limits.                                       X-Ray Chest 1 View (Final result)  Result time 02/11/23 16:57:08      Final result by Tara Dwyer MD (02/11/23 16:57:08)                   Impression:      Today's findings are suggestive of a combination of left lung atelectasis causing shift of the central structures and pleural effusion.      Electronically signed by: Tara Dwyer MD  Date:    02/11/2023  Time:    16:57               Narrative:    EXAMINATION:  XR CHEST 1 VIEW    CLINICAL HISTORY:  Shortness of breath;    TECHNIQUE:  Single frontal view of the chest was performed.    COMPARISON:  05/13/2022    FINDINGS:  The lungs are symmetrically hypoinflated.  Although the patient is rotated on this exam.  There may be a component of volume loss secondary to atelectasis causing a shift of the mediastinum and cardiac silhouette to the left.  There is dense airspace opacification which extends to the left lung apex.  The right lung is free of abnormality.                                       X-Ray Chest PA And Lateral (In process)                       Assessment/Plan:     * Pancreatic mass  CT abdomen pelvis[02/11/23]  "with concerns for large pancreatic head mass with severe pancreatic ductal dilation and intra/extrahepatic biliary ductal dilation.  Multiple prominent lymph nodes concerning for metastatic disease. Initial labs with CA 19-9(29104.1) and hepatobiliary enzymes elevation(, , TBili 29.3). Patient with jaundice and abdominal pain on initial physical exam .    -General surgery was consulted and noted gallbladder inflammation likely from biliary obstruction secondary to pancreatic mass.  Also noted that given his co-morbidities he would be a poor surgical candidate to tolerate a Whipple procedure.    PLAN  -Gastroenterology(AES) consulted.   -Heme-Onc consulted  -Home atorvastatin held due to transaminitis concerns    Cholelithiasis  -CT abdomen and pelvis[02/11/23] noted concerns for Cholelithiasis and mild pericholecystic fluid, likely reactive  and recommended further evaluation with right upper quadrant ultrasound if concerns for acute cholecystitis    -General surgery was consulted and noted gallbladder inflammation likely from biliary obstruction secondary to pancreatic mass.  Also noted that given his co-morbidities he would be a poor surgical candidate to tolerate a Whipple procedure.    PLAN  Followup right upper quadrant ultrasound  See "Pancreatic mass" A&P    Hyponatremia  Recent Labs     02/11/23  1510   *       -Patient noted to have serum sodium of 124 on admission consistent with hyponatremia. Baseline Na is usually around 140 per chart review. Suspect secondary to dehydration vs secondary to liver failure .  -S/p IV fluid resuscitation in ED for sepsis concerns     PLAN  - BMP q6h  to follow serial sodium levels and do not want to quickly increase sodium level more than 6-8 mEq/L/day in 24-hour period   - Followup urine lytes, urine osm, serum osm     Sepsis  This patient does have evidence of infective focus. Leukocytosis to 18.61 on initial labs.  CXR with concerns for pleural " effusion in lungs.  CT imaging with concerns for cholecystitis and  Hepatobiliary ductal dilations concerning for obstruction.     My overall impression is sepsis.  Source: Respiratory and Abdominal  Antibiotics given-   Antibiotics (72h ago, onward)      Start     Stop Route Frequency Ordered    02/12/23 0630  vancomycin 1.75 g in 5 % dextrose 500 mL IVPB         -- IV Every 12 hours (non-standard times) 02/12/23 0113    02/12/23 0200  piperacillin-tazobactam (ZOSYN) 4.5 g in dextrose 5 % in water (D5W) 5 % 100 mL IVPB (MB+)         -- IV Every 8 hours (non-standard times) 02/12/23 0058    02/12/23 0158  vancomycin - pharmacy to dose  (vancomycin IVPB)        See [facility] for full Linked Orders Report.    -- IV pharmacy to manage frequency 02/12/23 0058          Latest lactate reviewed-  Recent Labs   Lab 02/11/23  1520 02/11/23  1941   LACTATE 1.1 1.1     Organ dysfunction indicated by Acute liver injury and Encephalopathy    Fluid challenge:  Patient received IV fluid resuscitation in  ED for sepsis concerns    -General surgery was consulted and noted patient was not a candidate for intervention at this time.  -MICU consulted for sepsis /hyponatremia concerns and initial evaluation noted patient was stable for floor.    PLAN  - Continue broad-spectrum antibiotics with  Vancomycin and Zosyn  - Follow-up blood cultures and deescalate antibiotics as appropriate  - Follow-up right upper quadrant ultrasound to rule out acute cholecystitis  concerns  - Fluid resuscitation  as tolerated to target MAP>65.  Supplemental oxygen to target SpO2>90%, Telemetry, Strict I/Os  - Home antihypertensives held due to sepsis concerns, resume as tolerated    Altered mental state  -Upon initial evaluation, patient was disoriented x4(disoriented to person, place, time and situation). CT head nonacute.  He was able to tolerate nursing bedside swallow assessment.  -Differential diagnoses include, but not limited to: sepsis vs.  "Hyponatremia vs. hyperammonemia     PLAN  -Follow-up SLP consult  -Delirium precautions  - See "Sepsis, hyponatremia, and hyperammonemia" A&P      Hyperammonemia  Ammonia elevated to 67 on admission.  Given elevated hepatobiliary enzymes, differential includes hepatic encephalopathy possibly contributing to patient's altered mental status.    PLAN  Lactulose 20g BID. Adjust dose to achieve 2 to 3 soft stools/day         Elevated CA 19-9 level  See "Pancreatic mass" A&P    Alkaline phosphatase elevation  See "Pancreatic mass" A&P    Transaminitis  See "Pancreatic mass" A&P    History of CVA (cerebrovascular accident)  -History of 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.    PLAN  - Pressure ulcer precautions per nursing      Chronic obstructive lung disease  Stable.  VBG on admission without hypercapnia.    PLAN   Continue home albuterol p.r.n.    VTE Risk Mitigation (From admission, onward)           Ordered     IP VTE LOW RISK PATIENT  Once         02/12/23 0110     Place sequential compression device  Until discontinued         02/12/23 0110                       Cipriano Ho DO  Department of Hospital Medicine   [facility] - Emergency Dept    Note was created using voice recognition software. It may have occasional typographical errors not identified and edited despite initial review prior to signing.  "

## 2023-02-12 NOTE — ASSESSMENT & PLAN NOTE
Meets sepsis criteria on presentation with leukocytosis and tachycardia  Pt reports abdominal pain and dysuria for the past 4-5 days  Some concern for possible cholecystitis on CT abdo  Source could be intra-abdominal vs urinary although UA not concerning for infection  CXR showing left sided atelectasis and pleural effusion; Pt denies respiratory sx making resp infection less likely; Pt also saturating well on RA    Recommend:  - continue abx  - f/u BCx, UCx  - consider additional IV fluid as Pt responded well to the 2.5L he's received so far  - US abdomen to assess for cholecystitis

## 2023-02-12 NOTE — HPI
Mr. Anthony Borges is a 62 year old male for whom AES is consulted for evaluation of pancreatic mass. He has a PMH significant for reported COPD (no PFT's on file) and CVA (with residual right sided deficits and not on antiplatelet therapy).     Hospital Course: Patient presented to [facility] on 02/11 from nursing home with unspecified duration of confusion and nursing staff noticing new onset jaundice. On arrival, vital signs notable for hypotension (SBP 80-90's). Labs notable for leukocytosis (22), hyponatremia (124), hypokalemia (2.8), transaminitis (, ), hyperbilirubinemia (22), and normal lipase. CT A/P showed a 2.3 X 3.2 cm mass in the pancreatic head with severe dilation of the pancreatic duct and intra and extra-hepatic biliary ductal dilatation; liver was normal in size and gallbladder was distended with several small gallstones; prominent mesenteric lymph nodes also noted. He was evaluated by general surgery with regards to gallbladder abnormalities on CT and this was attributed to a reactive change from associated biliary dilatation. He was admitted to MICU and initiated on broad spectrum antibiotics and vasopressor support. AES consulted.     On initial bedside interview, patient was awake, alert, and oriented to person and place. He reports onset of intermittent epigastric pain beginning on approximately 02/04/2023. He reports symptom association with progressive skin and eye yellowing, but denies associated pain with PO intake, pale colored stools, and prior episodes of pancreatitis. He does reports a prior history of tobacco abuse.

## 2023-02-12 NOTE — HPI
Patient information was obtained from past medical records and ER records  as upon initial evaluation, patient was disoriented(disoriented to person, place, time and situation).    Mr. 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     ED course notable for  initial vitals were afebrile, hypotensive,  tachycardic to 110.  Labs notable for WBC 18.61, sodium 124, potassium 2.8, alkaline phosphatase 1621, albumin 2.2, total bilirubin 29.3, ,   , ammonia 67,  CA 19-9 43736.1.  Imaging  notable for chest x-ray with concerns for left lung atelectasis and pleural effusion,  CT head nonacute,  CT abdomen pelvis with concerns for large pancreatic head mass with severe pancreatic ductal dilation and intra/extrahepatic biliary ductal dilation.  Multiple prominent lymph nodes concerning for metastatic disease, cholelithiasis and mild pericholecystic fluid concerning for acute cholecystitis.  General surgery was consulted and noted patient was not a candidate for intervention at this time.  MICU consulted for sepsis /hyponatremia concerns  and initial evaluation noted patient was stable for floor.

## 2023-02-12 NOTE — ACP (ADVANCE CARE PLANNING)
Plan of Care      I discussed Mr. Borges current situation with his sister Dalia who is his healthcare POA. We discussed that Mr. Borges has a pancreatic mass causing an obstruction resulting in cholangitis. He also has signs on this CT of metastatic pancreatic disease. The patient lives in a nursing home at baseline and is bed bound. Surgery evaluated and the pt is not a candidate for surgery. Per his sister, the family has discussed if he were to become sick that they would not want invasive interventions that would not result in improvement in quality of life. They are okay with ERCP/AES to relieve obstruction. They would not want CPR, intubation or to initiate dialysis. She is going to speak to her family but does not know if something like palliative chemo would be something they are interested in unless it will result in both increased time and quality of life. She believes nursing home with hospice is likely what they will want but would like to see how he does over the next few days and to discuss with her family. She is currently in [place] and is unable to get here until after bladimir diego. But she is available by phone to assist with decision making.    - DNR/DNI filed.     Daksha Hunter MD  Emergency Medicine Staff   Critical Care Fellow  2:17 PM

## 2023-02-12 NOTE — ASSESSMENT & PLAN NOTE
Here from nursing home with jaundice   On admission Tbili 29, Alk phos 1600, , Alt 393  CT revealed large mass in the pancreatic head, severe dilation of pancreatic duct and intra/extra hepatic biliary ducts  Gen surge stated patient not a surgical candidate     Recommend:  - NPO   - U/S to evaluate for cholecystitis   - GI consult for possible MRCP/ERCP   - Analgesia

## 2023-02-12 NOTE — ED NOTES
Received pt disoriented x4 and in NAD.  Pt breathing E/U on 2L NC.  Call bell in reach with bed rails up x2.  Pt placed on continuous cardiac, O2, and BP monitoring.  Pt and family advised of plan of care to include all test and procedures. Patient stable at this time; will continue with plan of care.

## 2023-02-12 NOTE — ASSESSMENT & PLAN NOTE
Hyponatremic on presentation with Na 124  Also has low chloride  Pt oriented to person, place, and month but does appear somewhat confused; unclear if baseline given h/o CVA or 2/2 likely locally metastatic disease, infection, and elevated ammonia, etc vs hyponatremia   Hyponatremia possibly 2/2 malnutrition, poor PO intake in setting of abdominal pain  Na improved to 127 on blood gas after administration of IV fluids    Recommend:  - hyponatremia workup with urine Na, serum osm, urine osm  - monitor Na Q8h  - hold off on further methods to correct as Pt has already increased Na by 3 points in 3 hours

## 2023-02-12 NOTE — ASSESSMENT & PLAN NOTE
-History of 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.    PLAN  - Pressure ulcer precautions per nursing

## 2023-02-12 NOTE — SUBJECTIVE & OBJECTIVE
Past Medical History:   Diagnosis Date    Alcohol abuse     Aphasia     COPD (chronic obstructive pulmonary disease)     GERD (gastroesophageal reflux disease)     Hypertension     Hypokalemia     Opioid abuse     Stroke     Unspecified glaucoma        History reviewed. No pertinent surgical history.    Review of patient's allergies indicates:  No Known Allergies    No current facility-administered medications on file prior to encounter.     Current Outpatient Medications on File Prior to Encounter   Medication Sig    acetaminophen (TYLENOL) 325 MG tablet Take 325 mg by mouth every 4 (four) hours as needed (headache/ fever).    albuterol (PROAIR HFA) 90 mcg/actuation inhaler Inhale 2 puffs into the lungs every 4 (four) hours as needed for Wheezing. Rescue    aluminum-magnesium hydroxide-simethicone (MAALOX) 200-200-20 mg/5 mL Susp Take 30 mLs by mouth every 4 (four) hours as needed (dyspepsia).    ascorbic acid, vitamin C, (VITAMIN C) 500 MG tablet Take 500 mg by mouth once daily.    atorvastatin (LIPITOR) 20 MG tablet Take 20 mg by mouth every evening.    bisacodyL (DULCOLAX) 10 mg Supp Place 10 mg rectally daily as needed (constipation).    brimonidine 0.2% (ALPHAGAN) 0.2 % Drop Place 1 drop into both eyes 2 (two) times a day.    bumetanide (BUMEX) 2 MG tablet Take 2 mg by mouth once daily.    ergocalciferol (ERGOCALCIFEROL) 50,000 unit Cap Take 50,000 Units by mouth every Thursday.    famotidine (PEPCID) 20 MG tablet Take 20 mg by mouth once daily.    guaiFENesin (MUCINEX) 600 mg 12 hr tablet Take 600 mg by mouth 2 (two) times daily.    levETIRAcetam (KEPPRA) 500 MG Tab Take 500 mg by mouth 2 (two) times daily.    metoclopramide HCl (REGLAN) 5 mg/5 mL Soln Take 5 mg by mouth every 8 (eight) hours as needed (ileus issues).    metoprolol tartrate (LOPRESSOR) 25 MG tablet Take 25 mg by mouth 2 (two) times daily.    multivitamin (ONE DAILY MULTIVITAMIN) per tablet Take 1 tablet by mouth once daily.     phenylephrine/diphenhydramine (DIPHENHYDRAMINE-PHENYLEPHRINE ORAL) Take 10 mLs by mouth 3 (three) times daily as needed (cough).    potassium chloride SA (K-DUR,KLOR-CON) 20 MEQ tablet Take 20 mEq by mouth 2 (two) times daily.    senna (SENOKOT) 8.6 mg tablet Take 2 tablets by mouth daily as needed for Constipation.    travoprost, benzalkonium, (TRAVATAN) 0.004 % ophthalmic solution Place 1 drop into both eyes every evening.     Family History    None       Tobacco Use    Smoking status: Never    Smokeless tobacco: Never   Substance and Sexual Activity    Alcohol use: Not on file    Drug use: Not Currently    Sexual activity: Not on file     Review of Systems   Unable to perform ROS: Mental status change   Objective:     Vital Signs (Most Recent):  Temp: 98.2 °F (36.8 °C) (02/11/23 1404)  Pulse: 104 (02/12/23 0000)  Resp: 16 (02/12/23 0000)  BP: (!) 91/56 (02/12/23 0105)  SpO2: 98 % (02/12/23 0000)   Vital Signs (24h Range):  Temp:  [98.2 °F (36.8 °C)] 98.2 °F (36.8 °C)  Pulse:  [104-110] 104  Resp:  [15-20] 16  SpO2:  [93 %-98 %] 98 %  BP: ()/(56-71) 91/56     Weight: 92.1 kg (203 lb)  Body mass index is 26.78 kg/m².    Physical Exam  Vitals and nursing note reviewed.   Constitutional:       General: He is not in acute distress.     Appearance: He is ill-appearing. He is not toxic-appearing or diaphoretic.      Interventions: Nasal cannula in place.   HENT:      Head: Normocephalic and atraumatic.      Mouth/Throat:      Mouth: Mucous membranes are moist.   Eyes:      General: Scleral icterus present.         Right eye: No discharge.         Left eye: No discharge.   Cardiovascular:      Rate and Rhythm: Regular rhythm. Tachycardia present.      Heart sounds: Normal heart sounds.   Pulmonary:      Effort: Pulmonary effort is normal. No respiratory distress.   Abdominal:      General: Bowel sounds are normal. There is no distension.      Palpations: Abdomen is soft.      Tenderness: There is abdominal  tenderness.   Genitourinary:     Comments: Roca in place draining orange-colored urine  Musculoskeletal:         General: Deformity (right-sided contractures) present.      Right lower leg: Edema present.      Left lower leg: Edema present.   Skin:     General: Skin is warm and dry.      Coloration: Skin is jaundiced.   Neurological:      Mental Status: He is alert. Mental status is at baseline. He is disoriented.      Motor: Weakness (right-sided) present.      Comments: disoriented to person, place, time and situation      Psychiatric:         Mood and Affect: Mood normal.         Behavior: Behavior normal.           Significant Labs: All pertinent labs within the past 24 hours have been reviewed.  Bilirubin:   Recent Labs   Lab 02/11/23  1510   BILITOT 29.3*     CBC:   Recent Labs   Lab 02/11/23  1510 02/11/23  1531 02/11/23  2147   WBC 18.61*  --   --    HGB 11.6*  --   --    HCT 37.4* 41 37     --   --      CMP:   Recent Labs   Lab 02/11/23  1510   *   K 2.8*   CL 84*   CO2 24   *   BUN 10   CREATININE 0.6   CALCIUM 9.3   PROT 7.2   ALBUMIN 2.2*   BILITOT 29.3*   ALKPHOS 1,621*   *   *   ANIONGAP 16     Significant Imaging: I have reviewed all pertinent imaging results/findings within the past 24 hours.    Imaging Results              CT Abdomen Pelvis With Contrast (Final result)  Result time 02/11/23 20:31:36      Final result by Himanshu Rosario DO (02/11/23 20:31:36)                   Impression:      1. Large mass in the pancreatic head resulting in severe pancreatic ductal dilation and severe intra and extrahepatic biliary ductal dilation.  Findings are highly concerning for malignancy and tissue sampling is recommended.  Abutment and mild narrowing of the portal confluence.  2. Additional pancreatic or peripancreatic lesions as above.  3. Multiple mildly prominent lymph nodes at the root of the mesentery with associated mesenteric fat stranding.  While this may be seen  with mesenteric panniculitis, metastatic disease is not excluded, recommend attention on follow-up imaging.  4. Suspected complete collapse of the left lower lobe.  Recommend further evaluation with CT chest to exclude metastatic disease.  5. Cholelithiasis and mild pericholecystic fluid, likely reactive.  If there is concern for acute cholecystitis, further evaluation with right upper quadrant ultrasound is recommended.  6. Small benign fat containing lesion in the left adrenal gland compatible with a myelolipoma or adenoma.      Electronically signed by: Himanshu Rosario  Date:    02/11/2023  Time:    20:31               Narrative:    EXAMINATION:  CT ABDOMEN PELVIS WITH CONTRAST    CLINICAL HISTORY:  Abdominal pain, acute, nonlocalized;    TECHNIQUE:  Axial CT images with sagittal and coronal reformats were obtained of the abdomen and pelvis from the hemidiaphragms through the symphysis pubis after the administration of 100mL Omnipaque 350.    COMPARISON:  None available.    FINDINGS:  Examination is limited due to streak artifact from the right arm patient's arm which is within the field of view.    Lung Bases: There is complete opacification of the partially imaged left lower lobe with leftward mediastinal shift suspicious for left lower lobe collapse.  Opacification of the left lower lobe bronchi noted.  There is a small left pleural effusion.  Mild heterogeneous opacities also noted in the lingula.  The right lung base is grossly clear.    Heart: The heart is mildly enlarged.  No pericardial effusion.    Liver: The liver is normal in size and demonstrates homogeneous enhancement without focal lesion.  The portal vasculature is patent.    Biliary tract: There is severe intra and extrahepatic biliary ductal dilation.    Gallbladder: The gallbladder is distended.  There is mild pericholecystic fluid which is likely reactive.  There are several small layering gallstones.    Pancreas: There is a 2.3 x 3.2 cm  pancreatic head mass with resultant severe dilation the pancreatic duct.  The duct measures up to approximately 1.4 cm when measured in the region of the pancreatic neck.  There is an additional hypoattenuating focus in or adjacent to the posterior aspect of the pancreatic head measuring 1.5 cm (series 2, image 69).  There is atrophy of the body and tail of the pancreas.  There is a 1.6 cm lesion in or adjacent to the pancreatic tail (series 2, image 39).    Spleen: Normal size without focal lesion.  There is a surgical clip at the splenic hilum.    Adrenals: There is a 1.1 cm fat containing lesion in the adrenal gland compatible with a small myelolipoma or adenoma.    Kidneys and urinary collecting systems: Too small to characterize hypodensity in the left kidney noted.  Otherwise the kidneys are normal.  No hydronephrosis or urolithiasis.    Lymph nodes: There are several mildly prominent nodes at the root of the mesentery with associated mesenteric stranding, findings which can be seen with mesenteric panniculitis alternatively, metastatic adenopathy cannot be entirely excluded.    Stomach and bowel: The stomach is normal.  There is a large amount of stool with distention of the cecum.  No evidence of volvulus, large bowel obstruction, wall thickening, or pneumatosis.  The small bowel is normal in caliber without bowel obstruction.  The appendix is normal.    Peritoneum and mesentery: No ascites or free intraperitoneal air.  No abdominal fluid collection.    Vasculature: There is abutment and mild narrowing of the portal confluence.  No abutment of the SMA.  There is an IVC filter in place.  Minimal atherosclerosis.  No aneurysm the    Urinary bladder: Contracted around a Roca catheter.  There is air in the bladder lumen, expected post instrumentation.  There is bladder wall thickening, likely due to contracted status.    Reproductive organs: The prostate is normal in size.    Body wall: There is a fat  containing umbilical hernia.    Musculoskeletal: There is heterogeneity of the right proximal femur with a suspected ghost tract from a prior intramedullary nail, and high-grade heterotopic ossification of the right proximal femur, correlate for history of recent trauma or surgery.  There are postoperative changes at the left sacroiliac joint.  There are remote fractures of the bilateral inferior and superior pubic rami.  There are multiple remote healed posterior left-sided rib fractures.  There are degenerative changes of the left hip and lumbar spine.  There is no evidence of an aggressive osseous lesion to suggest osseous metastatic disease.                                       CT Head Without Contrast (Final result)  Result time 02/11/23 19:53:35      Final result by Chris Cordova MD (02/11/23 19:53:35)                   Impression:      No detrimental change, acute large vascular territory infarct or intracranial hemorrhage identified.  If persistent neurologic deficit, MRI brain can be obtained.    Grossly stable sequela of chronic microvascular ischemic change, senescent change and remote infarcts involving the left temporoparietal region and left thalamus.    Stable asymmetry of the lateral ventricles, suggesting ex vacuo dilatation.    Slightly improved sinus disease compared to 05/12/2022, as above.      Electronically signed by: Chris Cordova MD  Date:    02/11/2023  Time:    19:53               Narrative:    EXAMINATION:  CT HEAD WITHOUT CONTRAST    CLINICAL HISTORY:  altered mental status;    TECHNIQUE:  Low dose axial CT images obtained throughout the head without intravenous contrast. Sagittal and coronal reconstructions were performed.    COMPARISON:  Head CT 05/12/2022    FINDINGS:  Intracranial compartment:    Generalized cerebral volume loss.  Patchy hypoattenuation of the subcortical and periventricular white matter, left more than right, likely sequela of chronic microvascular ischemic  change.  There is remote infarct involving the left thalamus.  Encephalomalacia within the left temporal and parietal lobes, noting scattered punctate foci of calcification, stable.  No extra-axial blood or fluid collections.  The ventricles appear stable without distortion by mass effect or acute hydrocephalus noting similar ex vacuo dilatation of the left lateral ventricle and cavum septum pellucidum.    No definite new focal areas of abnormal parenchymal attenuation.  No parenchymal mass, hemorrhage, edema or acute major vascular distribution infarct.  Skull base atherosclerotic vascular calcifications noted.    Skull/extracranial contents (limited evaluation): No fracture. Patchy mucosal thickening of the paranasal sinuses with air-fluid levels in the bilateral sphenoid sinuses, slightly improved from 05/12/2022 study.  Right mastoid air cells are clear.  Similar minimal partial opacification of left mastoid air cells.  Imaged portions of the orbits are within normal limits.                                       X-Ray Chest 1 View (Final result)  Result time 02/11/23 16:57:08      Final result by Tara Dwyer MD (02/11/23 16:57:08)                   Impression:      Today's findings are suggestive of a combination of left lung atelectasis causing shift of the central structures and pleural effusion.      Electronically signed by: Tara Dwyer MD  Date:    02/11/2023  Time:    16:57               Narrative:    EXAMINATION:  XR CHEST 1 VIEW    CLINICAL HISTORY:  Shortness of breath;    TECHNIQUE:  Single frontal view of the chest was performed.    COMPARISON:  05/13/2022    FINDINGS:  The lungs are symmetrically hypoinflated.  Although the patient is rotated on this exam.  There may be a component of volume loss secondary to atelectasis causing a shift of the mediastinum and cardiac silhouette to the left.  There is dense airspace opacification which extends to the left lung apex.  The right lung is  free of abnormality.                                       X-Ray Chest PA And Lateral (In process)

## 2023-02-12 NOTE — ASSESSMENT & PLAN NOTE
Upon initial assessment, Pt was oriented to person, place, and month (did not know day or week or year)  On second assessment, remains alert but not oriented to place  CT head unremarkable for acute process  Altered mentation could be secondary to infection vs hyponatremia vs hyperammonemia     Plan:   - NPO for now  - follow-up SLP consult  - delirium precautions

## 2023-02-12 NOTE — CONSULTS
[facility] - Cardiac Medical ICU  [facility]  Consult Note    Patient Name: Anthony Borges  MRN: [id number]   Admission Date: 2/11/2023  Hospital Length of Stay: 1 days  Code Status: Full Code   Attending Provider: Mando Elliott MD   Consulting Provider: Ari Morales MD  Primary Care Physician: Bean Pearce MD  Principal Problem:Pancreatic mass    Inpatient consult to [facility] ([facility])  Consult performed by: Ari Morales MD  Consult ordered by: Cipriano Ho DO        Subjective:     HPI:  Mr. Anthony Borges is a 62 year old male for whom AES is consulted for evaluation of pancreatic mass. He has a PMH significant for reported COPD (no PFT's on file) and CVA (with residual right sided deficits and not on antiplatelet therapy).     Hospital Course: Patient presented to [facility] on 02/11 from nursing home with unspecified duration of confusion and nursing staff noticing new onset jaundice. On arrival, vital signs notable for hypotension (SBP 80-90's). Labs notable for leukocytosis (22), hyponatremia (124), hypokalemia (2.8), transaminitis (, ), hyperbilirubinemia (22), and normal lipase. CT A/P showed a 2.3 X 3.2 cm mass in the pancreatic head with severe dilation of the pancreatic duct and intra and extra-hepatic biliary ductal dilatation; liver was normal in size and gallbladder was distended with several small gallstones; prominent mesenteric lymph nodes also noted. He was evaluated by general surgery with regards to gallbladder abnormalities on CT and this was attributed to a reactive change from associated biliary dilatation. He was admitted to MICU and initiated on broad spectrum antibiotics and vasopressor support. AES consulted.     On initial bedside interview, patient was awake, alert, and oriented to person and place. He reports onset of intermittent epigastric pain beginning on approximately 02/04/2023. He reports symptom association with  progressive skin and eye yellowing, but denies associated pain with PO intake, pale colored stools, and prior episodes of pancreatitis. He does reports a prior history of tobacco abuse.       Past Medical History:   Diagnosis Date    Alcohol abuse     Aphasia     COPD (chronic obstructive pulmonary disease)     GERD (gastroesophageal reflux disease)     Hypertension     Hypokalemia     Opioid abuse     Stroke     Unspecified glaucoma        History reviewed. No pertinent surgical history.    Review of patient's allergies indicates:  No Known Allergies  Family History    None       Tobacco Use    Smoking status: Never    Smokeless tobacco: Never   Substance and Sexual Activity    Alcohol use: Not on file    Drug use: Not Currently    Sexual activity: Not on file     Review of Systems   Constitutional:  Positive for fatigue. Negative for appetite change, chills and fever.   HENT:  Negative for congestion, sore throat and trouble swallowing.    Eyes:  Negative for photophobia, pain and discharge.   Respiratory:  Negative for cough and shortness of breath.    Cardiovascular:  Negative for chest pain and palpitations.   Gastrointestinal:  Positive for abdominal pain. Negative for diarrhea, nausea and vomiting.   Genitourinary:  Negative for difficulty urinating.   Musculoskeletal:  Negative for back pain, myalgias and neck pain.   Skin:  Positive for color change. Negative for pallor and rash.   Neurological:  Positive for weakness. Negative for light-headedness, numbness and headaches.   Objective:     Vital Signs (Most Recent):  Temp: 98.6 °F (37 °C) (02/12/23 0800)  Pulse: 108 (02/12/23 1000)  Resp: 14 (02/12/23 1000)  BP: (!) 90/57 (02/12/23 1000)  SpO2: (!) 94 % (02/12/23 1000)   Vital Signs (24h Range):  Temp:  [97.8 °F (36.6 °C)-98.6 °F (37 °C)] 98.6 °F (37 °C)  Pulse:  [] 108  Resp:  [13-20] 14  SpO2:  [91 %-100 %] 94 %  BP: ()/(50-71) 90/57     Weight: 92.1 kg (203 lb) (02/11/23  1404)  Body mass index is 26.78 kg/m².      Intake/Output Summary (Last 24 hours) at 2/12/2023 1015  Last data filed at 2/12/2023 0959  Gross per 24 hour   Intake 1944.8 ml   Output 2060 ml   Net -115.2 ml       Lines/Drains/Airways       Drain  Duration                  Urethral Catheter 02/11/23 1624 Straight-tip <1 day              Peripheral Intravenous Line  Duration                  Peripheral IV - Single Lumen 02/11/23 1528 20 G Left Shoulder <1 day         Peripheral IV - Single Lumen 02/12/23 0745 20 G Left;Anterior;Medial Antecubital <1 day                    Physical Exam  Constitutional:       Appearance: He is ill-appearing.   Eyes:      General: Scleral icterus present.      Conjunctiva/sclera: Conjunctivae normal.   Cardiovascular:      Rate and Rhythm: Normal rate and regular rhythm.      Pulses: Normal pulses.      Heart sounds: Normal heart sounds.   Pulmonary:      Effort: Pulmonary effort is normal. No respiratory distress.      Breath sounds: Normal breath sounds.   Abdominal:      General: Bowel sounds are normal. There is distension.      Palpations: Abdomen is soft. There is no fluid wave.      Tenderness: There is abdominal tenderness in the epigastric area.   Skin:     General: Skin is warm and dry.      Coloration: Skin is jaundiced.   Neurological:      Mental Status: He is alert and oriented to person, place, and time.       Significant Labs:  All pertinent lab results from the last 24 hours have been reviewed.    Significant Imaging:  Imaging results within the past 24 hours have been reviewed.    Assessment/Plan:     * Pancreatic mass  This is a 62 year old male nursing home resident with a PMH significant for reported COPD (no PFT's on file) and CVA (with residual right sided deficits and not on antiplatelet therapy) who presented to [facility] on 02/11 for management of septic shock associated with acute metabolic encephalopathy. Work-up thus far significant for labs showing  hyponatremia (124) and new transaminitis in predominant cholestatic pattern with hyperbilirubinemia (22) and imaging showing left lower lobe effusion and a 2.3 X 3.2 cm mass in the pancreatic head with severe dilation of the pancreatic duct and intra and extra-hepatic biliary ductal dilatation and prominent mesenteric lymph nodes concerning for metastatic disease. He is status post evaluation by general surgery and deemed not a candidate for Whipple. Etiology of sepsis suspected secondary to pneumonia versus cholangitis. Encephalopathy now improving with supportive care.     Recommendations:     -Agree with broad spectrum antibiotics and following up blood cultures.   -Goals of care discussion with regards to patient/family preference for pursuing further treatment of suspected new pancreatic cancer in the setting of frailty and without candidacy for surgical therapy. He would also benefit from input from medical oncology.   -Will tentatively plan for EUS/ERCP on 02/13 for further evaluation of pancreatic mass and biliary tree pending results of goals of care discussion and improvement in serum sodium. Make NPO for 02/13 and hold any anticoagulation for this evening.   -CMP and INR daily.         Thank you for your consult. I will follow-up with patient. Please contact us if you have any additional questions.    Ari Morales MD  Gastroenterology  [facility] - Cardiac Medical ICU

## 2023-02-12 NOTE — ASSESSMENT & PLAN NOTE
Meets sepsis criteria on presentation with leukocytosis and tachycardia  Pt reports abdominal pain and dysuria for the past 4-5 days  Some concern for possible cholecystitis on CT abdo  Source could be intra-abdominal vs urinary although UA not concerning for infection  CXR showing left sided atelectasis and pleural effusion which could be obscuring underlying infection    Plan:  - continue broad spectrum abx including atypical coverage for lung source   - f/u BCx, UCx  - given 2.5L IV fluids and initially responded, however subsequently became hypotensive; peripheral vasopressors with MAP goal > 65, wean as tolerated  - US abdomen to assess for cholecystitis  - seen by gen surg who recommended no surgical intervention in setting of likely metastatic pancreatic cancer and poor surgical candidacy; felt the inflammation of his gallbladder was likely from biliary obstruction from the pancreatic mass

## 2023-02-12 NOTE — ASSESSMENT & PLAN NOTE
This is a 62 year old male nursing home resident with a PMH significant for reported COPD (no PFT's on file) and CVA (with residual right sided deficits and not on antiplatelet therapy) who presented to [facility] on 02/11 for management of septic shock associated with acute metabolic encephalopathy. Work-up thus far significant for labs showing hyponatremia (124) and new transaminitis in predominant cholestatic pattern with hyperbilirubinemia (22) and imaging showing left lower lobe effusion and a 2.3 X 3.2 cm mass in the pancreatic head with severe dilation of the pancreatic duct and intra and extra-hepatic biliary ductal dilatation and prominent mesenteric lymph nodes concerning for metastatic disease. He is status post evaluation by general surgery and deemed not a candidate for Whipple. Etiology of sepsis suspected secondary to pneumonia versus cholangitis. Encephalopathy now improving with supportive care.     Recommendations:     -Agree with broad spectrum antibiotics and following up blood cultures.   -Goals of care discussion with regards to patient/family preference for pursuing further treatment of suspected new pancreatic cancer in the setting of frailty and without candidacy for surgical therapy. He would also benefit from input from medical oncology.   -Will tentatively plan for EUS/ERCP on 02/13 for further evaluation of pancreatic mass and biliary tree pending results of goals of care discussion and improvement in serum sodium. Make NPO for 02/13 and hold any anticoagulation for this evening.   -CMP and INR daily.

## 2023-02-12 NOTE — ASSESSMENT & PLAN NOTE
"-Upon initial evaluation, patient was disoriented x4(disoriented to person, place, time and situation). CT head nonacute.  He was able to tolerate nursing bedside swallow assessment.  -Differential diagnoses include, but not limited to: sepsis vs. Hyponatremia vs. hyperammonemia     PLAN  -Follow-up SLP consult  -Delirium precautions  - See "Sepsis, hyponatremia, and hyperammonemia" A&P    "

## 2023-02-12 NOTE — H&P
[facility] - Cardiac Medical ICU  Critical Care Medicine  History & Physical    Patient Name: Anthony Borges  MRN: [id number]  Admission Date: 2/11/2023  Hospital Length of Stay: 1 days  Code Status: Full Code  Attending Physician: Mando Elliott MD   Primary Care Provider: Bean Pearce MD   Principal Problem: Pancreatic mass    Subjective:     HPI:  Mr. Anthony Borges is a 62 y.o. male with a PMHx of HTN, COPD, and CVA (residual RUE contracture and RLE weakness) who presented from a nursing home after the staff noticed he was jaundiced a day ago. Nursing staff also reports confusion and fatigue. HPI limited as patient is confused but he reports abdominal pain for the last 5 days and dysuria for the last 4 days. Denies any fevers, chills, chest pain, SOB, cough, nausea, diarrhea, or vomiting. He is prescribed an albuterol inhaler for COPD, denies sleeping with a bipap at night.     In the ED patient was afebrile, tachycardic to 110, BP 90/64, and was on RA. Labs significant for a WBC 16.1, Na 124, K 2.8, Alk phos 1600, Tbili 29, , , Ammonia 67. BNP, trop, and lacate x2 were normal. Initial ABG with a pH of 7.15 pCO2 of 76, repeat with a pH 7.54 and pCO2 of 33 without the use of bipap. Patient was given a dose of vanz/zosyn and was given 2.4L NS. CT head with no acute findings. CT abdomen with a large mass in the pancreatic head resulting in severe pancreatic ductal dilation and severe intra and extrahepatic biliary ductal dilation. Gen surg consulted and stated patient not a surgical candidate at this time. MICU consulted for sepsis and hyponatremia.    Past Medical History:   Diagnosis Date    Alcohol abuse     Aphasia     COPD (chronic obstructive pulmonary disease)     GERD (gastroesophageal reflux disease)     Hypertension     Hypokalemia     Opioid abuse     Stroke     Unspecified glaucoma      History reviewed. No pertinent surgical history.    Review of patient's allergies  indicates:  No Known Allergies    Family History    None       Tobacco Use    Smoking status: Never    Smokeless tobacco: Never   Substance and Sexual Activity    Alcohol use: Not on file    Drug use: Not Currently    Sexual activity: Not on file      Review of Systems   Unable to perform ROS: Acuity of condition   Gastrointestinal:  Positive for abdominal pain.   Genitourinary:  Positive for dysuria.   Objective:     Vital Signs (Most Recent):  Temp: 98.3 °F (36.8 °C) (02/12/23 0436)  Pulse: 101 (02/12/23 0442)  Resp: 16 (02/12/23 0442)  BP: (!) 82/53 (02/12/23 0442)  SpO2: 98 % (02/12/23 0442)   Vital Signs (24h Range):  Temp:  [98.2 °F (36.8 °C)-98.3 °F (36.8 °C)] 98.3 °F (36.8 °C)  Pulse:  [100-110] 101  Resp:  [14-20] 16  SpO2:  [91 %-100 %] 98 %  BP: ()/(51-71) 82/53   Weight: 92.1 kg (203 lb)  Body mass index is 26.78 kg/m².      Intake/Output Summary (Last 24 hours) at 2/12/2023 0448  Last data filed at 2/12/2023 0422  Gross per 24 hour   Intake --   Output 1450 ml   Net -1450 ml       Physical Exam  Vitals and nursing note reviewed.   Constitutional:       General: He is not in acute distress.     Appearance: He is ill-appearing.   HENT:      Head: Normocephalic and atraumatic.   Eyes:      General: Scleral icterus present.      Extraocular Movements: Extraocular movements intact.      Pupils: Pupils are equal, round, and reactive to light.   Cardiovascular:      Rate and Rhythm: Regular rhythm. Tachycardia present.      Pulses: Normal pulses.      Heart sounds: No murmur heard.  Pulmonary:      Effort: Pulmonary effort is normal.      Breath sounds: Rales (bases) present. No wheezing or rhonchi.      Comments: On non-rebreather 15L satting 100%, weaned to 10L and able to maintain sats   Abdominal:      General: Bowel sounds are normal. There is no distension.      Palpations: Abdomen is soft.      Tenderness: There is abdominal tenderness. There is no guarding.   Musculoskeletal:          General: No tenderness.      Right lower leg: No edema.      Left lower leg: No edema.   Skin:     Coloration: Skin is jaundiced.   Neurological:      Mental Status: He is alert.     Vents:     Lines/Drains/Airways       Drain  Duration                  Urethral Catheter 02/11/23 1624 Straight-tip <1 day              Peripheral Intravenous Line  Duration                  Peripheral IV - Single Lumen 02/11/23 1528 20 G Left Shoulder <1 day         Peripheral IV - Single Lumen 02/12/23 0432 20 G Distal;Left Upper Arm <1 day                  Significant Labs:    CBC/Anemia Profile:  Recent Labs   Lab 02/11/23  1510 02/11/23  1531 02/11/23  2147   WBC 18.61*  --   --    HGB 11.6*  --   --    HCT 37.4* 41 37     --   --    MCV 69*  --   --    RDW 22.3*  --   --         Chemistries:  Recent Labs   Lab 02/11/23  1510   *   K 2.8*   CL 84*   CO2 24   BUN 10   CREATININE 0.6   CALCIUM 9.3   ALBUMIN 2.2*   PROT 7.2   BILITOT 29.3*   ALKPHOS 1,621*   *   *       Blood Culture:   Recent Labs   Lab 02/11/23  1519   LABBLOO No Growth to date  No Growth to date     CMP:   Recent Labs   Lab 02/11/23  1510   *   K 2.8*   CL 84*   CO2 24   *   BUN 10   CREATININE 0.6   CALCIUM 9.3   PROT 7.2   ALBUMIN 2.2*   BILITOT 29.3*   ALKPHOS 1,621*   *   *   ANIONGAP 16     Lactic Acid:   Recent Labs   Lab 02/11/23  1520 02/11/23  1941   LACTATE 1.1 1.1     Urine Culture: No results for input(s): LABURIN in the last 48 hours.  Recent Lab Results  (Last 5 results in the past 24 hours)        02/12/23  0429   02/12/23  0359   02/12/23  0238   02/12/23  0237 02/11/23 2147        Allens Test         Pass       Site         LR       DelSys         Room Air       FiO2         21       Mode         SPONT       Osmolality   273             POC BE         6       POC Glucose     113           POC HCO3         28.5       POC Hematocrit         37       POC Ionized Calcium         0.96       POC  PCO2         33.1       POC PH         7.544       POC PO2         63       POC Potassium         2.3       POC SATURATED O2         95       POC Sodium         127       POC TCO2         30       POCT Glucose 97       113         Sample         ARTERIAL                            All pertinent labs within the past 24 hours have been reviewed.    Significant Imaging: I have reviewed all pertinent imaging results/findings within the past 24 hours.    Assessment/Plan:     Neuro  Acute encephalopathy  Upon initial assessment, Pt was oriented to person, place, and month (did not know day or week or year)  On second assessment, remains alert but not oriented to place  CT head unremarkable for acute process  Altered mentation could be secondary to infection vs hyponatremia vs hyperammonemia     Plan:   - NPO for now  - follow-up SLP consult  - delirium precautions    History of CVA (cerebrovascular accident)  Previous 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  Bedbound at baseline and lives in NH     Plan:  - continue home keppra  - seizure precautions   - turn Pt every 2 hours  - pressure ulcer precautions per nursing    Pulmonary  Chronic obstructive lung disease  On albuterol PRN outpatient   No signs of wheezing or COPD exacerbation   Initial ABG with a pH of 7.15 and CO2 76, repeat pH 7.54 and pCO2 33 without bipap  Saturating > 92% on RA    Plan:  - PRN albuterol     Cardiac/Vascular  Hyperlipidemia  Hold home statin in setting of transaminitis     Renal/  Hyponatremia  Hyponatremic on presentation with Na 124  Baseline ~ 140  Also has low chloride  Pt oriented to person, place, and month but does appear somewhat confused; unclear if baseline given h/o CVA or 2/2 likely locally metastatic disease, infection, and elevated ammonia, etc vs hyponatremia   Hyponatremia possibly 2/2 malnutrition, poor PO intake in setting of abdominal pain  Could also be pseudohyponatremia in setting of jaundice    Na improved to 127 on blood gas after administration of IV fluids    Plan:  - follow-up urine Na, serum osm, urine osm  - monitor Na Q6h  - hold off on further methods to correct as Pt has already increased Na by 3 points in 3 hours; goal to increase sodium by 6-8 mEq/L/day in 24-hour period     ID  Sepsis  Meets sepsis criteria on presentation with leukocytosis and tachycardia  Pt reports abdominal pain and dysuria for the past 4-5 days  Some concern for possible cholecystitis on CT abdo  Source could be intra-abdominal vs urinary although UA not concerning for infection  CXR showing left sided atelectasis and pleural effusion which could be obscuring underlying infection    Plan:  - continue broad spectrum abx including atypical coverage for lung source   - f/u BCx, UCx  - given 2.5L IV fluids and initially responded, however subsequently became hypotensive; peripheral vasopressors with MAP goal > 65, wean as tolerated  - US abdomen to assess for cholecystitis  - seen by gen surg who recommended no surgical intervention in setting of likely metastatic pancreatic cancer and poor surgical candidacy; felt the inflammation of his gallbladder was likely from biliary obstruction from the pancreatic mass    Oncology  Elevated CA 19-9 level  See pancreatic mass    Endocrine  Hyperammonemia  Ammonia elevated 67 on admission  Could have element of liver disease and hepatic encephalopathy contributing to altered mentation     Plan:  - lactulose 20g TID  - adjust dose to achieve 2 to 3 soft stools/day       GI  * Pancreatic mass  Presented from nursing home due to jaundice and abdominal pain  On admission Tbili 29, Alk phos 1600, , Alt 393  CT abdomen/pelvis from 2/11 showing large mass in the pancreatic head, severe dilation of pancreatic duct and intra/extra hepatic biliary ducts as well as prominent lymph nodes concerning for metastatic disease  CA-A 19-9 ordered by ED and found to be elevated [id number]  Seen by gen  surg who advised Pt is not a surgical candidate and is unlikely to tolerate a Whipple procedure    Plan:  - NPO   - U/S to evaluate for cholecystitis   - AES consult for possible MRCP/ERCP   - heme/onc consult   - analgesia prn  - hold home atorvastatin 2/2 transaminitis  - trend LFTs    Transaminitis  See pancreatitic mass    Gastroesophageal reflux disease  Continue home famotidine       Critical Care Daily Checklist:    A: Awake: RASS Goal/Actual Goal:    Actual:     B: Spontaneous Breathing Trial Performed?     C: SAT & SBT Coordinated?  n/a                      D: Delirium: CAM-ICU     E: Early Mobility Performed? No   F: Feeding Goal:    Status:     Current Diet Order   Procedures    Diet NPO Except for: Medication, Sips with Medication, Ice Chips     Order Specific Question:   Except for     Answer:   Medication     Order Specific Question:   Except for     Answer:   Sips with Medication     Order Specific Question:   Except for     Answer:   Ice Chips      AS: Analgesia/Sedation N/A   T: Thromboembolic Prophylaxis heparin   H: HOB > 300 Yes   U: Stress Ulcer Prophylaxis (if needed) famotidine   G: Glucose Control controlled   B: Bowel Function     I: Indwelling Catheter (Lines & Roca) Necessity PIVC   D: De-escalation of Antimicrobials/Pharmacotherapies Broad spectrum abx    Plan for the day/ETD Admit to MICU, vasopressors, f/u scans and labs     Code Status:  Family/Goals of Care: Full Code       Critical secondary to Patient has a condition that poses threat to life and bodily function: sepsis     Critical care was time spent personally by me on the following activities: development of treatment plan with patient or surrogate and bedside caregivers, discussions with consultants, evaluation of patient's response to treatment, examination of patient, ordering and performing treatments and interventions, ordering and review of laboratory studies, ordering and review of radiographic studies, pulse oximetry,  re-evaluation of patient's condition. This critical care time did not overlap with that of any other provider or involve time for any procedures.     Khadra Cavanaugh MD  Critical Care Medicine  [facility] - Cardiac Medical ICU

## 2023-02-12 NOTE — PT/OT/SLP PROGRESS
Speech Language Pathology  Missed Visit      Anthony Borges  MRN: [id number]    Patient not seen today secondary to NPO for possible procedure. Will follow-up next service date.

## 2023-02-12 NOTE — AI DETERIORATION ALERT
"RAPID RESPONSE NURSE AI ALERT       AI alert received.    Chart Reviewed: 02/12/2023, 3:21 AM    MRN: [id number]  Bed: ED 11/11    Dx: Pancreatic mass    Anthony Borges has a past medical history of Alcohol abuse, Aphasia, COPD (chronic obstructive pulmonary disease), GERD (gastroesophageal reflux disease), Hypertension, Hypokalemia, Opioid abuse, Stroke, and Unspecified glaucoma.    Last VS: BP (!) 90/58   Pulse 101   Temp 98.2 °F (36.8 °C) (Oral)   Resp 18   Ht 6' 1" (1.854 m)   Wt 92.1 kg (203 lb)   SpO2 96%   BMI 26.78 kg/m²     24H Vital Sign Range:  Temp:  [98.2 °F (36.8 °C)]   Pulse:  [101-110]   Resp:  [15-20]   BP: ()/(56-71)   SpO2:  [93 %-98 %]     Level of Consciousness (AVPU): alert    Recent Labs     02/11/23  1510 02/11/23  1531 02/11/23  2147   WBC 18.61*  --   --    HGB 11.6*  --   --    HCT 37.4* 41 37     --   --        Recent Labs     02/11/23  1510   *   K 2.8*   CL 84*   CO2 24   CREATININE 0.6   *        Recent Labs     02/11/23  1528 02/11/23  2147   PH 7.145* 7.544*   PCO2 76.0* 33.1*   PO2 65* 63*   HCO3 26.2 28.5*   POCSATURATED 84* 95   BE -3 6        OXYGEN:  Flow (L/min): 1          MEWS score: 3    Bedside RNSushila  contacted. No concerns verbalized at this time. Instructed to call [phone] for further concerns or assistance.    Edward Goodwin RN        "

## 2023-02-12 NOTE — ASSESSMENT & PLAN NOTE
"-CT abdomen and pelvis[02/11/23] noted concerns for Cholelithiasis and mild pericholecystic fluid, likely reactive  and recommended further evaluation with right upper quadrant ultrasound if concerns for acute cholecystitis    -General surgery was consulted and noted gallbladder inflammation likely from biliary obstruction secondary to pancreatic mass.  Also noted that given his co-morbidities he would be a poor surgical candidate to tolerate a Whipple procedure.    PLAN  Followup right upper quadrant ultrasound  See "Pancreatic mass" A&P  "

## 2023-02-12 NOTE — HPI
Mr. 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     Mr. Borges is oriented to self only and unable to provide history. He denies any complaints at time of interview. He presented to the ED last night with tachycardia, hypotension, bilirubin of 29, elevated LFTs, and WBC of 16. CT abd/pelvis was obtained and showed a large mass at the head of the pancreas with severe biliary ductal dilation. He also has prominent intra-abdominal lymph nodes and left lower lobe collapse with pleural effusion. CA 19-9 was >[id number]. He was admitted to the MICU for septic shock and is now on a low dose of norepinephrine.

## 2023-02-12 NOTE — ED NOTES
Oxygen sat dropped to 89% on 1L NC, increased to 6L NC wit O2 sat of 92&. Pt placed on nonrebreather. MD notified, awaiting orders

## 2023-02-12 NOTE — ASSESSMENT & PLAN NOTE
62M with pancreatic head mass with lesions suspicious for metastatic disease. Gen surg consulted for cholecystitis. The inflammation of the gallbladder is likely from biliary obstruction from the pancreatic mass. No operative intervention for gallbladder.    In regards to his pancreatic cancer, he seems to have grossly metastatic disease. Additionally, he is bedbound and lives in a nursing home and has history of COPD, CVA with residual right sided weakness, and VTE s/p IVC filter. He would not tolerate a Whipple even if he proves to not have metastatic disease, which is unlikely.    Recommendations:  Admit to medicine  AES consult for biliary stent placement and EUS biopsy  Heme-onc consult  Continue abx    I have discussed the above with the patient. He has limited understanding.    Surgery will sign off.

## 2023-02-13 ENCOUNTER — ANESTHESIA EVENT (OUTPATIENT)
Dept: ENDOSCOPY | Facility: HOSPITAL | Age: 63
DRG: 871 | End: 2023-02-13
Payer: MEDICAID

## 2023-02-13 ENCOUNTER — ANESTHESIA (OUTPATIENT)
Dept: ENDOSCOPY | Facility: HOSPITAL | Age: 63
DRG: 871 | End: 2023-02-13
Payer: MEDICAID

## 2023-02-13 PROBLEM — R11.0 NAUSEA: Status: ACTIVE | Noted: 2023-02-13

## 2023-02-13 PROBLEM — Z71.89 ADVANCE CARE PLANNING: Status: ACTIVE | Noted: 2023-02-13

## 2023-02-13 PROBLEM — Z51.5 PALLIATIVE CARE ENCOUNTER: Status: ACTIVE | Noted: 2023-02-13

## 2023-02-13 PROBLEM — Z71.89 GOALS OF CARE, COUNSELING/DISCUSSION: Status: ACTIVE | Noted: 2023-02-13

## 2023-02-13 LAB
ABO + RH BLD: NORMAL
ALBUMIN SERPL BCP-MCNC: 1.8 G/DL (ref 3.5–5.2)
ALP SERPL-CCNC: 1197 U/L (ref 55–135)
ALT SERPL W/O P-5'-P-CCNC: 297 U/L (ref 10–44)
ANION GAP SERPL CALC-SCNC: 15 MMOL/L (ref 8–16)
AST SERPL-CCNC: 306 U/L (ref 10–40)
BASOPHILS # BLD AUTO: 0.08 K/UL (ref 0–0.2)
BASOPHILS NFR BLD: 0.4 % (ref 0–1.9)
BILIRUB SERPL-MCNC: 27.4 MG/DL (ref 0.1–1)
BLD GP AB SCN CELLS X3 SERPL QL: NORMAL
BUN SERPL-MCNC: 5 MG/DL (ref 8–23)
CALCIUM SERPL-MCNC: 8.2 MG/DL (ref 8.7–10.5)
CHLORIDE SERPL-SCNC: 91 MMOL/L (ref 95–110)
CO2 SERPL-SCNC: 26 MMOL/L (ref 23–29)
CREAT SERPL-MCNC: 0.5 MG/DL (ref 0.5–1.4)
DIFFERENTIAL METHOD: ABNORMAL
EOSINOPHIL # BLD AUTO: 0 K/UL (ref 0–0.5)
EOSINOPHIL NFR BLD: 0.2 % (ref 0–8)
ERYTHROCYTE [DISTWIDTH] IN BLOOD BY AUTOMATED COUNT: 20.9 % (ref 11.5–14.5)
EST. GFR  (NO RACE VARIABLE): >60 ML/MIN/1.73 M^2
GLUCOSE SERPL-MCNC: 121 MG/DL (ref 70–110)
HCT VFR BLD AUTO: 29.8 % (ref 40–54)
HGB BLD-MCNC: 9.6 G/DL (ref 14–18)
IMM GRANULOCYTES # BLD AUTO: 0.15 K/UL (ref 0–0.04)
IMM GRANULOCYTES NFR BLD AUTO: 0.8 % (ref 0–0.5)
LYMPHOCYTES # BLD AUTO: 1.6 K/UL (ref 1–4.8)
LYMPHOCYTES NFR BLD: 8.2 % (ref 18–48)
MAGNESIUM SERPL-MCNC: 1.6 MG/DL (ref 1.6–2.6)
MCH RBC QN AUTO: 21.7 PG (ref 27–31)
MCHC RBC AUTO-ENTMCNC: 32.2 G/DL (ref 32–36)
MCV RBC AUTO: 67 FL (ref 82–98)
MONOCYTES # BLD AUTO: 1.6 K/UL (ref 0.3–1)
MONOCYTES NFR BLD: 8.2 % (ref 4–15)
NEUTROPHILS # BLD AUTO: 16.3 K/UL (ref 1.8–7.7)
NEUTROPHILS NFR BLD: 82.2 % (ref 38–73)
NRBC BLD-RTO: 0 /100 WBC
PHOSPHATE SERPL-MCNC: 2.7 MG/DL (ref 2.7–4.5)
PLATELET # BLD AUTO: 378 K/UL (ref 150–450)
PMV BLD AUTO: 11.1 FL (ref 9.2–12.9)
POCT GLUCOSE: 109 MG/DL (ref 70–110)
POCT GLUCOSE: 117 MG/DL (ref 70–110)
POCT GLUCOSE: 123 MG/DL (ref 70–110)
POTASSIUM SERPL-SCNC: 3.1 MMOL/L (ref 3.5–5.1)
PROT SERPL-MCNC: 5.3 G/DL (ref 6–8.4)
RBC # BLD AUTO: 4.42 M/UL (ref 4.6–6.2)
SODIUM SERPL-SCNC: 132 MMOL/L (ref 136–145)
VANCOMYCIN TROUGH SERPL-MCNC: 24.5 UG/ML (ref 10–22)
WBC # BLD AUTO: 19.89 K/UL (ref 3.9–12.7)

## 2023-02-13 PROCEDURE — 88341 IMHCHEM/IMCYTCHM EA ADD ANTB: CPT | Mod: 26,,, | Performed by: PATHOLOGY

## 2023-02-13 PROCEDURE — 99497 ADVNCD CARE PLAN 30 MIN: CPT | Mod: 25,,, | Performed by: CLINICAL NURSE SPECIALIST

## 2023-02-13 PROCEDURE — 43242 EGD US FINE NEEDLE BX/ASPIR: CPT | Performed by: INTERNAL MEDICINE

## 2023-02-13 PROCEDURE — 25000003 PHARM REV CODE 250: Performed by: NURSE ANESTHETIST, CERTIFIED REGISTERED

## 2023-02-13 PROCEDURE — 80202 ASSAY OF VANCOMYCIN: CPT | Performed by: HOSPITALIST

## 2023-02-13 PROCEDURE — 86900 BLOOD TYPING SEROLOGIC ABO: CPT | Performed by: EMERGENCY MEDICINE

## 2023-02-13 PROCEDURE — 63600175 PHARM REV CODE 636 W HCPCS: Performed by: EMERGENCY MEDICINE

## 2023-02-13 PROCEDURE — 88342 CHG IMMUNOCYTOCHEMISTRY: ICD-10-PCS | Mod: 26,,, | Performed by: PATHOLOGY

## 2023-02-13 PROCEDURE — 88173 CYTOPATH EVAL FNA REPORT: CPT | Performed by: PATHOLOGY

## 2023-02-13 PROCEDURE — 83735 ASSAY OF MAGNESIUM: CPT | Performed by: STUDENT IN AN ORGANIZED HEALTH CARE EDUCATION/TRAINING PROGRAM

## 2023-02-13 PROCEDURE — 37000008 HC ANESTHESIA 1ST 15 MINUTES: Performed by: INTERNAL MEDICINE

## 2023-02-13 PROCEDURE — 43274 ERCP DUCT STENT PLACEMENT: CPT | Performed by: INTERNAL MEDICINE

## 2023-02-13 PROCEDURE — D9220A PRA ANESTHESIA: Mod: ANES,,, | Performed by: ANESTHESIOLOGY

## 2023-02-13 PROCEDURE — 88305 TISSUE EXAM BY PATHOLOGIST: ICD-10-PCS | Mod: 26,,, | Performed by: PATHOLOGY

## 2023-02-13 PROCEDURE — 99291 CRITICAL CARE FIRST HOUR: CPT | Mod: ,,, | Performed by: EMERGENCY MEDICINE

## 2023-02-13 PROCEDURE — 63600175 PHARM REV CODE 636 W HCPCS

## 2023-02-13 PROCEDURE — 25000003 PHARM REV CODE 250

## 2023-02-13 PROCEDURE — 84100 ASSAY OF PHOSPHORUS: CPT | Performed by: STUDENT IN AN ORGANIZED HEALTH CARE EDUCATION/TRAINING PROGRAM

## 2023-02-13 PROCEDURE — 88173 PR  INTERPRETATION OF FNA SMEAR: ICD-10-PCS | Mod: 26,,, | Performed by: PATHOLOGY

## 2023-02-13 PROCEDURE — 88173 CYTOPATH EVAL FNA REPORT: CPT | Mod: 26,,, | Performed by: PATHOLOGY

## 2023-02-13 PROCEDURE — 88342 IMHCHEM/IMCYTCHM 1ST ANTB: CPT | Performed by: PATHOLOGY

## 2023-02-13 PROCEDURE — 88172 CYTP DX EVAL FNA 1ST EA SITE: CPT | Mod: 26,,, | Performed by: PATHOLOGY

## 2023-02-13 PROCEDURE — 88342 IMHCHEM/IMCYTCHM 1ST ANTB: CPT | Mod: 26,,, | Performed by: PATHOLOGY

## 2023-02-13 PROCEDURE — 25500020 PHARM REV CODE 255: Performed by: INTERNAL MEDICINE

## 2023-02-13 PROCEDURE — 88172 PR  EVALUATION OF FNA SMEAR TO DETERMINE ADEQUACY, FIRST EVAL: ICD-10-PCS | Mod: 26,,, | Performed by: PATHOLOGY

## 2023-02-13 PROCEDURE — 63600175 PHARM REV CODE 636 W HCPCS: Performed by: STUDENT IN AN ORGANIZED HEALTH CARE EDUCATION/TRAINING PROGRAM

## 2023-02-13 PROCEDURE — D9220A PRA ANESTHESIA: ICD-10-PCS | Mod: CRNA,,, | Performed by: NURSE ANESTHETIST, CERTIFIED REGISTERED

## 2023-02-13 PROCEDURE — 37000009 HC ANESTHESIA EA ADD 15 MINS: Performed by: INTERNAL MEDICINE

## 2023-02-13 PROCEDURE — 25000003 PHARM REV CODE 250: Performed by: STUDENT IN AN ORGANIZED HEALTH CARE EDUCATION/TRAINING PROGRAM

## 2023-02-13 PROCEDURE — D9220A PRA ANESTHESIA: ICD-10-PCS | Mod: ANES,,, | Performed by: ANESTHESIOLOGY

## 2023-02-13 PROCEDURE — 85025 COMPLETE CBC W/AUTO DIFF WBC: CPT | Performed by: STUDENT IN AN ORGANIZED HEALTH CARE EDUCATION/TRAINING PROGRAM

## 2023-02-13 PROCEDURE — 88305 TISSUE EXAM BY PATHOLOGIST: CPT | Mod: 26,,, | Performed by: PATHOLOGY

## 2023-02-13 PROCEDURE — 27202131 HC NEEDLE, FNB SINGLE (ANY): Performed by: INTERNAL MEDICINE

## 2023-02-13 PROCEDURE — D9220A PRA ANESTHESIA: Mod: CRNA,,, | Performed by: NURSE ANESTHETIST, CERTIFIED REGISTERED

## 2023-02-13 PROCEDURE — 94761 N-INVAS EAR/PLS OXIMETRY MLT: CPT

## 2023-02-13 PROCEDURE — 88172 CYTP DX EVAL FNA 1ST EA SITE: CPT | Performed by: PATHOLOGY

## 2023-02-13 PROCEDURE — 88341 PR IHC OR ICC EACH ADD'L SINGLE ANTIBODY  STAINPR: ICD-10-PCS | Mod: 26,,, | Performed by: PATHOLOGY

## 2023-02-13 PROCEDURE — 20000000 HC ICU ROOM

## 2023-02-13 PROCEDURE — C1876 STENT, NON-COA/NON-COV W/DEL: HCPCS | Performed by: INTERNAL MEDICINE

## 2023-02-13 PROCEDURE — 88341 IMHCHEM/IMCYTCHM EA ADD ANTB: CPT | Performed by: PATHOLOGY

## 2023-02-13 PROCEDURE — 43274 PR ERCP W/STENT PLCMNT BILIARY/PANCREATIC DUCT: ICD-10-PCS | Mod: ,,, | Performed by: INTERNAL MEDICINE

## 2023-02-13 PROCEDURE — C1769 GUIDE WIRE: HCPCS | Performed by: INTERNAL MEDICINE

## 2023-02-13 PROCEDURE — 63600175 PHARM REV CODE 636 W HCPCS: Performed by: NURSE ANESTHETIST, CERTIFIED REGISTERED

## 2023-02-13 PROCEDURE — 99291 PR CRITICAL CARE, E/M 30-74 MINUTES: ICD-10-PCS | Mod: ,,, | Performed by: EMERGENCY MEDICINE

## 2023-02-13 PROCEDURE — 25000003 PHARM REV CODE 250: Performed by: INTERNAL MEDICINE

## 2023-02-13 PROCEDURE — 99497 PR ADVNCD CARE PLAN 30 MIN: ICD-10-PCS | Mod: 25,,, | Performed by: CLINICAL NURSE SPECIALIST

## 2023-02-13 PROCEDURE — 25000003 PHARM REV CODE 250: Performed by: EMERGENCY MEDICINE

## 2023-02-13 PROCEDURE — 43242 PR UPGI ENDOSCOPY,FN NEEDLE BX,GUIDED: ICD-10-PCS | Mod: 51,,, | Performed by: INTERNAL MEDICINE

## 2023-02-13 PROCEDURE — 43274 ERCP DUCT STENT PLACEMENT: CPT | Mod: ,,, | Performed by: INTERNAL MEDICINE

## 2023-02-13 PROCEDURE — 88305 TISSUE EXAM BY PATHOLOGIST: CPT | Performed by: PATHOLOGY

## 2023-02-13 PROCEDURE — 99223 1ST HOSP IP/OBS HIGH 75: CPT | Mod: ,,, | Performed by: CLINICAL NURSE SPECIALIST

## 2023-02-13 PROCEDURE — 80053 COMPREHEN METABOLIC PANEL: CPT | Performed by: STUDENT IN AN ORGANIZED HEALTH CARE EDUCATION/TRAINING PROGRAM

## 2023-02-13 PROCEDURE — 99223 PR INITIAL HOSPITAL CARE,LEVL III: ICD-10-PCS | Mod: ,,, | Performed by: CLINICAL NURSE SPECIALIST

## 2023-02-13 PROCEDURE — 43242 EGD US FINE NEEDLE BX/ASPIR: CPT | Mod: 51,,, | Performed by: INTERNAL MEDICINE

## 2023-02-13 PROCEDURE — 27201674 HC SPHINCTERTOME: Performed by: INTERNAL MEDICINE

## 2023-02-13 DEVICE — STENT SYSTEM
Type: IMPLANTABLE DEVICE | Site: BILE DUCT | Status: FUNCTIONAL
Brand: WALLFLEX™ BILIARY

## 2023-02-13 RX ORDER — TRAVOPROST 0.04 MG/ML
1 SOLUTION/ DROPS OPHTHALMIC NIGHTLY
COMMUNITY
Start: 2023-02-02

## 2023-02-13 RX ORDER — LIDOCAINE HYDROCHLORIDE 20 MG/ML
INJECTION, SOLUTION EPIDURAL; INFILTRATION; INTRACAUDAL; PERINEURAL
Status: DISCONTINUED | OUTPATIENT
Start: 2023-02-13 | End: 2023-02-13

## 2023-02-13 RX ORDER — SUCCINYLCHOLINE CHLORIDE 20 MG/ML
INJECTION INTRAMUSCULAR; INTRAVENOUS
Status: DISCONTINUED | OUTPATIENT
Start: 2023-02-13 | End: 2023-02-13

## 2023-02-13 RX ORDER — INDOMETHACIN 50 MG/1
SUPPOSITORY RECTAL
Status: COMPLETED | OUTPATIENT
Start: 2023-02-13 | End: 2023-02-13

## 2023-02-13 RX ORDER — POTASSIUM CHLORIDE 7.45 MG/ML
10 INJECTION INTRAVENOUS
Status: DISPENSED | OUTPATIENT
Start: 2023-02-13 | End: 2023-02-13

## 2023-02-13 RX ORDER — KETAMINE HCL IN 0.9 % NACL 50 MG/5 ML
SYRINGE (ML) INTRAVENOUS
Status: DISCONTINUED | OUTPATIENT
Start: 2023-02-13 | End: 2023-02-13

## 2023-02-13 RX ORDER — ONDANSETRON 2 MG/ML
4 INJECTION INTRAMUSCULAR; INTRAVENOUS ONCE AS NEEDED
Status: CANCELLED | OUTPATIENT
Start: 2023-02-13 | End: 2034-07-12

## 2023-02-13 RX ORDER — ONDANSETRON 2 MG/ML
INJECTION INTRAMUSCULAR; INTRAVENOUS
Status: DISCONTINUED | OUTPATIENT
Start: 2023-02-13 | End: 2023-02-13

## 2023-02-13 RX ORDER — NOREPINEPHRINE BITARTRATE 1 MG/ML
INJECTION, SOLUTION INTRAVENOUS CONTINUOUS PRN
Status: DISCONTINUED | OUTPATIENT
Start: 2023-02-13 | End: 2023-02-13

## 2023-02-13 RX ORDER — PROPOFOL 10 MG/ML
VIAL (ML) INTRAVENOUS
Status: DISCONTINUED | OUTPATIENT
Start: 2023-02-13 | End: 2023-02-13

## 2023-02-13 RX ORDER — POTASSIUM CHLORIDE 7.45 MG/ML
10 INJECTION INTRAVENOUS
Status: COMPLETED | OUTPATIENT
Start: 2023-02-13 | End: 2023-02-13

## 2023-02-13 RX ORDER — NEOSTIGMINE METHYLSULFATE 0.5 MG/ML
INJECTION, SOLUTION INTRAVENOUS
Status: DISCONTINUED | OUTPATIENT
Start: 2023-02-13 | End: 2023-02-13

## 2023-02-13 RX ORDER — NOREPINEPHRINE BITARTRATE/D5W 4MG/250ML
PLASTIC BAG, INJECTION (ML) INTRAVENOUS
Status: COMPLETED
Start: 2023-02-13 | End: 2023-02-13

## 2023-02-13 RX ORDER — POTASSIUM CHLORIDE 7.45 MG/ML
20 INJECTION INTRAVENOUS ONCE
Status: CANCELLED | OUTPATIENT
Start: 2023-02-13 | End: 2023-02-13

## 2023-02-13 RX ORDER — ROCURONIUM BROMIDE 10 MG/ML
INJECTION, SOLUTION INTRAVENOUS
Status: DISCONTINUED | OUTPATIENT
Start: 2023-02-13 | End: 2023-02-13

## 2023-02-13 RX ORDER — FLUTICASONE PROPIONATE AND SALMETEROL 50; 250 UG/1; UG/1
1 POWDER RESPIRATORY (INHALATION) 2 TIMES DAILY
COMMUNITY
Start: 2023-02-03

## 2023-02-13 RX ADMIN — GLYCOPYRROLATE 0.2 MG: 0.2 INJECTION, SOLUTION INTRAMUSCULAR; INTRAVENOUS at 04:02

## 2023-02-13 RX ADMIN — BRIMONIDINE TARTRATE 1 DROP: 2 SOLUTION OPHTHALMIC at 09:02

## 2023-02-13 RX ADMIN — PIPERACILLIN SODIUM AND TAZOBACTAM SODIUM 4.5 G: 4; .5 INJECTION, POWDER, FOR SOLUTION INTRAVENOUS at 01:02

## 2023-02-13 RX ADMIN — LIDOCAINE HYDROCHLORIDE 80 MG: 20 INJECTION, SOLUTION EPIDURAL; INFILTRATION; INTRACAUDAL; PERINEURAL at 03:02

## 2023-02-13 RX ADMIN — HEPARIN SODIUM 5000 UNITS: 5000 INJECTION INTRAVENOUS; SUBCUTANEOUS at 09:02

## 2023-02-13 RX ADMIN — SUCCINYLCHOLINE CHLORIDE 100 MG: 20 INJECTION, SOLUTION INTRAMUSCULAR; INTRAVENOUS at 03:02

## 2023-02-13 RX ADMIN — PIPERACILLIN SODIUM AND TAZOBACTAM SODIUM 4.5 G: 4; .5 INJECTION, POWDER, FOR SOLUTION INTRAVENOUS at 06:02

## 2023-02-13 RX ADMIN — POTASSIUM CHLORIDE 10 MEQ: 7.46 INJECTION, SOLUTION INTRAVENOUS at 05:02

## 2023-02-13 RX ADMIN — POTASSIUM CHLORIDE 10 MEQ: 7.46 INJECTION, SOLUTION INTRAVENOUS at 06:02

## 2023-02-13 RX ADMIN — PIPERACILLIN SODIUM AND TAZOBACTAM SODIUM 4.5 G: 4; .5 INJECTION, POWDER, FOR SOLUTION INTRAVENOUS at 10:02

## 2023-02-13 RX ADMIN — Medication 20 MG: at 03:02

## 2023-02-13 RX ADMIN — ONDANSETRON 4 MG: 2 INJECTION INTRAMUSCULAR; INTRAVENOUS at 03:02

## 2023-02-13 RX ADMIN — PROPOFOL 50 MG: 10 INJECTION, EMULSION INTRAVENOUS at 03:02

## 2023-02-13 RX ADMIN — SODIUM CHLORIDE: 0.9 INJECTION, SOLUTION INTRAVENOUS at 02:02

## 2023-02-13 RX ADMIN — NEOSTIGMINE METHYLSULFATE 3 MG: 0.5 INJECTION, SOLUTION INTRAVENOUS at 04:02

## 2023-02-13 RX ADMIN — VANCOMYCIN HYDROCHLORIDE 1500 MG: 1.5 INJECTION, POWDER, LYOPHILIZED, FOR SOLUTION INTRAVENOUS at 05:02

## 2023-02-13 RX ADMIN — NOREPINEPHRINE BITARTRATE 0.02 MG: 4 INJECTION, SOLUTION INTRAVENOUS at 02:02

## 2023-02-13 RX ADMIN — ROCURONIUM BROMIDE 20 MG: 10 INJECTION INTRAVENOUS at 03:02

## 2023-02-13 RX ADMIN — LEVETIRACETAM 500 MG: 500 TABLET, FILM COATED ORAL at 08:02

## 2023-02-13 RX ADMIN — NOREPINEPHRINE BITARTRATE 0.04 MCG/KG/MIN: 1 INJECTION, SOLUTION INTRAVENOUS at 02:02

## 2023-02-13 RX ADMIN — POTASSIUM CHLORIDE 10 MEQ: 7.46 INJECTION, SOLUTION INTRAVENOUS at 02:02

## 2023-02-13 NOTE — TRANSFER OF CARE
"Anesthesia Transfer of Care Note    Patient: Anthony Borges    Procedure(s) Performed: Procedure(s) (LRB):  ERCP (ENDOSCOPIC RETROGRADE CHOLANGIOPANCREATOGRAPHY) (N/A)  ULTRASOUND, UPPER GI TRACT, ENDOSCOPIC (N/A)    Patient location: ICU    Anesthesia Type: general    Transport from OR: Transported from OR on 6-10 L/min O2 by face mask with adequate spontaneous ventilation    Post pain: adequate analgesia    Post assessment: no apparent anesthetic complications and tolerated procedure well    Post vital signs: stable    Level of consciousness: awake, alert and oriented    Nausea/Vomiting: no nausea/vomiting    Complications: none    Transfer of care protocol was followed      Last vitals:   Visit Vitals  /61 (BP Location: Right arm, Patient Position: Lying)   Pulse (!) 113   Temp 36.8 °C (98.2 °F) (Oral)   Resp 14   Ht 6' 1" (1.854 m)   Wt 92.1 kg (203 lb)   SpO2 97%   BMI 26.78 kg/m²     "

## 2023-02-13 NOTE — PROGRESS NOTES
[facility] - Cardiac Medical ICU  Wound Care    Patient Name:  Anthony Borges   MRN:  [id number]  Date: 2/13/2023  Diagnosis: Pancreatic mass    History:     Past Medical History:   Diagnosis Date    Alcohol abuse     Aphasia     COPD (chronic obstructive pulmonary disease)     GERD (gastroesophageal reflux disease)     Hypertension     Hypokalemia     Opioid abuse     Stroke     Unspecified glaucoma        Social History     Socioeconomic History    Marital status: Unknown   Tobacco Use    Smoking status: Never    Smokeless tobacco: Never   Substance and Sexual Activity    Drug use: Not Currently       Precautions:     Allergies as of 02/11/2023    (No Known Allergies)       WOC Assessment Details/Treatment        02/13/23 1422   WOCN Assessment   WOCN Total Time (mins) 45   Visit Date 02/13/23   Visit Time 1420   Consult Type New   WOCN Speciality Wound   Intervention assessed;applied;chart review;coordination of care;orders   Teaching on-going   Pressure Injury Prevention    Check Moisture Management Pad Done        Altered Skin Integrity 02/12/23 0515 posterior Sacral spine #1   Date First Assessed/Time First Assessed: 02/12/23 0515   Altered Skin Integrity Present on Admission: yes  Orientation: posterior  Location: Sacral spine  Wound Number: #1  Is this injury device related?: No   Wound Image    Dressing Appearance Dry;Intact   Drainage Amount None   Appearance Pink   Tissue loss description Full thickness   Red (%), Wound Tissue Color 25 %   Yellow (%), Wound Tissue Color 75 %   Periwound Area   (discolored)   Wound Edges Irregular   Wound Length (cm) 6 cm   Wound Width (cm) 8 cm   Wound Depth (cm) 0.2 cm   Wound Volume (cm^3) 9.6 cm^3   Wound Surface Area (cm^2) 48 cm^2   Care Cleansed with:;Soap and water;Applied:  ([facility])     Wound consult completed for sacrum that was present on admission.  Incontinent of bowels.  Large bowel movement noted.  Yara-care done.  Able to make some needs known.  Primary nurse at  bedside.  Recommendations made to primary team for [facility] to area.  Orders placed.     02/13/2023

## 2023-02-13 NOTE — PROGRESS NOTES
Pharmacokinetic Assessment Follow Up: IV Vancomycin    Vancomycin serum concentration assessment(s):    Vancomycin trough level resulted at 24.5 mcg/mL, drawn appropriately and next dose administered. Goal level is 10 to 20 mcg/mL.     Drug levels (last 3 results):  Recent Labs   Lab Result Units 02/13/23  0506   Vancomycin-Trough ug/mL 24.5*     Vancomycin Regimen Plan:    Discontinue the scheduled vancomycin regimen and re-dose when the random level is less than 20 mcg/mL. Next level to be drawn with morning labs on 2/14, approximately 24 hours after the previous dose.     Pharmacy will continue to follow and monitor vancomycin.    Please contact pharmacy at extension [phone] for questions regarding this assessment.    Thank you for the consult,   Tiarra Ortiz, PharmD, BCCCP               Patient brief summary:  Anthony Borges is a 62 y.o. male initiated on antimicrobial therapy with IV vancomycin for treatment of sepsis    Drug Allergies:   Review of patient's allergies indicates:  No Known Allergies    Actual Body Weight:   92.1 kg     Renal Function:   Estimated Creatinine Clearance: 175.3 mL/min (based on SCr of 0.5 mg/dL).    Dialysis Method (if applicable):  N/A    CBC (last 72 hours):  Recent Labs   Lab Result Units 02/11/23  1510 02/12/23  0359 02/13/23  0506   WBC K/uL 18.61* 30.27* 19.89*   Hemoglobin g/dL 11.6* 10.4* 9.6*   Hematocrit % 37.4* 32.2* 29.8*   Platelets K/uL 376 352 378   Gran % % 84.0* 84.1* 82.2*   Lymph % % 6.0* 5.9* 8.2*   Mono % % 9.1 8.8 8.2   Eosinophil % % 0.1 0.0 0.2   Basophil % % 0.2 0.2 0.4   Differential Method  Automated Automated Automated       Metabolic Panel (last 72 hours):  Recent Labs   Lab Result Units 02/11/23  1510 02/11/23  1631 02/12/23  0421 02/12/23  0537 02/13/23  0506   Sodium mmol/L 124*  --   --  127* 132*   Sodium, Urine mmol/L  --   --  18*  --   --    Potassium mmol/L 2.8*  --   --  4.3 3.1*   Chloride mmol/L 84*  --   --  90* 91*   CO2 mmol/L 24  --    --  22* 26   Glucose mg/dL 135*  --   --  103 121*   Glucose, UA   --  Negative  --   --   --    BUN mg/dL 10  --   --  8 5*   Creatinine mg/dL 0.6  --   --  0.6 0.5   Albumin g/dL 2.2*  --   --  1.9* 1.8*   Total Bilirubin mg/dL 29.3*  --   --  27.3* 27.4*   Alkaline Phosphatase U/L 1,621*  --   --  1,231* 1,197*   AST U/L 448*  --   --  357* 306*   ALT U/L 393*  --   --  325* 297*   Magnesium mg/dL  --   --   --  1.2* 1.6   Phosphorus mg/dL  --   --   --  2.3* 2.7       Vancomycin Administrations:  vancomycin given in the last 96 hours                     vancomycin 1,500 mg in dextrose 5 % (D5W) 250 mL IVPB (Vial-Mate) (mg) 1,500 mg New Bag 02/13/23 0555     1,500 mg New Bag 02/12/23 1838    vancomycin 1.75 g in 5 % dextrose 500 mL IVPB ()  Restarted 02/12/23 0752      Restarted  0727      Restarted  0722     1,750 mg New Bag  0545    vancomycin 1.75 g in 5 % dextrose 500 mL IVPB (mg) 1,750 mg New Bag 02/11/23 1827                    Microbiologic Results:  Microbiology Results (last 7 days)       Procedure Component Value Units Date/Time    Blood culture #1 **CANNOT BE ORDERED STAT** [[id number]] Collected: 02/11/23 1519    Order Status: Completed Specimen: Blood from Peripheral, Hand, Left Updated: 02/12/23 1612     Blood Culture, Routine No Growth to date      No Growth to date    Blood culture #2 **CANNOT BE ORDERED STAT** [[id number]] Collected: 02/11/23 1519    Order Status: Completed Specimen: Blood from Peripheral, Upper Arm, Left Updated: 02/12/23 1612     Blood Culture, Routine No Growth to date      No Growth to date

## 2023-02-13 NOTE — SUBJECTIVE & OBJECTIVE
Interval History/Significant Events: No acute events overnight    Review of Systems   Unable to perform ROS: Mental status change   Objective:     Vital Signs (Most Recent):  Temp: 98 °F (36.7 °C) (02/13/23 0705)  Pulse: (!) 112 (02/13/23 0800)  Resp: 13 (02/13/23 0800)  BP: (!) 95/57 (02/13/23 0800)  SpO2: (!) 94 % (02/13/23 0800)   Vital Signs (24h Range):  Temp:  [98 °F (36.7 °C)-98.3 °F (36.8 °C)] 98 °F (36.7 °C)  Pulse:  [] 112  Resp:  [13-22] 13  SpO2:  [91 %-99 %] 94 %  BP: ()/(50-68) 95/57   Weight: 92.1 kg (203 lb)  Body mass index is 26.78 kg/m².      Intake/Output Summary (Last 24 hours) at 2/13/2023 0836  Last data filed at 2/13/2023 0800  Gross per 24 hour   Intake 1532.85 ml   Output 2610 ml   Net -1077.15 ml       Physical Exam  Vitals and nursing note reviewed.   Constitutional:       Appearance: He is cachectic. He is ill-appearing.   HENT:      Mouth/Throat:      Mouth: Mucous membranes are moist.   Eyes:      Extraocular Movements: Extraocular movements intact.      Pupils: Pupils are equal, round, and reactive to light.   Cardiovascular:      Pulses: Normal pulses.   Pulmonary:      Effort: Pulmonary effort is normal. No respiratory distress.   Abdominal:      Palpations: Abdomen is soft.      Tenderness: There is no abdominal tenderness. There is no guarding or rebound.   Musculoskeletal:      Right lower leg: Edema present.      Left lower leg: Edema present.      Comments: Chronic R sided contractures    Skin:     General: Skin is dry.      Coloration: Skin is jaundiced.   Neurological:      Mental Status: He is alert and oriented to person, place, and time. He is confused.      GCS: GCS eye subscore is 4. GCS verbal subscore is 4. GCS motor subscore is 6.      Cranial Nerves: No facial asymmetry.      Comments: Asks questions repeatedly. Has some understanding of his care but occasionally will have muffled speech    Psychiatric:         Mood and Affect: Mood normal.       Vents:      Lines/Drains/Airways       Drain  Duration                  Urethral Catheter 02/11/23 1624 Straight-tip 1 day              Peripheral Intravenous Line  Duration                  Peripheral IV - Single Lumen 02/12/23 1200 22 G Left;Medial Other <1 day         Peripheral IV - Single Lumen 02/12/23 1255 20 G Distal;Left;Posterior Forearm <1 day                  Significant Labs:    CBC/Anemia Profile:  Recent Labs   Lab 02/11/23  1510 02/11/23  1531 02/11/23  2147 02/12/23  0359 02/13/23  0506   WBC 18.61*  --   --  30.27* 19.89*   HGB 11.6*  --   --  10.4* 9.6*   HCT 37.4*   < > 37 32.2* 29.8*     --   --  352 378   MCV 69*  --   --  69* 67*   RDW 22.3*  --   --  22.1* 20.9*    < > = values in this interval not displayed.        Chemistries:  Recent Labs   Lab 02/11/23  1510 02/12/23  0537 02/13/23  0506   * 127* 132*   K 2.8* 4.3 3.1*   CL 84* 90* 91*   CO2 24 22* 26   BUN 10 8 5*   CREATININE 0.6 0.6 0.5   CALCIUM 9.3 8.3* 8.2*   ALBUMIN 2.2* 1.9* 1.8*   PROT 7.2 6.1 5.3*   BILITOT 29.3* 27.3* 27.4*   ALKPHOS 1,621* 1,231* 1,197*   * 325* 297*   * 357* 306*   MG  --  1.2* 1.6   PHOS  --  2.3* 2.7       All pertinent labs within the past 24 hours have been reviewed.    Significant Imaging:  I have reviewed all pertinent imaging results/findings within the past 24 hours.

## 2023-02-13 NOTE — ANESTHESIA POSTPROCEDURE EVALUATION
Anesthesia Post Evaluation    Patient: Anthony Borges    Procedure(s) Performed: Procedure(s) (LRB):  ERCP (ENDOSCOPIC RETROGRADE CHOLANGIOPANCREATOGRAPHY) (N/A)  ULTRASOUND, UPPER GI TRACT, ENDOSCOPIC (N/A)    Final Anesthesia Type: general      Patient location during evaluation: PACU  Patient participation: Yes- Able to Participate  Level of consciousness: awake and alert and oriented  Post-procedure vital signs: reviewed and stable  Pain management: adequate  Airway patency: patent    PONV status at discharge: No PONV  Anesthetic complications: no      Cardiovascular status: hemodynamically stable  Respiratory status: unassisted, spontaneous ventilation and room air  Hydration status: euvolemic  Follow-up not needed.          Vitals Value Taken Time   /67 02/13/23 1722   Pulse 121 02/13/23 1724   Resp 16 02/13/23 1724   SpO2 95 % 02/13/23 1724   Vitals shown include unvalidated device data.      No case tracking events are documented in the log.      Pain/Lisa Score: No data recorded

## 2023-02-13 NOTE — PLAN OF CARE
[facility] - Cardiac Medical ICU  Initial Discharge Assessment       Primary Care Provider: Bean Pearce MD    Admission Diagnosis: Shortness of breath [R06.02]  Pancreatic mass [K86.89]  Chest pain [R07.9]  Acute liver failure without hepatic coma [K72.00]  Sepsis, due to unspecified organism, unspecified whether acute organ dysfunction present [A41.9]    Admission Date: 2/11/2023  Expected Discharge Date: 2/15/2023    Discharge Barriers Identified: Underinsured    Payor: MEDICAID / Plan: [place]ICA[place] / Product Type: Government /     Extended Emergency Contact Information  Primary Emergency Contact: Dalia Omalley   [facility]  Home Phone: [phone]  Relation: Sister  Secondary Emergency Contact: Kelly Borges  Address: [address] [place] [address] [place]  Mobile Phone: [phone]  Relation: Daughter    Discharge Plan A: Return to nursing home  Discharge Plan B: Return to Nursing Home    No Pharmacies Listed      Transferred from:     Past Medical History:   Diagnosis Date    Alcohol abuse     Aphasia     COPD (chronic obstructive pulmonary disease)     GERD (gastroesophageal reflux disease)     Hypertension     Hypokalemia     Opioid abuse     Stroke     Unspecified glaucoma          CM spoke with Dalia jay) [phone] via phone for Discharge Planning Assessment.  Patient was unable to answer questions due to altered mental status and confusion.  Per Dalia, patient is a current resident at [facility] for past ten years.   Per Dalia, patient was dependent with ADLS and is bed bound. Per Dalia, patient is not on dialysis and does not take Coumadin. Patient will return to [facility] and have help from Dalia jay) [phone]  upon discharge.   Discharge Planning Booklet  left in patient's room by PARRISH. Discharge Planning discussed with Dalia jay) [phone] via  phone.  All questions addressed.  CM will follow for needs.      Initial Assessment (most recent)       Adult Discharge Assessment - 02/13/23 1711          Discharge Assessment    Assessment Type Discharge Planning Assessment     Confirmed/corrected address, phone number and insurance Yes     Confirmed Demographics Correct on Facesheet     Source of Information family     When was your last doctors appointment? --   unknown - facility resident    Communicated PAT with patient/caregiver Date not available/Unable to determine     Reason For Admission Pancreatic mass     People in Home facility resident     Facility Arrived From: [facility]     Do you expect to return to your current living situation? Yes     Do you have help at home or someone to help you manage your care at home? Yes     Who are your caregiver(s) and their phone number(s)? Staff at [facility]     Prior to hospitilization cognitive status: Unable to Assess   altered mental status and confusion    Current cognitive status: Unable to Assess   altered mental status and confusion    Walking or Climbing Stairs ambulation difficulty, dependent     Mobility Management patient is bed bound     Dressing/Bathing dressing difficulty, dependent;bathing difficulty, dependent     Dressing/Bathing Management Staff at [facility] assists in these tasks     Equipment Currently Used at Home none     Readmission within 30 days? No     Patient currently being followed by outpatient case management? No     Do you currently have service(s) that help you manage your care at home? No     Do you take prescription medications? Yes     Do you have prescription coverage? Yes     Coverage MEDICA[place] - [place]     Do you have any problems affording any of your prescribed medications? No     Is the patient taking medications as prescribed? yes     Who is going to help you get home at discharge? Patient will return to [facility] upon discharge     How do you get to doctors appointments? other (see comments)   facility resident    Are you on dialysis? No     Do you take coumadin? No     Discharge Plan A Return to nursing home     Discharge Plan B Return to Nursing Home     DME Needed Upon Discharge  none     Discharge Plan discussed with: Sibling     Name(s) and Number(s) Dalia Omalley (sister) [phone]     Discharge Barriers Identified Underinsured        Physical Activity    On average, how many days per week do you engage in moderate to strenuous exercise (like a brisk walk)? --   n/a facility resident - altered mental status and confusion    On average, how many minutes do you engage in exercise at this level? --   n/a facility resident - altered mental status and confusion       Financial Resource Strain    How hard is it for you to pay for the very basics like food, housing, medical care, and heating? --   n/a facility resident - altered mental status and confusion       Housing Stability    In the last 12 months, was there a time when you were not able to pay the mortgage or rent on time? --   n/a facility resident - altered mental status and confusion    In the last 12 months, how many places have you lived? 1     In the last 12 months, was there a time when you did not have a steady place to sleep or slept in a shelter (including now)? --   n/a facility resident - altered mental status and confusion       Transportation Needs    In the past 12 months, has lack of transportation kept you from medical appointments or from getting medications? --   n/a facility resident - altered mental status and confusion    In the past 12 months, has lack of transportation kept you from meetings, work, or from getting things needed for daily living? --   n/a facility resident - altered mental status and confusion       Food Insecurity    Within the past 12 months, you worried that your food would run out before you got the money to  buy more. --   n/a facility resident - altered mental status and confusion    Within the past 12 months, the food you bought just didn't last and you didn't have money to get more. --   n/a facility resident - altered mental status and confusion       Stress    Do you feel stress - tense, restless, nervous, or anxious, or unable to sleep at night because your mind is troubled all the time - these days? --   n/a facility resident - altered mental status and confusion       Social Connections    In a typical week, how many times do you talk on the phone with family, friends, or neighbors? --   n/a facility resident - altered mental status and confusion    How often do you get together with friends or relatives? --   n/a facility resident - altered mental status and confusion    How often do you attend [employer] or [employer]an services? --   n/a facility resident - altered mental status and confusion    Do you belong to any clubs or organizations such as [employer] groups, unions, fraternal or athletic groups, or school groups? --   n/a facility resident - altered mental status and confusion    How often do you attend meetings of the clubs or organizations you belong to? --   n/a facility resident - altered mental status and confusion    Are you , , , , never , or living with a partner? --   n/a facility resident - altered mental status and confusion       Alcohol Use    Q1: How often do you have a drink containing alcohol? --   n/a facility resident - altered mental status and confusion    Q2: How many drinks containing alcohol do you have on a typical day when you are drinking? --   n/a facility resident - altered mental status and confusion    Q3: How often do you have six or more drinks on one occasion? --   n/a facility resident - altered mental status and confusion       OTHER    Name(s) of People in Home Resident at [facility]                                PCP:  Bean Pearce MD  [phone]        Pharmacy:  No Pharmacies Listed      Emergency Contacts:  Extended Emergency Contact Information  Primary Emergency Contact: Richy Omalleyha   [facility] [place]  Home Phone: [phone]  Relation: Sister  Secondary Emergency Contact: Kelly Borges  Address: [address] [place] [address]  Mobile Phone: [phone]  Relation: Daughter      Insurance:    Payor: MEDICAID / Plan: [place] / Product Type: Government /     Elise Arndt RN     [phone]      02/13/2023  5:26 PM

## 2023-02-13 NOTE — ANESTHESIA PREPROCEDURE EVALUATION
[facility]  Anesthesia Pre-Operative Evaluation       Patient Name: Anthony Borges  YOB: 1960  MRN: [id number]  [facility]: [id number]      Code Status: DNR   Date of Procedure: 2/13/2023  Anesthesia: General/MAC Procedure: Procedure(s) (LRB):  ERCP (ENDOSCOPIC RETROGRADE CHOLANGIOPANCREATOGRAPHY) (N/A)  ULTRASOUND, UPPER GI TRACT, ENDOSCOPIC (N/A)  Pre-Operative Diagnosis: Pancreatic mass [K86.89]  Sepsis, due to unspecified organism, unspecified whether acute organ dysfunction present [A41.9]  Proceduralist: Surgeon(s) and Role:     * Isadora Chang MD - Primary        SUBJECTIVE:   Anthony Borges is a 62 y.o. male who  has a past medical history of Alcohol abuse, Aphasia, COPD (chronic obstructive pulmonary disease), GERD (gastroesophageal reflux disease), Hypertension, Hypokalemia, Opioid abuse, Stroke, and Unspecified glaucoma. Mr. 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   CT abd/pelvis was obtained and showed a large mass at the head of the pancreas with severe biliary ductal dilation. He also has prominent intra-abdominal lymph nodes and left lower lobe collapse with pleural effusion. CA 19-9 was >42883. He was admitted to the MICU for septic shock and has since been weaned from low dose of norepinephrine.       * No LDAs found *      Anticoagulants   Medication Route Frequency    heparin (porcine) injection 5,000 Units Subcutaneous Q8H     he has a current medication list which includes the following long-term medication(s): aluminum-magnesium hydroxide-simethicone, brimonidine 0.2%, bumetanide, metoprolol tartrate, and travoprost (benzalkonium).   ALLERGIES:   Review of patient's allergies indicates:  No Known Allergies  LDA:      Lines/Drains/Airways     Drain  Duration                Urethral Catheter 02/11/23 1124  Straight-tip 1 day          Peripheral Intravenous Line  Duration                Peripheral IV - Single Lumen 02/12/23 1200 22 G Left;Medial Other <1 day         Peripheral IV - Single Lumen 02/12/23 1255 20 G Distal;Left;Posterior Forearm <1 day              MEDICATIONS:     Antibiotics (From admission, onward)    Start     Stop Route Frequency Ordered    02/12/23 0200  piperacillin-tazobactam (ZOSYN) 4.5 g in dextrose 5 % in water (D5W) 5 % 100 mL IVPB (MB+)         -- IV Every 8 hours (non-standard times) 02/12/23 0058    02/12/23 0158  vancomycin - pharmacy to dose  (vancomycin IVPB)        See \[facility]\""pace for full Linked Orders Report.    -- IV pharmacy to manage frequency 02/12/23 0058        VTE Risk Mitigation (From admission, onward)         Ordered     heparin (porcine) injection 5,000 Units  Every 8 hours         02/12/23 0528     IP VTE LOW RISK PATIENT  Once         02/12/23 0110     Place sequential compression device  Until discontinued         02/12/23 0110              Current Facility-Administered Medications   Medication Dose Route Frequency Provider Last Rate Last Admin    albuterol inhaler 2 puff  2 puff Inhalation Q4H PRN Cipriano Shoaib Vic, DO        aluminum-magnesium hydroxide-simethicone 200-200-20 mg/5 mL suspension 30 mL  30 mL Oral Q4H PRN Cipriano Shoaib Vic, DO        ascorbic acid (vitamin C) tablet 500 mg  500 mg Oral Daily Cipriano Shoaib Vic, DO   500 mg at 02/12/23 0805    brimonidine 0.2% ophthalmic solution 1 drop  1 drop Both Eyes BID Cipriano Shoaib Vic, DO   1 drop at 02/12/23 2036    dextrose 10% bolus 125 mL 125 mL  12.5 g Intravenous PRN Cipirano Shoaib Vic, DO        dextrose 10% bolus 250 mL 250 mL  25 g Intravenous PRN Cipriano Shoaib Vic, DO        [START ON 2/16/2023] ergocalciferol capsule 50,000 Units  50,000 Units Oral Every Thurs Cipriano Shoaib Ivc, DO        famotidine tablet 20 mg  20 mg Oral Daily Cipriano Shoaib Vic, DO   20 mg at 02/12/23 0805    glucagon (human  recombinant) injection 1 mg  1 mg Intramuscular PRN Cipriano Shoaib Vic, DO        glucose chewable tablet 16 g  16 g Oral PRN Cipriano Shoaib Vic, DO        glucose chewable tablet 24 g  24 g Oral PRN Cipriano Shoaib Vic, DO        heparin (porcine) injection 5,000 Units  5,000 Units Subcutaneous Q8H Khadra Cavanaugh MD   5,000 Units at 02/12/23 0546    insulin aspart U-100 pen 0-5 Units  0-5 Units Subcutaneous QID (AC + HS) PRN Cipriano Shoaib Vic, DO        levETIRAcetam tablet 500 mg  500 mg Oral BID Cipriano Shoaib Vic, DO   500 mg at 02/12/23 2036    naloxone 0.4 mg/mL injection 0.02 mg  0.02 mg Intravenous PRN Cipriano Shoaib Vic, DO        piperacillin-tazobactam (ZOSYN) 4.5 g in dextrose 5 % in water (D5W) 5 % 100 mL IVPB (MB+)  4.5 g Intravenous Q8H Cipriano Shoaib Vic, DO   Stopped at 02/13/23 0559    potassium chloride 10 mEq in 100 mL IVPB  10 mEq Intravenous Q1H Khadra Cavanaugh MD 50 mL/hr at 02/13/23 0800 Rate Verify at 02/13/23 0800    sodium chloride 0.9% flush 10 mL  10 mL Intravenous Q12H PRN Cipriano Shoaib Vic, DO        vancomycin - pharmacy to dose   Intravenous pharmacy to manage frequency Cipriano Shoaib Vic, DO              History:     Active Hospital Problems    Diagnosis  POA    *Pancreatic mass [K86.89]  Yes    Transaminitis [R74.01]  Unknown    Elevated CA 19-9 level [R97.8]  Unknown    Hyperammonemia [E72.20]  Unknown    Acute encephalopathy [G93.40]  Unknown    Hyponatremia [E87.1]  Yes    Sepsis [A41.9]  Unknown    History of CVA (cerebrovascular accident) [Z86.73]  Not Applicable    Chronic obstructive lung disease [J44.9]  Yes    Gastroesophageal reflux disease [K21.9]  Yes    Hyperlipidemia [E78.5]  Yes      Resolved Hospital Problems    Diagnosis Date Resolved POA    Cholelithiasis [K80.20] 02/12/2023 Unknown    Alkaline phosphatase elevation [R74.8] 02/12/2023 Unknown    Altered mental state [R41.82] 02/12/2023 Unknown     Surgical History:    has no past surgical  history on file.   Social History:    has no history on file for sexual activity.  reports that he has never smoked. He has never used smokeless tobacco. He reports that he does not currently use drugs.     OBJECTIVE:     Vital Signs (Most Recent):  Temp: 36.7 °C (98 °F) (02/13/23 0705)  Pulse: (!) 112 (02/13/23 0800)  Resp: 13 (02/13/23 0800)  BP: (!) 95/57 (02/13/23 0800)  SpO2: (!) 94 % (02/13/23 0800) Vital Signs Range (Last 24H):  Temp:  [36.7 °C (98 °F)-36.8 °C (98.3 °F)]   Pulse:  []   Resp:  [13-22]   BP: ()/(50-68)   SpO2:  [91 %-99 %]        Body mass index is 26.78 kg/m².   Wt Readings from Last 4 Encounters:   02/11/23 92.1 kg (203 lb)   05/13/22 92.1 kg (203 lb)   08/06/21 97.5 kg (215 lb)     Significant Labs:  Lab Results   Component Value Date    WBC 19.89 (H) 02/13/2023    HGB 9.6 (L) 02/13/2023    HCT 29.8 (L) 02/13/2023     02/13/2023     (L) 02/13/2023    K 3.1 (L) 02/13/2023    CL 91 (L) 02/13/2023    CREATININE 0.5 02/13/2023    BUN 5 (L) 02/13/2023    CO2 26 02/13/2023     (H) 02/13/2023    CALCIUM 8.2 (L) 02/13/2023    MG 1.6 02/13/2023    PHOS 2.7 02/13/2023    ALKPHOS 1,197 (H) 02/13/2023     (H) 02/13/2023     (H) 02/13/2023    ALBUMIN 1.8 (L) 02/13/2023    INR 1.1 11/14/2014    APTT 27.2 11/14/2014    CPK 69 08/06/2021    TROPONINI <0.006 02/11/2023    BNP 29 02/11/2023     No LMP for male patient.  Recent Results (from the past 72 hour(s))   CBC auto differential    Collection Time: 02/11/23  3:10 PM   Result Value Ref Range    WBC 18.61 (H) 3.90 - 12.70 K/uL    RBC 5.41 4.60 - 6.20 M/uL    Hemoglobin 11.6 (L) 14.0 - 18.0 g/dL    Hematocrit 37.4 (L) 40.0 - 54.0 %    MCV 69 (L) 82 - 98 fL    MCH 21.4 (L) 27.0 - 31.0 pg    MCHC 31.0 (L) 32.0 - 36.0 g/dL    RDW 22.3 (H) 11.5 - 14.5 %    Platelets 376 150 - 450 K/uL    MPV 11.6 9.2 - 12.9 fL    Immature Granulocytes 0.6 (H) 0.0 - 0.5 %    Gran # (ANC) 15.6 (H) 1.8 - 7.7 K/uL    Immature Grans  (Abs) 0.12 (H) 0.00 - 0.04 K/uL    Lymph # 1.1 1.0 - 4.8 K/uL    Mono # 1.7 (H) 0.3 - 1.0 K/uL    Eos # 0.0 0.0 - 0.5 K/uL    Baso # 0.04 0.00 - 0.20 K/uL    nRBC 0 0 /100 WBC    Gran % 84.0 (H) 38.0 - 73.0 %    Lymph % 6.0 (L) 18.0 - 48.0 %    Mono % 9.1 4.0 - 15.0 %    Eosinophil % 0.1 0.0 - 8.0 %    Basophil % 0.2 0.0 - 1.9 %    Differential Method Automated    Comprehensive metabolic panel    Collection Time: 02/11/23  3:10 PM   Result Value Ref Range    Sodium 124 (L) 136 - 145 mmol/L    Potassium 2.8 (L) 3.5 - 5.1 mmol/L    Chloride 84 (L) 95 - 110 mmol/L    CO2 24 23 - 29 mmol/L    Glucose 135 (H) 70 - 110 mg/dL    BUN 10 8 - 23 mg/dL    Creatinine 0.6 0.5 - 1.4 mg/dL    Calcium 9.3 8.7 - 10.5 mg/dL    Total Protein 7.2 6.0 - 8.4 g/dL    Albumin 2.2 (L) 3.5 - 5.2 g/dL    Total Bilirubin 29.3 (H) 0.1 - 1.0 mg/dL    Alkaline Phosphatase 1,621 (H) 55 - 135 U/L     (H) 10 - 40 U/L     (H) 10 - 44 U/L    Anion Gap 16 8 - 16 mmol/L    eGFR >60.0 >60 mL/min/1.73 m^2   Brain natriuretic peptide    Collection Time: 02/11/23  3:10 PM   Result Value Ref Range    BNP 29 0 - 99 pg/mL   Troponin I    Collection Time: 02/11/23  3:10 PM   Result Value Ref Range    Troponin I <0.006 0.000 - 0.026 ng/mL   Lipase    Collection Time: 02/11/23  3:10 PM   Result Value Ref Range    Lipase 45 4 - 60 U/L   Ammonia    Collection Time: 02/11/23  3:10 PM   Result Value Ref Range    Ammonia 67 (H) 10 - 50 umol/L   Hepatitis panel, acute    Collection Time: 02/11/23  3:10 PM   Result Value Ref Range    Hepatitis B Surface Ag Non-reactive Non-reactive    Hep B C IgM Non-reactive Non-reactive    Hep A IgM Non-reactive Non-reactive    Hepatitis C Ab Non-reactive Non-reactive   Acetaminophen level    Collection Time: 02/11/23  3:10 PM   Result Value Ref Range    Acetaminophen (Tylenol), Serum 3.6 (L) 10.0 - 20.0 ug/mL   Cancer Antigen 19-9    Collection Time: 02/11/23  3:10 PM   Result Value Ref Range    CA 19-9 81500.1 (H) 0.0  - 40.0 U/mL   Blood culture #1 **CANNOT BE ORDERED STAT**    Collection Time: 02/11/23  3:19 PM    Specimen: Peripheral, Hand, Left; Blood   Result Value Ref Range    Blood Culture, Routine No Growth to date     Blood Culture, Routine No Growth to date    Blood culture #2 **CANNOT BE ORDERED STAT**    Collection Time: 02/11/23  3:19 PM    Specimen: Peripheral, Upper Arm, Left; Blood   Result Value Ref Range    Blood Culture, Routine No Growth to date     Blood Culture, Routine No Growth to date    Lactic acid, plasma    Collection Time: 02/11/23  3:20 PM   Result Value Ref Range    Lactate (Lactic Acid) 1.1 0.5 - 2.2 mmol/L   ISTAT PROCEDURE    Collection Time: 02/11/23  3:28 PM   Result Value Ref Range    POC PH 7.145 (L) 7.35 - 7.45    POC PCO2 76.0 (H) 35 - 45 mmHg    POC PO2 65 (HH) 40 - 60 mmHg    POC HCO3 26.2 24 - 28 mmol/L    POC BE -3 -2 to 2 mmol/L    POC SATURATED O2 84 (L) 95 - 100 %    POC TCO2 28 24 - 29 mmol/L    Sample VENOUS     Site Other     Allens Test N/A     DelSys Nasal Can    ISTAT PROCEDURE    Collection Time: 02/11/23  3:31 PM   Result Value Ref Range    POC Glucose 141 (H) 70 - 110 mg/dL    POC BUN 9 6 - 30 mg/dL    POC Creatinine 0.5 0.5 - 1.4 mg/dL    POC Sodium 125 (L) 136 - 145 mmol/L    POC Potassium 2.7 (LL) 3.5 - 5.1 mmol/L    POC Chloride 85 (L) 95 - 110 mmol/L    POC TCO2 (MEASURED) 30 (H) 23 - 29 mmol/L    POC Ionized Calcium 0.95 (L) 1.06 - 1.42 mmol/L    POC Hematocrit 41 36 - 54 %PCV    Sample LEVON    SARS-Cov2 (COVID) with FLU/RSV by PCR    Collection Time: 02/11/23  4:12 PM   Result Value Ref Range    SARS-CoV2 (COVID-19) Qualitative PCR Not Detected Not Detected    Influenza A, Molecular Not Detected Not Detected    Influenza B, Molecular Not Detected Not Detected    RSV Ag by Molecular Method Not Detected Not Detected   Urinalysis, Reflex to Urine Culture Urine, Catheterized    Collection Time: 02/11/23  4:31 PM    Specimen: Urine   Result Value Ref Range    Specimen UA  Urine, Catheterized     Color, UA Yellow Yellow, Straw, Nafisa    Appearance, UA Clear Clear    pH, UA 7.0 5.0 - 8.0    Specific Gravity, UA 1.010 1.005 - 1.030    Protein, UA Negative Negative    Glucose, UA Negative Negative    Ketones, UA Negative Negative    Bilirubin (UA) 3+ (A) Negative    Occult Blood UA Negative Negative    Nitrite, UA Negative Negative    Leukocytes, UA Negative Negative   Lactic acid, plasma #2    Collection Time: 02/11/23  7:41 PM   Result Value Ref Range    Lactate (Lactic Acid) 1.1 0.5 - 2.2 mmol/L   ISTAT PROCEDURE    Collection Time: 02/11/23  9:47 PM   Result Value Ref Range    POC PH 7.544 (H) 7.35 - 7.45    POC PCO2 33.1 (L) 35 - 45 mmHg    POC PO2 63 (L) 80 - 100 mmHg    POC HCO3 28.5 (H) 24 - 28 mmol/L    POC BE 6 -2 to 2 mmol/L    POC SATURATED O2 95 95 - 100 %    POC Sodium 127 (L) 136 - 145 mmol/L    POC Potassium 2.3 (LL) 3.5 - 5.1 mmol/L    POC TCO2 30 (H) 23 - 27 mmol/L    POC Ionized Calcium 0.96 (L) 1.06 - 1.42 mmol/L    POC Hematocrit 37 36 - 54 %PCV    Sample ARTERIAL     Site LR     Allens Test Pass     DelSys Room Air     Mode SPONT     FiO2 21    POCT glucose    Collection Time: 02/12/23  2:37 AM   Result Value Ref Range    POCT Glucose 113 (H) 70 - 110 mg/dL   POCT glucose    Collection Time: 02/12/23  2:38 AM   Result Value Ref Range    POC Glucose 113 (A) 70 - 110 MG/DL   CBC with Automated Differential    Collection Time: 02/12/23  3:59 AM   Result Value Ref Range    WBC 30.27 (H) 3.90 - 12.70 K/uL    RBC 4.70 4.60 - 6.20 M/uL    Hemoglobin 10.4 (L) 14.0 - 18.0 g/dL    Hematocrit 32.2 (L) 40.0 - 54.0 %    MCV 69 (L) 82 - 98 fL    MCH 22.1 (L) 27.0 - 31.0 pg    MCHC 32.3 32.0 - 36.0 g/dL    RDW 22.1 (H) 11.5 - 14.5 %    Platelets 352 150 - 450 K/uL    MPV 10.8 9.2 - 12.9 fL    Immature Granulocytes 1.0 (H) 0.0 - 0.5 %    Gran # (ANC) 25.5 (H) 1.8 - 7.7 K/uL    Immature Grans (Abs) 0.29 (H) 0.00 - 0.04 K/uL    Lymph # 1.8 1.0 - 4.8 K/uL    Mono # 2.7 (H) 0.3 - 1.0  K/uL    Eos # 0.0 0.0 - 0.5 K/uL    Baso # 0.05 0.00 - 0.20 K/uL    nRBC 0 0 /100 WBC    Gran % 84.1 (H) 38.0 - 73.0 %    Lymph % 5.9 (L) 18.0 - 48.0 %    Mono % 8.8 4.0 - 15.0 %    Eosinophil % 0.0 0.0 - 8.0 %    Basophil % 0.2 0.0 - 1.9 %    Platelet Estimate Appears normal     Aniso Slight     Poly Occasional     Hypo Moderate     Target Cells Moderate     Differential Method Automated    Osmolality, Serum    Collection Time: 02/12/23  3:59 AM   Result Value Ref Range    Osmolality 273 (L) 280 - 300 mOsm/kg   Sodium, urine, random    Collection Time: 02/12/23  4:21 AM   Result Value Ref Range    Sodium, Urine 18 (L) 20 - 250 mmol/L   Osmolality, urine    Collection Time: 02/12/23  4:21 AM   Result Value Ref Range    Osmolality, Urine 347 50 - 1200 mOsm/kg   POCT glucose    Collection Time: 02/12/23  4:29 AM   Result Value Ref Range    POCT Glucose 97 70 - 110 mg/dL   Comprehensive metabolic panel    Collection Time: 02/12/23  5:37 AM   Result Value Ref Range    Sodium 127 (L) 136 - 145 mmol/L    Potassium 4.3 3.5 - 5.1 mmol/L    Chloride 90 (L) 95 - 110 mmol/L    CO2 22 (L) 23 - 29 mmol/L    Glucose 103 70 - 110 mg/dL    BUN 8 8 - 23 mg/dL    Creatinine 0.6 0.5 - 1.4 mg/dL    Calcium 8.3 (L) 8.7 - 10.5 mg/dL    Total Protein 6.1 6.0 - 8.4 g/dL    Albumin 1.9 (L) 3.5 - 5.2 g/dL    Total Bilirubin 27.3 (H) 0.1 - 1.0 mg/dL    Alkaline Phosphatase 1,231 (H) 55 - 135 U/L     (H) 10 - 40 U/L     (H) 10 - 44 U/L    Anion Gap 15 8 - 16 mmol/L    eGFR >60.0 >60 mL/min/1.73 m^2   Magnesium    Collection Time: 02/12/23  5:37 AM   Result Value Ref Range    Magnesium 1.2 (L) 1.6 - 2.6 mg/dL   Phosphorus    Collection Time: 02/12/23  5:37 AM   Result Value Ref Range    Phosphorus 2.3 (L) 2.7 - 4.5 mg/dL   POCT glucose    Collection Time: 02/12/23 11:21 AM   Result Value Ref Range    POCT Glucose 121 (H) 70 - 110 mg/dL   POCT glucose    Collection Time: 02/12/23  5:02 PM   Result Value Ref Range    POCT Glucose  131 (H) 70 - 110 mg/dL   POCT glucose    Collection Time: 02/12/23  8:36 PM   Result Value Ref Range    POCT Glucose 129 (H) 70 - 110 mg/dL   Comprehensive Metabolic Panel (CMP)    Collection Time: 02/13/23  5:06 AM   Result Value Ref Range    Sodium 132 (L) 136 - 145 mmol/L    Potassium 3.1 (L) 3.5 - 5.1 mmol/L    Chloride 91 (L) 95 - 110 mmol/L    CO2 26 23 - 29 mmol/L    Glucose 121 (H) 70 - 110 mg/dL    BUN 5 (L) 8 - 23 mg/dL    Creatinine 0.5 0.5 - 1.4 mg/dL    Calcium 8.2 (L) 8.7 - 10.5 mg/dL    Total Protein 5.3 (L) 6.0 - 8.4 g/dL    Albumin 1.8 (L) 3.5 - 5.2 g/dL    Total Bilirubin 27.4 (H) 0.1 - 1.0 mg/dL    Alkaline Phosphatase 1,197 (H) 55 - 135 U/L     (H) 10 - 40 U/L     (H) 10 - 44 U/L    Anion Gap 15 8 - 16 mmol/L    eGFR >60.0 >60 mL/min/1.73 m^2   Magnesium    Collection Time: 02/13/23  5:06 AM   Result Value Ref Range    Magnesium 1.6 1.6 - 2.6 mg/dL   Phosphorus    Collection Time: 02/13/23  5:06 AM   Result Value Ref Range    Phosphorus 2.7 2.7 - 4.5 mg/dL   CBC with Automated Differential    Collection Time: 02/13/23  5:06 AM   Result Value Ref Range    WBC 19.89 (H) 3.90 - 12.70 K/uL    RBC 4.42 (L) 4.60 - 6.20 M/uL    Hemoglobin 9.6 (L) 14.0 - 18.0 g/dL    Hematocrit 29.8 (L) 40.0 - 54.0 %    MCV 67 (L) 82 - 98 fL    MCH 21.7 (L) 27.0 - 31.0 pg    MCHC 32.2 32.0 - 36.0 g/dL    RDW 20.9 (H) 11.5 - 14.5 %    Platelets 378 150 - 450 K/uL    MPV 11.1 9.2 - 12.9 fL    Immature Granulocytes 0.8 (H) 0.0 - 0.5 %    Gran # (ANC) 16.3 (H) 1.8 - 7.7 K/uL    Immature Grans (Abs) 0.15 (H) 0.00 - 0.04 K/uL    Lymph # 1.6 1.0 - 4.8 K/uL    Mono # 1.6 (H) 0.3 - 1.0 K/uL    Eos # 0.0 0.0 - 0.5 K/uL    Baso # 0.08 0.00 - 0.20 K/uL    nRBC 0 0 /100 WBC    Gran % 82.2 (H) 38.0 - 73.0 %    Lymph % 8.2 (L) 18.0 - 48.0 %    Mono % 8.2 4.0 - 15.0 %    Eosinophil % 0.2 0.0 - 8.0 %    Basophil % 0.4 0.0 - 1.9 %    Differential Method Automated    VANCOMYCIN, TROUGH    Collection Time: 02/13/23  5:06 AM    Result Value Ref Range    Vancomycin-Trough 24.5 (H) 10.0 - 22.0 ug/mL   Type & Screen    Collection Time: 02/13/23  5:06 AM   Result Value Ref Range    Group & Rh A POS     Indirect Michelle NEG        EKG:   Results for orders placed or performed during the hospital encounter of 02/11/23   EKG 12-lead    Collection Time: 02/11/23  2:56 PM    Narrative    Test Reason : R06.02,    Vent. Rate : 106 BPM     Atrial Rate : 106 BPM     P-R Int : 138 ms          QRS Dur : 140 ms      QT Int : 392 ms       P-R-T Axes : 080 -73 060 degrees     QTc Int : 520 ms    Sinus tachycardia  Possible Left atrial enlargement  Right bundle branch block  Left anterior fascicular block   Bifascicular block   Abnormal ECG  When compared with ECG of 12-MAY-2022 19:11,  No significant change was found  Confirmed by Sandeep Siddiqui MD ([id number] on 2/12/2023 8:49:27 AM    Referred By: [email or web address]   SELF           Confirmed By:Sandeep Siddiqui MD       TTE:  Results for orders placed or performed during the hospital encounter of 05/12/22   Echo   Result Value Ref Range    Ascending aorta 2.94 cm    STJ 3.02 cm    AV mean gradient 2 mmHg    Ao peak jean-claude 0.94 m/s    Ao VTI 19.19 cm    IVS 1.01 0.6 - 1.1 cm    LA size 3.04 cm    Left Atrium Major Axis 4.73 cm    Left Atrium Minor Axis 4.13 cm    LVIDd 3.07 (A) 3.5 - 6.0 cm    LVIDs 2.31 2.1 - 4.0 cm    LVOT diameter 2.02 cm    LVOT peak VTI 15.70 cm    Posterior Wall 0.95 0.6 - 1.1 cm    MV Peak A Jean-Claude 0.40 m/s    E wave deceleration time 165.64 msec    MV Peak E Jean-Claude 0.54 m/s    RA Major Axis 4.54 cm    RA Width 2.95 cm    RVDD 2.97 cm    Sinus 3.08 cm    TAPSE 1.92 cm    TDI LATERAL 0.09 m/s    TDI SEPTAL 0.06 m/s    LA WIDTH 2.96 cm    MV stenosis pressure 1/2 time 48.04 ms    LV Diastolic Volume 37.16 mL    LV Systolic Volume 18.34 mL    LVOT peak jean-claude 0.82 m/s    LV LATERAL E/E' RATIO 6.00 m/s    LV SEPTAL E/E' RATIO 9.00 m/s    FS 25 %    LA volume 33.73 cm3    LV mass 82.41 g    Left Ventricle  Relative Wall Thickness 0.62 cm    AV valve area 2.62 cm2    AV Velocity Ratio 0.87     AV index (prosthetic) 0.82     MV valve area p 1/2 method 4.58 cm2    E/A ratio 1.35     Mean e' 0.08 m/s    LVOT area 3.2 cm2    LVOT stroke volume 50.29 cm3    AV peak gradient 4 mmHg    E/E' ratio 7.20 m/s    BSA 2.18 m2    LV Systolic Volume Index 8.5 mL/m2    LV Diastolic Volume Index 17.12 mL/m2    LA Volume Index 15.5 mL/m2    LV Mass Index 38 g/m2    Right Atrial Pressure (from IVC) 3 mmHg    EF 55 %    Narrative    · The left ventricle is normal in size with concentric remodeling and   normal systolic function. The estimated ejection fraction is 55%.  · Normal right ventricular size with normal right ventricular systolic   function.  · Normal left ventricular diastolic function.  · Normal central venous pressure (3 mmHg).        EF   Date Value Ref Range Status   05/13/2022 55 % Final      No results found for this or any previous visit.  HAILE:  No results found for this or any previous visit.  Stress Test:  No results found for this or any previous visit.     LHC:  No results found for this or any previous visit.     PFT:  No results found for: FEV1, FVC, DVE2WNU, TLC, DLCO   ASSESSMENT/PLAN:         Pre-op Assessment    I have reviewed the Patient Summary Reports.     I have reviewed the Nursing Notes.    I have reviewed the Medications.     Review of Systems  Anesthesia Hx:  No problems with previous Anesthesia    Hematology/Oncology:  Hematology Normal   Oncology Normal     EENT/Dental:EENT/Dental Normal   Cardiovascular:   Exercise tolerance: good Hypertension    Pulmonary:   Pneumonia COPD    Renal/:  Renal/ Normal     Hepatic/GI:   GERD    Musculoskeletal:  Musculoskeletal Normal    Neurological:   CVA Headaches    Endocrine:  Endocrine Normal    Dermatological:  Skin Normal    Psych:   Psychiatric History          Physical Exam  General: Well nourished    Airway:  Mallampati: III / II  Mouth Opening:  Normal  TM Distance: Normal  Tongue: Normal  Neck ROM: Normal ROM    Dental:  Intact        Anesthesia Plan  Type of Anesthesia, risks & benefits discussed:    Anesthesia Type: Gen ETT, Gen Natural Airway  Intra-op Monitoring Plan: Standard ASA Monitors  Post Op Pain Control Plan: multimodal analgesia and IV/PO Opioids PRN  Induction:  IV  Airway Plan: Direct and Video, Post-Induction  Informed Consent: Informed consent signed with the Patient and all parties understand the risks and agree with anesthesia plan.  All questions answered.   ASA Score: 4  Day of Surgery Review of History & Physical: H&P completed by Anesthesiologist.  Anesthesia Plan Notes: Chart reviewed, patient interviewed and examined.  The anesthetic plan was explained.  Risks, benefits, and alternatives were discussed. Questions were answered and the consent was signed.        JULIO CESAR De Leon M.D.         Ready For Surgery From Anesthesia Perspective.     .

## 2023-02-13 NOTE — HPI
Pt is a 62 y.o. male with a PMHx of HTN, COPD, and CVA (residual RUE contracture and RLE weakness) who presented from a nursing home after the staff noticed he was jaundiced a day ago. Nursing staff also reportsed confusion and fatigue. HPI limited as patient is confused but he reports abdominal pain for the last 5 days and dysuria for the last 4 days. Denies any fevers, chills, chest pain, SOB, cough, nausea, diarrhea, or vomiting. He is prescribed an albuterol inhaler for COPD, denies sleeping with a bipap at night.      Per chart review:   In the ED patient was afebrile, tachycardic to 110, BP 90/64, and was on RA. Labs significant for a WBC 16.1, Na 124, K 2.8, Alk phos 1600, Tbili 29, , , Ammonia 67. BNP, trop, and lacate x2 were normal. Initial ABG with a pH of 7.15 pCO2 of 76, repeat with a pH 7.54 and pCO2 of 33 without the use of bipap. Patient was given a dose of vanz/zosyn and was given 2.4L NS. CT head with no acute findings. CT abdomen with a large mass in the pancreatic head resulting in severe pancreatic ductal dilation and severe intra and extrahepatic biliary ductal dilation. Gen surg consulted and stated patient not a surgical candidate at this time. MICU consulted for sepsis and hyponatremia.    Plan is back to NH with possible hospice.

## 2023-02-13 NOTE — ASSESSMENT & PLAN NOTE
Impression: Pt is a 61 y/o male with  history of CVA and right hemparesis. Patient admitted from his nursing home with jaundice, AMS, and tachycardia. On arrival, Tbili 29.3 with associated transamintis. 2.3 cm mass noted in head of pancreas with associated biliary dilation and suspcious intrabdominal LAD. Pt to have ERCP today. Pt is alert, oriented to person, place, and aware he has a pancreatic mass. Pt is a DNR.    Reason for consult: ACP/hospice. Discussed case with Dr. Hunter.    Goals of care/ACP  No family at bedside. Per pt he lives at a NH and mostly in bed dute to effects of past stroke. Pt is aware of pancreatic mass. Per pt, he has a daughter and a sister who lives in [place]. CC team has spoken to pt's sister in [place] for ACP planning.     Called and spoke to pt's sister in [place]. She will be coming to visit  Tomorrow around 900. Per pt's sister, pt's daughter is also aware of medical situation and will be visiting.   Pt's sister reports she is MPOA. No paperwork in chart. Pt's sister to bring. Pt's NOK is his adult kid(s) if no MPOA paperwork completed.     Code status: DNR    Called and updated Dr. Hunter on above info.     Symptom management:    Debility:  Pt bed-bound.   Pt has  Right sided weakness.    Repositioning per bedside nurse    Nausea:  Pt reports intermittent nausea    Meds:    Would have zofran 4 mg q 8 hrs prn.     EUS and palliate jaundice  Treatment per primary team.   treat possible cholangitis with biliary decompression via ERCP, if successful.  Procedure today.     Plan:  Pt's sister and daughter to visit tomorrow.   Possible hospice at discharge.

## 2023-02-13 NOTE — H&P
Short Stay Endoscopy History and Physical    PCP - Bean Pearce MD  Referring Physician - Aaareferral Self  No address on file    Procedure - EUS and ERCP  ASA - per anesthesia  Mallampati - per anesthesia  History of Anesthesia problems - per anesthesia  Family history Anesthesia problems -  per anesthesia   Plan of anesthesia - per anesthesia    HPI:  This is a 62 y.o. male here for evaluation of: EUS and ERCP for obstructive jaundice from pancreas head mass/lesion with concern for metastatic process; hyperbilirubinemia/elevated LFTs, recent AMS with symptomatic hyponatremia, sepsis, suspected cholangitis requiring ICU level of care.    Reflux - no  Dysphagia - no  Abdominal pain - yes  Diarrhea - no    ROS:  Constitutional: No fevers, chills, jaundice  CV: No chest pain  Pulm: No cough, No shortness of breath  Ophtho: No vision changes  GI: see HPI  Derm: No rash    Medical History:  has a past medical history of Alcohol abuse, Aphasia, COPD (chronic obstructive pulmonary disease), GERD (gastroesophageal reflux disease), Hypertension, Hypokalemia, Opioid abuse, Stroke, and Unspecified glaucoma.    Surgical History:  has no past surgical history on file.    Family History: family history is not on file..    Social History:  reports that he has never smoked. He has never used smokeless tobacco. He reports that he does not currently use drugs.    Review of patient's allergies indicates:  No Known Allergies    Medications:   Medications Prior to Admission   Medication Sig Dispense Refill Last Dose    atorvastatin (LIPITOR) 20 MG tablet Take 20 mg by mouth every evening.       brimonidine 0.2% (ALPHAGAN) 0.2 % Drop Place 1 drop into both eyes 2 (two) times a day.       bumetanide (BUMEX) 2 MG tablet Take 2 mg by mouth once daily.       famotidine (PEPCID) 20 MG tablet Take 20 mg by mouth once daily.       acetaminophen (TYLENOL) 325 MG tablet Take 325 mg by mouth every 4 (four) hours as needed (headache/  fever).       albuterol (PROAIR HFA) 90 mcg/actuation inhaler Inhale 2 puffs into the lungs every 4 (four) hours as needed for Wheezing. Rescue       aluminum-magnesium hydroxide-simethicone (MAALOX) 200-200-20 mg/5 mL Susp Take 30 mLs by mouth every 4 (four) hours as needed (dyspepsia).       ascorbic acid, vitamin C, (VITAMIN C) 500 MG tablet Take 500 mg by mouth once daily.       bisacodyL (DULCOLAX) 10 mg Supp Place 10 mg rectally daily as needed (constipation).       ergocalciferol (ERGOCALCIFEROL) 50,000 unit Cap Take 50,000 Units by mouth every Thursday.       guaiFENesin (MUCINEX) 600 mg 12 hr tablet Take 600 mg by mouth 2 (two) times daily.       levETIRAcetam (KEPPRA) 500 MG Tab Take 500 mg by mouth 2 (two) times daily.       metoclopramide HCl (REGLAN) 5 mg/5 mL Soln Take 5 mg by mouth every 8 (eight) hours as needed (ileus issues).       metoprolol tartrate (LOPRESSOR) 25 MG tablet Take 25 mg by mouth 2 (two) times daily.       multivitamin (ONE DAILY MULTIVITAMIN) per tablet Take 1 tablet by mouth once daily.       phenylephrine/diphenhydramine (DIPHENHYDRAMINE-PHENYLEPHRINE ORAL) Take 10 mLs by mouth 3 (three) times daily as needed (cough).       potassium chloride SA (K-DUR,KLOR-CON) 20 MEQ tablet Take 20 mEq by mouth 2 (two) times daily.       senna (SENOKOT) 8.6 mg tablet Take 2 tablets by mouth daily as needed for Constipation.       travoprost, benzalkonium, (TRAVATAN) 0.004 % ophthalmic solution Place 1 drop into both eyes every evening.          Physical Exam:    Vital Signs:   Vitals:    02/13/23 1300   BP: 103/61   Pulse: (!) 115   Resp: 14   Temp:        General Appearance: Ill appearing in no acute distress    Labs:  Lab Results   Component Value Date    WBC 19.89 (H) 02/13/2023    HGB 9.6 (L) 02/13/2023    HCT 29.8 (L) 02/13/2023     02/13/2023     (H) 02/13/2023     (H) 02/13/2023     (L) 02/13/2023    K 3.1 (L) 02/13/2023    CL 91 (L) 02/13/2023    CREATININE  0.5 02/13/2023    BUN 5 (L) 02/13/2023    CO2 26 02/13/2023    TSH 5.012 (H) 05/12/2022    INR 1.1 11/14/2014       We have explained the risks and benefits of this endoscopic procedure to the patient's durable power of  (sister Dalia) including but not limited to bleeding, inflammation, infection, perforation, pancreatitis, missing a lesion and death; and she has elected to proceed with offering these procedures for the patient.      Isadora Chang MD

## 2023-02-13 NOTE — ASSESSMENT & PLAN NOTE
Presented from nursing home due to jaundice and abdominal pain  On admission Tbili 29, Alk phos 1600, , Alt 393  CT abdomen/pelvis from 2/11 showing large mass in the pancreatic head, severe dilation of pancreatic duct and intra/extra hepatic biliary ducts as well as prominent lymph nodes concerning for metastatic disease  CA-A 19-9 ordered by ED and found to be elevated [id number]  Seen by gen surg who advised Pt is not a surgical candidate and is unlikely to tolerate a Whipple procedure    Plan:  - NPO   -ERCP today   - heme/onc consulted:    Picture is consistent with new diagnosis of pancreatic adenocarcinoma.    Regardless of metastatic state he is not currently fit for either palliative or neoadjuvant chemotherapy  - analgesia prn  - hold home atorvastatin 2/2 transaminitis  - continue to trend LFTs

## 2023-02-13 NOTE — CONSULTS
[facility] - Cardiac Medical ICU  Palliative Medicine  Consult Note    Patient Name: Anthony Borges  MRN: [id number]  Admission Date: 2/11/2023  Hospital Length of Stay: 2 days  Code Status: DNR   Attending Provider: Mando Elliott MD  Consulting Provider: GOYO Garner  Primary Care Physician: Bean Pearce MD  Principal Problem:Pancreatic mass    Patient information was obtained from patient, relative(s) and primary team.      Inpatient consult to Palliative Care  Consult performed by: GOYO Rios  Consult ordered by: Daksha Hunter MD        Assessment/Plan:     Palliative care encounter  Impression: Pt is a 61 y/o male with  history of CVA and right hemparesis. Patient admitted from his nursing home with jaundice, AMS, and tachycardia. On arrival, Tbili 29.3 with associated transamintis. 2.3 cm mass noted in head of pancreas with associated biliary dilation and suspcious intrabdominal LAD. Pt to have ERCP today. Pt is alert, oriented to person, place, and aware he has a pancreatic mass. Pt is a DNR.    Reason for consult: ACP/hospice. Discussed case with Dr. Hunter.    Goals of care/ACP  No family at bedside. Per pt he lives at a NH and mostly in bed dute to effects of past stroke. Pt is aware of pancreatic mass. Per pt, he has a daughter and a sister who lives in [place]. CC team has spoken to pt's sister in [place] for ACP planning.     Called and spoke to pt's sister in [place]. She will be coming to visit  Tomorrow around 900. Per pt's sister, pt's daughter is also aware of medical situation and will be visiting.   Pt's sister reports she is MPOA. No paperwork in chart. Pt's sister to bring. Pt's NOK is his adult kid(s) if no MPOA paperwork completed.     Code status: DNR    Called and updated Dr. Hunter on above info.     Symptom management:    Debility:  Pt bed-bound.   Pt has  Right sided weakness.    Repositioning per bedside nurse    Nausea:  Pt reports intermittent  nausea    Meds:    Would have zofran 4 mg q 8 hrs prn.     EUS and palliate jaundice  Treatment per primary team.   treat possible cholangitis with biliary decompression via ERCP, if successful.  Procedure today.     Plan:  Pt's sister and daughter to visit tomorrow.   Possible hospice at discharge.                    Thank you for your consult. I will follow-up with patient. Please contact us if you have any additional questions.    Subjective:     HPI:   Pt is a 62 y.o. male with a PMHx of HTN, COPD, and CVA (residual RUE contracture and RLE weakness) who presented from a nursing home after the staff noticed he was jaundiced a day ago. Nursing staff also reportsed confusion and fatigue. HPI limited as patient is confused but he reports abdominal pain for the last 5 days and dysuria for the last 4 days. Denies any fevers, chills, chest pain, SOB, cough, nausea, diarrhea, or vomiting. He is prescribed an albuterol inhaler for COPD, denies sleeping with a bipap at night.      Per chart review:   In the ED patient was afebrile, tachycardic to 110, BP 90/64, and was on RA. Labs significant for a WBC 16.1, Na 124, K 2.8, Alk phos 1600, Tbili 29, , , Ammonia 67. BNP, trop, and lacate x2 were normal. Initial ABG with a pH of 7.15 pCO2 of 76, repeat with a pH 7.54 and pCO2 of 33 without the use of bipap. Patient was given a dose of vanz/zosyn and was given 2.4L NS. CT head with no acute findings. CT abdomen with a large mass in the pancreatic head resulting in severe pancreatic ductal dilation and severe intra and extrahepatic biliary ductal dilation. Gen surg consulted and stated patient not a surgical candidate at this time. MICU consulted for sepsis and hyponatremia.    Plan is back to NH with possible hospice.         Hospital Course:  No notes on file    Interval History: Pt has pancreatic mass.     Past Medical History:   Diagnosis Date    Alcohol abuse     Aphasia     COPD (chronic obstructive  pulmonary disease)     GERD (gastroesophageal reflux disease)     Hypertension     Hypokalemia     Opioid abuse     Stroke     Unspecified glaucoma        History reviewed. No pertinent surgical history.    Review of patient's allergies indicates:  No Known Allergies    Medications:  Continuous Infusions:  Scheduled Meds:   ascorbic acid (vitamin C)  500 mg Oral Daily    brimonidine 0.2%  1 drop Both Eyes BID    [START ON 2/16/2023] ergocalciferol  50,000 Units Oral Every Thurs    famotidine  20 mg Oral Daily    heparin (porcine)  5,000 Units Subcutaneous Q8H    levETIRAcetam  500 mg Oral BID    piperacillin-tazobactam (ZOSYN) IVPB  4.5 g Intravenous Q8H     PRN Meds:albuterol, aluminum-magnesium hydroxide-simethicone, dextrose 10%, dextrose 10%, glucagon (human recombinant), glucose, glucose, insulin aspart U-100, naloxone, sodium chloride 0.9%, Pharmacy to dose Vancomycin consult **AND** vancomycin - pharmacy to dose    Family History    None       Tobacco Use    Smoking status: Never    Smokeless tobacco: Never   Substance and Sexual Activity    Alcohol use: Not on file    Drug use: Not Currently    Sexual activity: Not on file       Review of Systems   Constitutional:  Positive for activity change and fatigue.   Gastrointestinal:  Positive for abdominal pain. Negative for nausea and vomiting.   Musculoskeletal:  Positive for gait problem.   Skin:  Positive for wound.   Neurological:  Positive for weakness.   Objective:     Vital Signs (Most Recent):  Temp: 98 °F (36.7 °C) (02/13/23 0705)  Pulse: 110 (02/13/23 0900)  Resp: 15 (02/13/23 0900)  BP: (!) 93/54 (02/13/23 0900)  SpO2: 96 % (02/13/23 0900)   Vital Signs (24h Range):  Temp:  [98 °F (36.7 °C)-98.3 °F (36.8 °C)] 98 °F (36.7 °C)  Pulse:  [] 110  Resp:  [13-22] 15  SpO2:  [91 %-99 %] 96 %  BP: ()/(50-68) 93/54     Weight: 92.1 kg (203 lb)  Body mass index is 26.78 kg/m².    Physical Exam  Constitutional:       General: He is not  in acute distress.  HENT:      Head: Normocephalic and atraumatic.   Eyes:      General: Lids are normal.      Conjunctiva/sclera: Conjunctivae normal.   Pulmonary:      Effort: Pulmonary effort is normal. No respiratory distress.   Abdominal:      Tenderness: There is abdominal tenderness.   Musculoskeletal:      Comments: Bed-bound   Skin:     General: Skin is warm and dry.   Neurological:      Mental Status: He is alert.      Comments: Oriented to person, place, situation somewhat       Review of Symptoms      Symptom Assessment (ESAS 0-10 Scale)  Pain:  0  Dyspnea:  0  Anxiety:  0  Nausea:  0  Depression:  0  Anorexia:  0  Fatigue:  0  Insomnia:  0  Restlessness:  0  Agitation:  0         Living Arrangements:  Lives in nursing home    Psychosocial/Cultural:   See Palliative Psychosocial Note: No  No MPOA paperwork in chart. Pt lives at a NH. Pt as sister and daughter listed as contacts.   **Primary  to Follow**  Palliative Care  Consult: No    Spiritual:  F - Lynn and Belief:  Caodaism  A - Address in Care:  Pt wants  to visit.       Advance Care Planning        Significant Labs: All pertinent labs within the past 24 hours have been reviewed.  CBC:   Recent Labs   Lab 02/13/23  0506   WBC 19.89*   HGB 9.6*   HCT 29.8*   MCV 67*        BMP:  Recent Labs   Lab 02/13/23  0506   *   *   K 3.1*   CL 91*   CO2 26   BUN 5*   CREATININE 0.5   CALCIUM 8.2*   MG 1.6     LFT:  Lab Results   Component Value Date     (H) 02/13/2023    ALKPHOS 1,197 (H) 02/13/2023    BILITOT 27.4 (H) 02/13/2023     Albumin:   Albumin   Date Value Ref Range Status   02/13/2023 1.8 (L) 3.5 - 5.2 g/dL Final     Protein:   Total Protein   Date Value Ref Range Status   02/13/2023 5.3 (L) 6.0 - 8.4 g/dL Final     Lactic acid:   Lab Results   Component Value Date    LACTATE 1.1 02/11/2023    LACTATE 1.1 02/11/2023       Significant Imaging: I have reviewed all pertinent imaging  results/findings within the past 24 hours.        20 minutes of ACP completed.       Molly Zapata, CNS  Palliative Medicine  [facility] - Cardiac Medical ICU

## 2023-02-13 NOTE — PT/OT/SLP PROGRESS
Speech Language Pathology  Patient Not Seen      Anthony Borges  MRN: [id number]    Patient not seen today secondary to MD hold, NPO for procedure. Will follow-up next scheduled treatment date.

## 2023-02-13 NOTE — ANESTHESIA PROCEDURE NOTES
Intubation    Date/Time: 2/13/2023 3:29 PM  Performed by: Marlene Perry CRNA  Authorized by: Eddie Cantu MD     Intubation:     Induction:  Intravenous    Intubated:  Postinduction    Mask Ventilation:  Easy with oral airway    Attempts:  1    Attempted By:  CRNA    Method of Intubation:  Direct    Blade:  Liu 2    Laryngeal View Grade: Grade I - full view of cords      Difficult Airway Encountered?: No      Complications:  None    Airway Device:  Oral endotracheal tube    Airway Device Size:  7.5    Style/Cuff Inflation:  Cuffed    Inflation Amount (mL):  6    Tube secured:  21    Secured at:  The lips    Placement Verified By:  Capnometry    Complicating Factors:  None    Findings Post-Intubation:  BS equal bilateral and atraumatic/condition of teeth unchanged

## 2023-02-13 NOTE — SUBJECTIVE & OBJECTIVE
Interval History: Pt has pancreatic mass.     Past Medical History:   Diagnosis Date    Alcohol abuse     Aphasia     COPD (chronic obstructive pulmonary disease)     GERD (gastroesophageal reflux disease)     Hypertension     Hypokalemia     Opioid abuse     Stroke     Unspecified glaucoma        History reviewed. No pertinent surgical history.    Review of patient's allergies indicates:  No Known Allergies    Medications:  Continuous Infusions:  Scheduled Meds:   ascorbic acid (vitamin C)  500 mg Oral Daily    brimonidine 0.2%  1 drop Both Eyes BID    [START ON 2/16/2023] ergocalciferol  50,000 Units Oral Every Thurs    famotidine  20 mg Oral Daily    heparin (porcine)  5,000 Units Subcutaneous Q8H    levETIRAcetam  500 mg Oral BID    piperacillin-tazobactam (ZOSYN) IVPB  4.5 g Intravenous Q8H     PRN Meds:albuterol, aluminum-magnesium hydroxide-simethicone, dextrose 10%, dextrose 10%, glucagon (human recombinant), glucose, glucose, insulin aspart U-100, naloxone, sodium chloride 0.9%, Pharmacy to dose Vancomycin consult **AND** vancomycin - pharmacy to dose    Family History    None       Tobacco Use    Smoking status: Never    Smokeless tobacco: Never   Substance and Sexual Activity    Alcohol use: Not on file    Drug use: Not Currently    Sexual activity: Not on file       Review of Systems   Constitutional:  Positive for activity change and fatigue.   Gastrointestinal:  Positive for abdominal pain. Negative for nausea and vomiting.   Musculoskeletal:  Positive for gait problem.   Skin:  Positive for wound.   Neurological:  Positive for weakness.   Objective:     Vital Signs (Most Recent):  Temp: 98 °F (36.7 °C) (02/13/23 0705)  Pulse: 110 (02/13/23 0900)  Resp: 15 (02/13/23 0900)  BP: (!) 93/54 (02/13/23 0900)  SpO2: 96 % (02/13/23 0900)   Vital Signs (24h Range):  Temp:  [98 °F (36.7 °C)-98.3 °F (36.8 °C)] 98 °F (36.7 °C)  Pulse:  [] 110  Resp:  [13-22] 15  SpO2:  [91 %-99 %] 96  %  BP: ()/(50-68) 93/54     Weight: 92.1 kg (203 lb)  Body mass index is 26.78 kg/m².    Physical Exam  Constitutional:       General: He is not in acute distress.  HENT:      Head: Normocephalic and atraumatic.   Eyes:      General: Lids are normal.      Conjunctiva/sclera: Conjunctivae normal.   Pulmonary:      Effort: Pulmonary effort is normal. No respiratory distress.   Abdominal:      Tenderness: There is abdominal tenderness.   Musculoskeletal:      Comments: Bed-bound   Skin:     General: Skin is warm and dry.   Neurological:      Mental Status: He is alert.      Comments: Oriented to person, place, situation somewhat       Review of Symptoms      Symptom Assessment (ESAS 0-10 Scale)  Pain:  0  Dyspnea:  0  Anxiety:  0  Nausea:  0  Depression:  0  Anorexia:  0  Fatigue:  0  Insomnia:  0  Restlessness:  0  Agitation:  0         Living Arrangements:  Lives in nursing home    Psychosocial/Cultural:   See Palliative Psychosocial Note: No  No MPOA paperwork in chart. Pt lives at a NH. Pt as sister and daughter listed as contacts.   **Primary  to Follow**  Palliative Care  Consult: No    Spiritual:  F - Lynn and Belief:  Bahai  A - Address in Care:  Pt wants  to visit.       Advance Care Planning        Significant Labs: All pertinent labs within the past 24 hours have been reviewed.  CBC:   Recent Labs   Lab 02/13/23  0506   WBC 19.89*   HGB 9.6*   HCT 29.8*   MCV 67*        BMP:  Recent Labs   Lab 02/13/23  0506   *   *   K 3.1*   CL 91*   CO2 26   BUN 5*   CREATININE 0.5   CALCIUM 8.2*   MG 1.6     LFT:  Lab Results   Component Value Date     (H) 02/13/2023    ALKPHOS 1,197 (H) 02/13/2023    BILITOT 27.4 (H) 02/13/2023     Albumin:   Albumin   Date Value Ref Range Status   02/13/2023 1.8 (L) 3.5 - 5.2 g/dL Final     Protein:   Total Protein   Date Value Ref Range Status   02/13/2023 5.3 (L) 6.0 - 8.4 g/dL Final     Lactic acid:   Lab Results    Component Value Date    LACTATE 1.1 02/11/2023    LACTATE 1.1 02/11/2023       Significant Imaging: I have reviewed all pertinent imaging results/findings within the past 24 hours.

## 2023-02-13 NOTE — PROGRESS NOTES
[facility] - Cardiac Medical ICU  Critical Care Medicine  Progress Note    Patient Name: Anthony Borges  MRN: [id number]  Admission Date: 2/11/2023  Hospital Length of Stay: 2 days  Code Status: DNR  Attending Provider: Mando Elliott MD  Primary Care Provider: Bean Pearce MD   Principal Problem: Pancreatic mass    Subjective:     HPI:  Mr. Anthony Borges is a 62 y.o. male with a PMHx of HTN, COPD, and CVA (residual RUE contracture and RLE weakness) who presented from a nursing home after the staff noticed he was jaundiced a day ago. Nursing staff also reports confusion and fatigue. HPI limited as patient is confused but he reports abdominal pain for the last 5 days and dysuria for the last 4 days. Denies any fevers, chills, chest pain, SOB, cough, nausea, diarrhea, or vomiting. He is prescribed an albuterol inhaler for COPD, denies sleeping with a bipap at night.     In the ED patient was afebrile, tachycardic to 110, BP 90/64, and was on RA. Labs significant for a WBC 16.1, Na 124, K 2.8, Alk phos 1600, Tbili 29, , , Ammonia 67. BNP, trop, and lacate x2 were normal. Initial ABG with a pH of 7.15 pCO2 of 76, repeat with a pH 7.54 and pCO2 of 33 without the use of bipap. Patient was given a dose of vanz/zosyn and was given 2.4L NS. CT head with no acute findings. CT abdomen with a large mass in the pancreatic head resulting in severe pancreatic ductal dilation and severe intra and extrahepatic biliary ductal dilation. Gen surg consulted and stated patient not a surgical candidate at this time. MICU consulted for sepsis and hyponatremia.      Hospital/ICU Course:  No notes on file    Interval History/Significant Events: No acute events overnight    Review of Systems   Unable to perform ROS: Mental status change   Objective:     Vital Signs (Most Recent):  Temp: 98 °F (36.7 °C) (02/13/23 0705)  Pulse: (!) 112 (02/13/23 0800)  Resp: 13 (02/13/23 0800)  BP: (!) 95/57 (02/13/23 0800)  SpO2: (!) 94 %  (02/13/23 0800)   Vital Signs (24h Range):  Temp:  [98 °F (36.7 °C)-98.3 °F (36.8 °C)] 98 °F (36.7 °C)  Pulse:  [] 112  Resp:  [13-22] 13  SpO2:  [91 %-99 %] 94 %  BP: ()/(50-68) 95/57   Weight: 92.1 kg (203 lb)  Body mass index is 26.78 kg/m².      Intake/Output Summary (Last 24 hours) at 2/13/2023 0836  Last data filed at 2/13/2023 0800  Gross per 24 hour   Intake 1532.85 ml   Output 2610 ml   Net -1077.15 ml       Physical Exam  Vitals and nursing note reviewed.   Constitutional:       Appearance: He is cachectic. He is ill-appearing.   HENT:      Mouth/Throat:      Mouth: Mucous membranes are moist.   Eyes:      Extraocular Movements: Extraocular movements intact.      Pupils: Pupils are equal, round, and reactive to light.   Cardiovascular:      Pulses: Normal pulses.   Pulmonary:      Effort: Pulmonary effort is normal. No respiratory distress.   Abdominal:      Palpations: Abdomen is soft.      Tenderness: There is no abdominal tenderness. There is no guarding or rebound.   Musculoskeletal:      Right lower leg: Edema present.      Left lower leg: Edema present.      Comments: Chronic R sided contractures    Skin:     General: Skin is dry.      Coloration: Skin is jaundiced.   Neurological:      Mental Status: He is alert and oriented to person, place, and time. He is confused.      GCS: GCS eye subscore is 4. GCS verbal subscore is 4. GCS motor subscore is 6.      Cranial Nerves: No facial asymmetry.      Comments: Asks questions repeatedly. Has some understanding of his care but occasionally will have muffled speech    Psychiatric:         Mood and Affect: Mood normal.       Vents:     Lines/Drains/Airways       Drain  Duration                  Urethral Catheter 02/11/23 1624 Straight-tip 1 day              Peripheral Intravenous Line  Duration                  Peripheral IV - Single Lumen 02/12/23 1200 22 G Left;Medial Other <1 day         Peripheral IV - Single Lumen 02/12/23 1255 20 G  Distal;Left;Posterior Forearm <1 day                  Significant Labs:    CBC/Anemia Profile:  Recent Labs   Lab 02/11/23  1510 02/11/23  1531 02/11/23  2147 02/12/23  0359 02/13/23  0506   WBC 18.61*  --   --  30.27* 19.89*   HGB 11.6*  --   --  10.4* 9.6*   HCT 37.4*   < > 37 32.2* 29.8*     --   --  352 378   MCV 69*  --   --  69* 67*   RDW 22.3*  --   --  22.1* 20.9*    < > = values in this interval not displayed.        Chemistries:  Recent Labs   Lab 02/11/23  1510 02/12/23  0537 02/13/23  0506   * 127* 132*   K 2.8* 4.3 3.1*   CL 84* 90* 91*   CO2 24 22* 26   BUN 10 8 5*   CREATININE 0.6 0.6 0.5   CALCIUM 9.3 8.3* 8.2*   ALBUMIN 2.2* 1.9* 1.8*   PROT 7.2 6.1 5.3*   BILITOT 29.3* 27.3* 27.4*   ALKPHOS 1,621* 1,231* 1,197*   * 325* 297*   * 357* 306*   MG  --  1.2* 1.6   PHOS  --  2.3* 2.7       All pertinent labs within the past 24 hours have been reviewed.    Significant Imaging:  I have reviewed all pertinent imaging results/findings within the past 24 hours.    ABG  Recent Labs   Lab 02/11/23 2147   PH 7.544*   PO2 63*   PCO2 33.1*   HCO3 28.5*   BE 6     Assessment/Plan:     Neuro  Acute encephalopathy  Upon initial assessment, Pt was oriented to person, place, and month (did not know day or week or year)  On second assessment, remains alert but not oriented to place  CT head unremarkable for acute process  Altered mentation could be secondary to infection vs hyponatremia vs hyperammonemia     Plan:   - NPO for now   - follow-up SLP consult, unable to assess due to NPO status will retry assessment after procedure   - delirium precautions    History of CVA (cerebrovascular accident)  Previous 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  Bedbound at baseline and lives in NH     Plan:  - continue home keppra  - seizure precautions   - turn Pt every 2 hours  - pressure ulcer precautions per nursing    Pulmonary  Chronic obstructive lung disease  On albuterol  PRN outpatient   No signs of wheezing or COPD exacerbation   Initial ABG with a pH of 7.15 and CO2 76, repeat pH 7.54 and pCO2 33 without bipap  Saturating > 92% on RA    Plan:  - PRN albuterol     Cardiac/Vascular  Hyperlipidemia  Hold home statin in setting of transaminitis     Renal/  Hyponatremia  Hyponatremic on presentation with Na 124  Baseline ~ 140  Also has low chloride  Pt oriented to person, place, and month but does appear somewhat confused; unclear if baseline given h/o CVA or 2/2 likely locally metastatic disease, infection, and elevated ammonia, etc vs hyponatremia   Hyponatremia possibly 2/2 malnutrition, poor PO intake in setting of abdominal pain  Could also be pseudohyponatremia in setting of jaundice   Na improved to 127 on blood gas after administration of IV fluids    Plan:  - follow-up urine Na, serum osm, urine osm  - monitor Na Q6h  - hold off on further methods to correct as Pt has already increased Na by 3 points in 3 hours; goal to increase sodium by 6-8 mEq/L/day in 24-hour period     ID  Sepsis  Meets sepsis criteria on presentation with leukocytosis and tachycardia  Pt reports abdominal pain and dysuria for the past 4-5 days  Some concern for possible cholecystitis on CT abdo  Source could be intra-abdominal vs urinary although UA not concerning for infection  CXR showing left sided atelectasis and pleural effusion which could be obscuring underlying infection    Plan:  - continue broad spectrum abx including atypical coverage for lung source   - blood culture : NGTD   - urine culture: NGTD  - given 2.5L IV fluids and initially responded, however subsequently became hypotensive; peripheral vasopressors with MAP goal > 65, wean as tolerated  - US abdomen to assess for cholecystitis  - seen by gen surg who recommended no surgical intervention in setting of likely metastatic pancreatic cancer and poor surgical candidacy; felt the inflammation of his gallbladder was likely from biliary  obstruction from the pancreatic mass    Oncology  Elevated CA 19-9 level  See pancreatic mass    Endocrine  Hyperammonemia  Ammonia elevated 67 on admission  Could have element of liver disease and hepatic encephalopathy contributing to altered mentation     Plan:  - lactulose 20g TID  - adjust dose to achieve 2 to 3 soft stools/day       GI  * Pancreatic mass  Presented from nursing home due to jaundice and abdominal pain  On admission Tbili 29, Alk phos 1600, , Alt 393  CT abdomen/pelvis from 2/11 showing large mass in the pancreatic head, severe dilation of pancreatic duct and intra/extra hepatic biliary ducts as well as prominent lymph nodes concerning for metastatic disease  CA-A 19-9 ordered by ED and found to be elevated 11341  Seen by gen surg who advised Pt is not a surgical candidate and is unlikely to tolerate a Whipple procedure    Plan:  - NPO   -ERCP today   - heme/onc consulted:    Picture is consistent with new diagnosis of pancreatic adenocarcinoma.    Regardless of metastatic state he is not currently fit for either palliative or neoadjuvant chemotherapy  - analgesia prn  - hold home atorvastatin 2/2 transaminitis  - continue to trend LFTs      Transaminitis  See pancreatitic mass    Gastroesophageal reflux disease  Continue home famotidine        Critical Care Daily Checklist:    A: Awake: RASS Goal/Actual Goal:    Actual: Morris Agitation Sedation Scale (RASS): (P) Alert and calm   B: Spontaneous Breathing Trial Performed?  NA   C: SAT & SBT Coordinated?  NA                      D: Delirium: CAM-ICU Overall CAM-ICU: (P) Negative   E: Early Mobility Performed? Yes   F: Feeding Goal:    Status:     Current Diet Order   Procedures    Diet NPO Except for: Medication, Sips with Medication     Order Specific Question:   Except for     Answer:   Medication     Order Specific Question:   Except for     Answer:   Sips with Medication      AS: Analgesia/Sedation none   T: Thromboembolic  Prophylaxis heparin   H: HOB > 300 Yes   U: Stress Ulcer Prophylaxis (if needed) famotidine   G: Glucose Control none   B: Bowel Function Stool Occurrence: 1   I: Indwelling Catheter (Lines & Burns) Necessity 2 PIV, burns    D: De-escalation of Antimicrobials/Pharmacotherapies D/c vanc, continue zosyn    Plan for the day/ETD ERCP    Code Status:  Family/Goals of Care: DNR  See plan of care for details        Critical secondary to Patient has a condition that poses threat to life and bodily function: sepsis requiring vasopressors       Critical care was time spent personally by me on the following activities: development of treatment plan with patient or surrogate and bedside caregivers, discussions with consultants, evaluation of patient's response to treatment, examination of patient, ordering and performing treatments and interventions, ordering and review of laboratory studies, ordering and review of radiographic studies, pulse oximetry, re-evaluation of patient's condition. This critical care time did not overlap with that of any other provider or involve time for any procedures.     Mason Tirado MD  Critical Care Medicine  [facility] - Cardiac Medical ICU

## 2023-02-13 NOTE — ASSESSMENT & PLAN NOTE
Meets sepsis criteria on presentation with leukocytosis and tachycardia  Pt reports abdominal pain and dysuria for the past 4-5 days  Some concern for possible cholecystitis on CT abdo  Source could be intra-abdominal vs urinary although UA not concerning for infection  CXR showing left sided atelectasis and pleural effusion which could be obscuring underlying infection    Plan:  - continue broad spectrum abx including atypical coverage for lung source   - blood culture : NGTD   - urine culture: NGTD  - given 2.5L IV fluids and initially responded, however subsequently became hypotensive; peripheral vasopressors with MAP goal > 65, wean as tolerated  - US abdomen to assess for cholecystitis  - seen by gen surg who recommended no surgical intervention in setting of likely metastatic pancreatic cancer and poor surgical candidacy; felt the inflammation of his gallbladder was likely from biliary obstruction from the pancreatic mass

## 2023-02-13 NOTE — PROVATION PATIENT INSTRUCTIONS
Discharge Summary/Instructions after an Endoscopic Procedure  Patient Name: Anthony Borges  Patient MRN: [id number]  Patient YOB: 1960 Monday, February 13, 2023  Isadora Chang MD  Dear patient,  As a result of recent federal legislation (The Federal Cures Act), you may   receive lab or pathology results from your procedure in your [facility]   account before your physician is able to contact you. Your physician or   their representative will relay the results to you with their   recommendations at their soonest availability.  Thank you,  RESTRICTIONS:  During your procedure today, you received medications for sedation.  These   medications may affect your judgment, balance and coordination.  Therefore,   for 24 hours, you have the following restrictions:   - DO NOT drive a car, operate machinery, make legal/financial decisions,   sign important papers or drink alcohol.    ACTIVITY:  Today: no heavy lifting, straining or running due to procedural   sedation/anesthesia.  The following day: return to full activity including work.  DIET:  Eat and drink normally unless instructed otherwise.     TREATMENT FOR COMMON SIDE EFFECTS:  - Mild abdominal pain, nausea, belching, bloating or excessive gas:  rest,   eat lightly and use a heating pad.  - Sore Throat: treat with throat lozenges and/or gargle with warm salt   water.  - Because air was used during the procedure, expelling large amounts of air   from your rectum or belching is normal.  - If a bowel prep was taken, you may not have a bowel movement for 1-3 days.    This is normal.  SYMPTOMS TO WATCH FOR AND REPORT TO YOUR PHYSICIAN:  1. Abdominal pain or bloating, other than gas cramps.  2. Chest pain.  3. Back pain.  4. Signs of infection such as: chills or fever occurring within 24 hours   after the procedure.  5. Rectal bleeding, which would show as bright red, maroon, or black stools.   (A tablespoon of blood from the rectum is not serious, especially  if   hemorrhoids are present.)  6. Vomiting.  7. Weakness or dizziness.  GO DIRECTLY TO THE NEAREST EMERGENCY ROOM IF YOU HAVE ANY OF THE FOLLOWING:      Difficulty breathing              Chills and/or fever over 101 F   Persistent vomiting and/or vomiting blood   Severe abdominal pain   Severe chest pain   Black, tarry stools   Bleeding- more than one tablespoon   Any other symptom or condition that you feel may need urgent attention  Your doctor recommends these additional instructions:  If any biopsies were taken, your doctors clinic will contact you in 1 to 2   weeks with any results.  - Return patient to ICU for ongoing care.   - The patient should not require a repeat ERCP unless clinical concern for a   possible occluded stent develops.  - Return to referring physician.   - Watch for pancreatitis, bleeding, perforation, and cholangitis.   - Continue IV antibiotics while inpatient.  - Refer to an oncologist and palliative care physician.  For questions, problems or results please call your physician - Isadora Chang MD at Work:  [phone].  [facility], EMERGENCY ROOM PHONE NUMBER: [phone]  IF A COMPLICATION OR EMERGENCY SITUATION ARISES AND YOU ARE UNABLE TO REACH   YOUR PHYSICIAN - GO DIRECTLY TO THE EMERGENCY ROOM.  Isadora Chang MD  2/13/2023 5:13:50 PM  This report has been verified and signed electronically.  Dear patient,  As a result of recent federal legislation (The Federal Cures Act), you may   receive lab or pathology results from your procedure in your [facility]   account before your physician is able to contact you. Your physician or   their representative will relay the results to you with their   recommendations at their soonest availability.  Thank you,  PROVATION

## 2023-02-13 NOTE — TREATMENT PLAN
Brief GI treatment plan    EUS/ERCP performed today.  Findings:      Pancreatic Mass biopsy with prelim positive for adenocarcinoma.  Biliary stent placed.      Please see full endoscopy report for details.    Recommendations:    -Await final pathology  -trend CBC, CMP, IN  -monitor for post-procedure cholangitis/pancreatitis  -continue GOC discussions per ICU  -continue IV ABx       GI will continue to follow.  Please call questions.    Jadiel Reddy MD  GI Fellow

## 2023-02-13 NOTE — PROVATION PATIENT INSTRUCTIONS
Discharge Summary/Instructions after an Endoscopic Procedure  Patient Name: Anthony Borges  Patient MRN: [id number]  Patient YOB: 1960 Monday, February 13, 2023  Isadora Chang MD  Dear patient,  As a result of recent federal legislation (The Federal Cures Act), you may   receive lab or pathology results from your procedure in your [facility]   account before your physician is able to contact you. Your physician or   their representative will relay the results to you with their   recommendations at their soonest availability.  Thank you,  RESTRICTIONS:  During your procedure today, you received medications for sedation.  These   medications may affect your judgment, balance and coordination.  Therefore,   for 24 hours, you have the following restrictions:   - DO NOT drive a car, operate machinery, make legal/financial decisions,   sign important papers or drink alcohol.    ACTIVITY:  Today: no heavy lifting, straining or running due to procedural   sedation/anesthesia.  The following day: return to full activity including work.  DIET:  Eat and drink normally unless instructed otherwise.     TREATMENT FOR COMMON SIDE EFFECTS:  - Mild abdominal pain, nausea, belching, bloating or excessive gas:  rest,   eat lightly and use a heating pad.  - Sore Throat: treat with throat lozenges and/or gargle with warm salt   water.  - Because air was used during the procedure, expelling large amounts of air   from your rectum or belching is normal.  - If a bowel prep was taken, you may not have a bowel movement for 1-3 days.    This is normal.  SYMPTOMS TO WATCH FOR AND REPORT TO YOUR PHYSICIAN:  1. Abdominal pain or bloating, other than gas cramps.  2. Chest pain.  3. Back pain.  4. Signs of infection such as: chills or fever occurring within 24 hours   after the procedure.  5. Rectal bleeding, which would show as bright red, maroon, or black stools.   (A tablespoon of blood from the rectum is not serious, especially  if   hemorrhoids are present.)  6. Vomiting.  7. Weakness or dizziness.  GO DIRECTLY TO THE NEAREST EMERGENCY ROOM IF YOU HAVE ANY OF THE FOLLOWING:      Difficulty breathing              Chills and/or fever over 101 F   Persistent vomiting and/or vomiting blood   Severe abdominal pain   Severe chest pain   Black, tarry stools   Bleeding- more than one tablespoon   Any other symptom or condition that you feel may need urgent attention  Your doctor recommends these additional instructions:  If any biopsies were taken, your doctors clinic will contact you in 1 to 2   weeks with any results.  - Return patient to ICU for ongoing care.   - Await path results.   - Refer to an oncologist and palliative care physician.   - Perform an ERCP today for biliary decompression.  For questions, problems or results please call your physician - Isadora Chang MD at Work:  [phone].  [facility], EMERGENCY ROOM PHONE NUMBER: [phone]  IF A COMPLICATION OR EMERGENCY SITUATION ARISES AND YOU ARE UNABLE TO REACH   YOUR PHYSICIAN - GO DIRECTLY TO THE EMERGENCY ROOM.  Isadora Chang MD  2/13/2023 5:02:38 PM  This report has been verified and signed electronically.  Dear patient,  As a result of recent federal legislation (The Federal Cures Act), you may   receive lab or pathology results from your procedure in your [facility]   account before your physician is able to contact you. Your physician or   their representative will relay the results to you with their   recommendations at their soonest availability.  Thank you,  PROVATION

## 2023-02-13 NOTE — ASSESSMENT & PLAN NOTE
Upon initial assessment, Pt was oriented to person, place, and month (did not know day or week or year)  On second assessment, remains alert but not oriented to place  CT head unremarkable for acute process  Altered mentation could be secondary to infection vs hyponatremia vs hyperammonemia     Plan:   - NPO for now   - follow-up SLP consult, unable to assess due to NPO status will retry assessment after procedure   - delirium precautions

## 2023-02-14 PROBLEM — E87.6 HYPOKALEMIA: Status: ACTIVE | Noted: 2023-02-14

## 2023-02-14 LAB
ALBUMIN SERPL BCP-MCNC: 1.7 G/DL (ref 3.5–5.2)
ALP SERPL-CCNC: 1135 U/L (ref 55–135)
ALT SERPL W/O P-5'-P-CCNC: 257 U/L (ref 10–44)
ANION GAP SERPL CALC-SCNC: 13 MMOL/L (ref 8–16)
ANION GAP SERPL CALC-SCNC: 14 MMOL/L (ref 8–16)
AST SERPL-CCNC: 261 U/L (ref 10–40)
BASOPHILS # BLD AUTO: 0.04 K/UL (ref 0–0.2)
BASOPHILS NFR BLD: 0.3 % (ref 0–1.9)
BILIRUB SERPL-MCNC: 25.3 MG/DL (ref 0.1–1)
BUN SERPL-MCNC: 7 MG/DL (ref 8–23)
BUN SERPL-MCNC: 9 MG/DL (ref 8–23)
CALCIUM SERPL-MCNC: 8.5 MG/DL (ref 8.7–10.5)
CALCIUM SERPL-MCNC: 8.6 MG/DL (ref 8.7–10.5)
CHLORIDE SERPL-SCNC: 89 MMOL/L (ref 95–110)
CHLORIDE SERPL-SCNC: 91 MMOL/L (ref 95–110)
CO2 SERPL-SCNC: 26 MMOL/L (ref 23–29)
CO2 SERPL-SCNC: 27 MMOL/L (ref 23–29)
CREAT SERPL-MCNC: 0.6 MG/DL (ref 0.5–1.4)
CREAT SERPL-MCNC: 0.6 MG/DL (ref 0.5–1.4)
DIFFERENTIAL METHOD: ABNORMAL
EOSINOPHIL # BLD AUTO: 0.1 K/UL (ref 0–0.5)
EOSINOPHIL NFR BLD: 0.5 % (ref 0–8)
ERYTHROCYTE [DISTWIDTH] IN BLOOD BY AUTOMATED COUNT: 21.8 % (ref 11.5–14.5)
EST. GFR  (NO RACE VARIABLE): >60 ML/MIN/1.73 M^2
EST. GFR  (NO RACE VARIABLE): >60 ML/MIN/1.73 M^2
GLUCOSE SERPL-MCNC: 111 MG/DL (ref 70–110)
GLUCOSE SERPL-MCNC: 125 MG/DL (ref 70–110)
HCT VFR BLD AUTO: 30.7 % (ref 40–54)
HGB BLD-MCNC: 9.7 G/DL (ref 14–18)
IMM GRANULOCYTES # BLD AUTO: 0.12 K/UL (ref 0–0.04)
IMM GRANULOCYTES NFR BLD AUTO: 0.8 % (ref 0–0.5)
LIPASE SERPL-CCNC: 136 U/L (ref 4–60)
LYMPHOCYTES # BLD AUTO: 2.1 K/UL (ref 1–4.8)
LYMPHOCYTES NFR BLD: 13.8 % (ref 18–48)
MAGNESIUM SERPL-MCNC: 1.6 MG/DL (ref 1.6–2.6)
MCH RBC QN AUTO: 21.7 PG (ref 27–31)
MCHC RBC AUTO-ENTMCNC: 31.6 G/DL (ref 32–36)
MCV RBC AUTO: 69 FL (ref 82–98)
MONOCYTES # BLD AUTO: 1.3 K/UL (ref 0.3–1)
MONOCYTES NFR BLD: 9 % (ref 4–15)
NEUTROPHILS # BLD AUTO: 11.3 K/UL (ref 1.8–7.7)
NEUTROPHILS NFR BLD: 75.6 % (ref 38–73)
NRBC BLD-RTO: 0 /100 WBC
PHOSPHATE SERPL-MCNC: 2.7 MG/DL (ref 2.7–4.5)
PLATELET # BLD AUTO: 395 K/UL (ref 150–450)
PMV BLD AUTO: 10.8 FL (ref 9.2–12.9)
POCT GLUCOSE: 110 MG/DL (ref 70–110)
POCT GLUCOSE: 115 MG/DL (ref 70–110)
POCT GLUCOSE: 127 MG/DL (ref 70–110)
POCT GLUCOSE: 233 MG/DL (ref 70–110)
POTASSIUM SERPL-SCNC: 3.1 MMOL/L (ref 3.5–5.1)
POTASSIUM SERPL-SCNC: 3.7 MMOL/L (ref 3.5–5.1)
PROT SERPL-MCNC: 5.8 G/DL (ref 6–8.4)
RBC # BLD AUTO: 4.47 M/UL (ref 4.6–6.2)
SODIUM SERPL-SCNC: 129 MMOL/L (ref 136–145)
SODIUM SERPL-SCNC: 131 MMOL/L (ref 136–145)
VANCOMYCIN SERPL-MCNC: 18.3 UG/ML
WBC # BLD AUTO: 14.94 K/UL (ref 3.9–12.7)

## 2023-02-14 PROCEDURE — 80048 BASIC METABOLIC PNL TOTAL CA: CPT | Mod: XB | Performed by: STUDENT IN AN ORGANIZED HEALTH CARE EDUCATION/TRAINING PROGRAM

## 2023-02-14 PROCEDURE — 25000003 PHARM REV CODE 250: Performed by: STUDENT IN AN ORGANIZED HEALTH CARE EDUCATION/TRAINING PROGRAM

## 2023-02-14 PROCEDURE — 80202 ASSAY OF VANCOMYCIN: CPT | Performed by: EMERGENCY MEDICINE

## 2023-02-14 PROCEDURE — 63600175 PHARM REV CODE 636 W HCPCS

## 2023-02-14 PROCEDURE — 94761 N-INVAS EAR/PLS OXIMETRY MLT: CPT

## 2023-02-14 PROCEDURE — 25000003 PHARM REV CODE 250

## 2023-02-14 PROCEDURE — 99233 PR SUBSEQUENT HOSPITAL CARE,LEVL III: ICD-10-PCS | Mod: 95,,, | Performed by: CLINICAL NURSE SPECIALIST

## 2023-02-14 PROCEDURE — 99291 PR CRITICAL CARE, E/M 30-74 MINUTES: ICD-10-PCS | Mod: ,,, | Performed by: EMERGENCY MEDICINE

## 2023-02-14 PROCEDURE — 83690 ASSAY OF LIPASE: CPT | Performed by: STUDENT IN AN ORGANIZED HEALTH CARE EDUCATION/TRAINING PROGRAM

## 2023-02-14 PROCEDURE — 63600175 PHARM REV CODE 636 W HCPCS: Performed by: STUDENT IN AN ORGANIZED HEALTH CARE EDUCATION/TRAINING PROGRAM

## 2023-02-14 PROCEDURE — 99291 CRITICAL CARE FIRST HOUR: CPT | Mod: ,,, | Performed by: EMERGENCY MEDICINE

## 2023-02-14 PROCEDURE — 99497 ADVNCD CARE PLAN 30 MIN: CPT | Mod: ,,, | Performed by: CLINICAL NURSE SPECIALIST

## 2023-02-14 PROCEDURE — 20000000 HC ICU ROOM

## 2023-02-14 PROCEDURE — 85025 COMPLETE CBC W/AUTO DIFF WBC: CPT | Performed by: STUDENT IN AN ORGANIZED HEALTH CARE EDUCATION/TRAINING PROGRAM

## 2023-02-14 PROCEDURE — 80053 COMPREHEN METABOLIC PANEL: CPT | Performed by: STUDENT IN AN ORGANIZED HEALTH CARE EDUCATION/TRAINING PROGRAM

## 2023-02-14 PROCEDURE — 84100 ASSAY OF PHOSPHORUS: CPT | Performed by: STUDENT IN AN ORGANIZED HEALTH CARE EDUCATION/TRAINING PROGRAM

## 2023-02-14 PROCEDURE — 99497 PR ADVNCD CARE PLAN 30 MIN: ICD-10-PCS | Mod: ,,, | Performed by: CLINICAL NURSE SPECIALIST

## 2023-02-14 PROCEDURE — 83735 ASSAY OF MAGNESIUM: CPT | Performed by: STUDENT IN AN ORGANIZED HEALTH CARE EDUCATION/TRAINING PROGRAM

## 2023-02-14 PROCEDURE — 92610 EVALUATE SWALLOWING FUNCTION: CPT

## 2023-02-14 PROCEDURE — 99233 SBSQ HOSP IP/OBS HIGH 50: CPT | Mod: 95,,, | Performed by: CLINICAL NURSE SPECIALIST

## 2023-02-14 RX ORDER — NOREPINEPHRINE BITARTRATE/D5W 4MG/250ML
0-.2 PLASTIC BAG, INJECTION (ML) INTRAVENOUS CONTINUOUS
Status: DISCONTINUED | OUTPATIENT
Start: 2023-02-14 | End: 2023-02-15

## 2023-02-14 RX ORDER — ONDANSETRON 2 MG/ML
8 INJECTION INTRAMUSCULAR; INTRAVENOUS ONCE
Status: COMPLETED | OUTPATIENT
Start: 2023-02-14 | End: 2023-02-14

## 2023-02-14 RX ORDER — MIDODRINE HYDROCHLORIDE 5 MG/1
5 TABLET ORAL 3 TIMES DAILY
Status: DISCONTINUED | OUTPATIENT
Start: 2023-02-14 | End: 2023-02-15 | Stop reason: HOSPADM

## 2023-02-14 RX ORDER — POTASSIUM CHLORIDE 20 MEQ/1
40 TABLET, EXTENDED RELEASE ORAL ONCE
Status: COMPLETED | OUTPATIENT
Start: 2023-02-14 | End: 2023-02-14

## 2023-02-14 RX ORDER — POTASSIUM CHLORIDE 7.45 MG/ML
10 INJECTION INTRAVENOUS
Status: DISCONTINUED | OUTPATIENT
Start: 2023-02-14 | End: 2023-02-14

## 2023-02-14 RX ORDER — MAGNESIUM SULFATE HEPTAHYDRATE 40 MG/ML
2 INJECTION, SOLUTION INTRAVENOUS ONCE
Status: COMPLETED | OUTPATIENT
Start: 2023-02-14 | End: 2023-02-14

## 2023-02-14 RX ADMIN — HEPARIN SODIUM 5000 UNITS: 5000 INJECTION INTRAVENOUS; SUBCUTANEOUS at 06:02

## 2023-02-14 RX ADMIN — OXYCODONE HYDROCHLORIDE AND ACETAMINOPHEN 500 MG: 500 TABLET ORAL at 08:02

## 2023-02-14 RX ADMIN — MIDODRINE HYDROCHLORIDE 5 MG: 5 TABLET ORAL at 10:02

## 2023-02-14 RX ADMIN — LEVETIRACETAM 500 MG: 500 TABLET, FILM COATED ORAL at 09:02

## 2023-02-14 RX ADMIN — ONDANSETRON 8 MG: 2 INJECTION INTRAMUSCULAR; INTRAVENOUS at 10:02

## 2023-02-14 RX ADMIN — HEPARIN SODIUM 5000 UNITS: 5000 INJECTION INTRAVENOUS; SUBCUTANEOUS at 09:02

## 2023-02-14 RX ADMIN — HEPARIN SODIUM 5000 UNITS: 5000 INJECTION INTRAVENOUS; SUBCUTANEOUS at 02:02

## 2023-02-14 RX ADMIN — PIPERACILLIN SODIUM AND TAZOBACTAM SODIUM 4.5 G: 4; .5 INJECTION, POWDER, FOR SOLUTION INTRAVENOUS at 10:02

## 2023-02-14 RX ADMIN — LEVETIRACETAM 500 MG: 500 TABLET, FILM COATED ORAL at 08:02

## 2023-02-14 RX ADMIN — MAGNESIUM SULFATE 2 G: 2 INJECTION INTRAVENOUS at 04:02

## 2023-02-14 RX ADMIN — BRIMONIDINE TARTRATE 1 DROP: 2 SOLUTION OPHTHALMIC at 08:02

## 2023-02-14 RX ADMIN — BRIMONIDINE TARTRATE 1 DROP: 2 SOLUTION OPHTHALMIC at 09:02

## 2023-02-14 RX ADMIN — MIDODRINE HYDROCHLORIDE 5 MG: 5 TABLET ORAL at 09:02

## 2023-02-14 RX ADMIN — FAMOTIDINE 20 MG: 20 TABLET, FILM COATED ORAL at 08:02

## 2023-02-14 RX ADMIN — INSULIN ASPART 2 UNITS: 100 INJECTION, SOLUTION INTRAVENOUS; SUBCUTANEOUS at 12:02

## 2023-02-14 RX ADMIN — PIPERACILLIN SODIUM AND TAZOBACTAM SODIUM 4.5 G: 4; .5 INJECTION, POWDER, FOR SOLUTION INTRAVENOUS at 02:02

## 2023-02-14 RX ADMIN — MIDODRINE HYDROCHLORIDE 5 MG: 5 TABLET ORAL at 02:02

## 2023-02-14 RX ADMIN — PIPERACILLIN SODIUM AND TAZOBACTAM SODIUM 4.5 G: 4; .5 INJECTION, POWDER, FOR SOLUTION INTRAVENOUS at 06:02

## 2023-02-14 RX ADMIN — POTASSIUM CHLORIDE 40 MEQ: 1500 TABLET, EXTENDED RELEASE ORAL at 04:02

## 2023-02-14 NOTE — ASSESSMENT & PLAN NOTE
Upon initial assessment, Pt was oriented to person, place, and month (did not know day or week or year)  On second assessment, remains alert but not oriented to place  CT head unremarkable for acute process  Altered mentation could be secondary to infection vs hyponatremia vs hyperammonemia     Plan:   - advance diet as tolerated  - passed bedside swallow study  - delirium precautions

## 2023-02-14 NOTE — PT/OT/SLP EVAL
Speech Language Pathology Evaluation  Bedside Swallow  Discharge Summary    Patient Name:  Anthony Borges   MRN:  [id number]  Admitting Diagnosis: Pancreatic mass    Recommendations:                 General Recommendations:  Follow-up not indicated  Diet recommendations:  Regular, Thin   Aspiration Precautions: Small bites/sips   General Precautions: Standard,    Communication strategies:  none    History:     Past Medical History:   Diagnosis Date    Alcohol abuse     Aphasia     COPD (chronic obstructive pulmonary disease)     GERD (gastroesophageal reflux disease)     Hypertension     Hypokalemia     Opioid abuse     Stroke     Unspecified glaucoma        History reviewed. No pertinent surgical history.      Subjective     Patient awake and alert. Eating breakfast upon entry.     Pain/Comfort:   No c/o pain    Respiratory Status: Room air    Objective:     Oral Musculature Evaluation  Oral Musculature: WFL    Bedside Swallow Eval:   Consistencies Assessed:  Thin liquids via straw sips x9  Solids x3      Oral Phase:   WFL    Pharyngeal Phase:   no overt clinical signs/symptoms of aspiration  no overt clinical signs/symptoms of pharyngeal dysphagia    Compensatory Strategies  None      Assessment:     Anthony Borges is a 62 y.o. male with an SLP diagnosis of  no oropharyngeal dysphagia .  He presents with no further acute speech therapy needs.    Plan:     Patient to be seen:      Plan of Care expires:     Plan of Care reviewed with:  patient   SLP Follow-Up:  No       Discharge recommendations:  nursing facility, basic   Barriers to Discharge:  None    Time Tracking:     SLP Treatment Date:   02/14/23  Speech Start Time:  1009  Speech Stop Time:  1016     Speech Total Time (min):  7 min    Billable Minutes: Eval Swallow and Oral Function 7    02/14/2023

## 2023-02-14 NOTE — PLAN OF CARE
CM met with patient and family this am. Family advised patient can return to [facility] and transition to hospice. PARRISH spoke with Noemy at [facility] admissions. Noemy advised that they use [facility] at their facility. CM contacted [facility] and spoke to Christo for required information. CM sent hospice referral vial [facility] to [facility]. CM to follow for acceptance and for discharge back to [facility].      Elies Arndt RN     [phone]

## 2023-02-14 NOTE — SUBJECTIVE & OBJECTIVE
Interval History/Significant Events: No acute events overnight. Tolerated ERCP w/ stent placement well.     Review of Systems   Constitutional:  Negative for fever.   Cardiovascular:  Negative for chest pain.   Gastrointestinal:  Negative for abdominal distention, abdominal pain, nausea and vomiting.   Objective:     Vital Signs (Most Recent):  Temp: 97.7 °F (36.5 °C) (02/14/23 0701)  Pulse: 104 (02/14/23 0701)  Resp: 12 (02/14/23 0701)  BP: 100/62 (02/14/23 0701)  SpO2: 97 % (02/14/23 0701) Vital Signs (24h Range):  Temp:  [97.7 °F (36.5 °C)-98.2 °F (36.8 °C)] 97.7 °F (36.5 °C)  Pulse:  [104-123] 104  Resp:  [11-17] 12  SpO2:  [95 %-98 %] 97 %  BP: ()/(54-67) 100/62   Weight: 92.1 kg (203 lb)  Body mass index is 26.78 kg/m².      Intake/Output Summary (Last 24 hours) at 2/14/2023 0812  Last data filed at 2/14/2023 0600  Gross per 24 hour   Intake 740.32 ml   Output 1045 ml   Net -304.68 ml       Physical Exam  Vitals and nursing note reviewed.   Constitutional:       Appearance: He is cachectic. He is ill-appearing.   HENT:      Mouth/Throat:      Mouth: Mucous membranes are moist.   Eyes:      General: Scleral icterus present.      Extraocular Movements: Extraocular movements intact.      Pupils: Pupils are equal, round, and reactive to light.   Cardiovascular:      Rate and Rhythm: Normal rate.      Pulses: Normal pulses.   Pulmonary:      Effort: Pulmonary effort is normal. No respiratory distress.   Abdominal:      General: There is distension.      Palpations: Abdomen is soft.      Tenderness: There is no abdominal tenderness. There is no guarding or rebound.   Musculoskeletal:      Right lower leg: Edema present.      Left lower leg: Edema present.      Comments: Chronic R sided contractures    Skin:     General: Skin is dry.      Capillary Refill: Capillary refill takes less than 2 seconds.      Coloration: Skin is jaundiced.   Neurological:      Mental Status: He is alert and oriented to person,  place, and time. He is confused.      GCS: GCS eye subscore is 4. GCS verbal subscore is 4. GCS motor subscore is 6.      Cranial Nerves: No facial asymmetry.      Comments: Improved insight. Still mildly confused but asking questions and answering more appropriately    Psychiatric:         Mood and Affect: Mood normal.       Vents:     Lines/Drains/Airways       Drain  Duration                  Urethral Catheter 02/11/23 1624 Straight-tip 2 days              Peripheral Intravenous Line  Duration                  Peripheral IV - Single Lumen 02/12/23 1255 20 G Distal;Left;Posterior Forearm 1 day         Peripheral IV - Single Lumen 02/13/23 1215 20 G;1 3/4 in Left;Anterior Forearm <1 day                  Significant Labs:    CBC/Anemia Profile:  Recent Labs   Lab 02/13/23  0506 02/14/23  0213   WBC 19.89* 14.94*   HGB 9.6* 9.7*   HCT 29.8* 30.7*    395   MCV 67* 69*   RDW 20.9* 21.8*        Chemistries:  Recent Labs   Lab 02/13/23  0506 02/14/23  0213   * 131*   K 3.1* 3.1*   CL 91* 91*   CO2 26 26   BUN 5* 7*   CREATININE 0.5 0.6   CALCIUM 8.2* 8.5*   ALBUMIN 1.8* 1.7*   PROT 5.3* 5.8*   BILITOT 27.4* 25.3*   ALKPHOS 1,197* 1,135*   * 257*   * 261*   MG 1.6 1.6   PHOS 2.7 2.7       All pertinent labs within the past 24 hours have been reviewed.    Significant Imaging:  I have reviewed all pertinent imaging results/findings within the past 24 hours.

## 2023-02-14 NOTE — PLAN OF CARE
CM sent referral to [facility] via [facility]. Patient will return to [facility] when mendically ready with hospice.      Elise Arndt RN     [phone]

## 2023-02-14 NOTE — ASSESSMENT & PLAN NOTE
Impression: Pt is a 63 y/o male with  history of CVA and right hemparesis. Patient admitted from his nursing home with jaundice, AMS, and tachycardia. On arrival, Tbili 29.3 with associated transamintis. 2.3 cm mass noted in head of pancreas with associated biliary dilation and suspcious intrabdominal LAD. Pt to have ERCP today. Pt is alert, oriented to person, place, and aware he has a pancreatic mass. Pt is a DNR.    Reason for consult: ACP/hospice. Discussed case with Dr. Hunter  Goals of care/ACP    Today: Met with pt's sister, Dalia, who is in from [place]. Pt's brother and brother-in-law at bedside. Pt and family aware of pancreatic cancer. Pt and family want pt to go back to [facility] with hospice. Per pt, if he could not make medical decisions, he would want his sister Dalia to make his decisions. Hospice care discussed at NH. Did let pt and family know pt it on Levophed for his low BP. Let family know he would have to be weaned off levo to go back to nursing home.     2/13/23  No family at bedside. Per pt he lives at a NH and mostly in bed dute to effects of past stroke. Pt is aware of pancreatic mass. Per pt, he has a daughter and a sister who lives in [place]. CC team has spoken to pt's sister in [place] for ACP planning.     Called and spoke to pt's sister in [place]. She will be coming to visit  Tomorrow around 900. Per pt's sister, pt's daughter is also aware of medical situation and will be visiting.   Pt's sister reports she is MPOA. No paperwork in chart. Pt's sister to bring. Pt's NOK is his adult kid(s) if no MPOA paperwork completed.     Code status: DNR    Called and updated Dr. Hunter on above info.     Symptom management:    Debility:  Pt bed-bound.   Pt has  Right sided weakness.    Repositioning per bedside nurse    Nausea:  Pt reports intermittent nausea    Meds:    Would have zofran 4 mg q 8 hrs prn.       Plan:  Pt and family at bedside are open to hospice at NH.   Barrier to d/c at this  time is pt still requiring levophed.

## 2023-02-14 NOTE — ASSESSMENT & PLAN NOTE
Presented from nursing home due to jaundice and abdominal pain  On admission Tbili 29, Alk phos 1600, , Alt 393  CT abdomen/pelvis from 2/11 showing large mass in the pancreatic head, severe dilation of pancreatic duct and intra/extra hepatic biliary ducts as well as prominent lymph nodes concerning for metastatic disease  CA-A 19-9 ordered by ED and found to be elevated [id number]  Seen by gen surg who advised Pt is not a surgical candidate and is unlikely to tolerate a Whipple procedure  ERCP on 02/13- Pancreatic Mass biopsy with prelim positive for adenocarcinoma.  Biliary stent placed.      Plan:  - Family would like to pursue inpatient hospice. Social work team is working on placement   - No current signs of post op pancreatitis, lipase ordered and pending, WBC down trending, no abd TTP  - tolerated beside swallow okay to advance diet  - heme/onc consulted:    Picture is consistent with new diagnosis of pancreatic adenocarcinoma.    Regardless of metastatic state he is not currently fit for either palliative or neoadjuvant chemotherapy  - analgesia prn  - hold home atorvastatin 2/2 transaminitis  - continue to trend LFTs

## 2023-02-14 NOTE — PROGRESS NOTES
[facility] - Cardiac Medical ICU  Critical Care Medicine  Progress Note    Patient Name: Anthony Borges  MRN: [id number]  Admission Date: 2/11/2023  Hospital Length of Stay: 3 days  Code Status: DNR  Attending Provider: Mando Elliott MD  Primary Care Provider: Bean Pearce MD   Principal Problem: Pancreatic mass    Subjective:     HPI:  Mr. Anthony Borges is a 62 y.o. male with a PMHx of HTN, COPD, and CVA (residual RUE contracture and RLE weakness) who presented from a nursing home after the staff noticed he was jaundiced a day ago. Nursing staff also reports confusion and fatigue. HPI limited as patient is confused but he reports abdominal pain for the last 5 days and dysuria for the last 4 days. Denies any fevers, chills, chest pain, SOB, cough, nausea, diarrhea, or vomiting. He is prescribed an albuterol inhaler for COPD, denies sleeping with a bipap at night.     In the ED patient was afebrile, tachycardic to 110, BP 90/64, and was on RA. Labs significant for a WBC 16.1, Na 124, K 2.8, Alk phos 1600, Tbili 29, , , Ammonia 67. BNP, trop, and lacate x2 were normal. Initial ABG with a pH of 7.15 pCO2 of 76, repeat with a pH 7.54 and pCO2 of 33 without the use of bipap. Patient was given a dose of vanz/zosyn and was given 2.4L NS. CT head with no acute findings. CT abdomen with a large mass in the pancreatic head resulting in severe pancreatic ductal dilation and severe intra and extrahepatic biliary ductal dilation. Gen surg consulted and stated patient not a surgical candidate at this time. MICU consulted for sepsis and hyponatremia.      Hospital/ICU Course:  2/14 Family at bedside. Extensive discussion about patient's current diagnosis and goals of care. Patient's family would like to pursue hospice. Social work is in the process of finding placement for inpatient hospice.     Interval History/Significant Events: No acute events overnight. Tolerated ERCP w/ stent placement well.     Review  of Systems   Constitutional:  Negative for fever.   Cardiovascular:  Negative for chest pain.   Gastrointestinal:  Negative for abdominal distention, abdominal pain, nausea and vomiting.   Objective:     Vital Signs (Most Recent):  Temp: 97.7 °F (36.5 °C) (02/14/23 0701)  Pulse: 104 (02/14/23 0701)  Resp: 12 (02/14/23 0701)  BP: 100/62 (02/14/23 0701)  SpO2: 97 % (02/14/23 0701) Vital Signs (24h Range):  Temp:  [97.7 °F (36.5 °C)-98.2 °F (36.8 °C)] 97.7 °F (36.5 °C)  Pulse:  [104-123] 104  Resp:  [11-17] 12  SpO2:  [95 %-98 %] 97 %  BP: ()/(54-67) 100/62   Weight: 92.1 kg (203 lb)  Body mass index is 26.78 kg/m².      Intake/Output Summary (Last 24 hours) at 2/14/2023 0812  Last data filed at 2/14/2023 0600  Gross per 24 hour   Intake 740.32 ml   Output 1045 ml   Net -304.68 ml       Physical Exam  Vitals and nursing note reviewed.   Constitutional:       Appearance: He is cachectic. He is ill-appearing.   HENT:      Mouth/Throat:      Mouth: Mucous membranes are moist.   Eyes:      General: Scleral icterus present.      Extraocular Movements: Extraocular movements intact.      Pupils: Pupils are equal, round, and reactive to light.   Cardiovascular:      Rate and Rhythm: Normal rate.      Pulses: Normal pulses.   Pulmonary:      Effort: Pulmonary effort is normal. No respiratory distress.   Abdominal:      General: There is distension.      Palpations: Abdomen is soft.      Tenderness: There is no abdominal tenderness. There is no guarding or rebound.   Musculoskeletal:      Right lower leg: Edema present.      Left lower leg: Edema present.      Comments: Chronic R sided contractures    Skin:     General: Skin is dry.      Capillary Refill: Capillary refill takes less than 2 seconds.      Coloration: Skin is jaundiced.   Neurological:      Mental Status: He is alert and oriented to person, place, and time. He is confused.      GCS: GCS eye subscore is 4. GCS verbal subscore is 4. GCS motor subscore is 6.       Cranial Nerves: No facial asymmetry.      Comments: Improved insight. Still mildly confused but asking questions and answering more appropriately    Psychiatric:         Mood and Affect: Mood normal.       Vents:     Lines/Drains/Airways       Drain  Duration                  Urethral Catheter 02/11/23 1624 Straight-tip 2 days              Peripheral Intravenous Line  Duration                  Peripheral IV - Single Lumen 02/12/23 1255 20 G Distal;Left;Posterior Forearm 1 day         Peripheral IV - Single Lumen 02/13/23 1215 20 G;1 3/4 in Left;Anterior Forearm <1 day                  Significant Labs:    CBC/Anemia Profile:  Recent Labs   Lab 02/13/23  0506 02/14/23  0213   WBC 19.89* 14.94*   HGB 9.6* 9.7*   HCT 29.8* 30.7*    395   MCV 67* 69*   RDW 20.9* 21.8*        Chemistries:  Recent Labs   Lab 02/13/23  0506 02/14/23  0213   * 131*   K 3.1* 3.1*   CL 91* 91*   CO2 26 26   BUN 5* 7*   CREATININE 0.5 0.6   CALCIUM 8.2* 8.5*   ALBUMIN 1.8* 1.7*   PROT 5.3* 5.8*   BILITOT 27.4* 25.3*   ALKPHOS 1,197* 1,135*   * 257*   * 261*   MG 1.6 1.6   PHOS 2.7 2.7       All pertinent labs within the past 24 hours have been reviewed.    Significant Imaging:  I have reviewed all pertinent imaging results/findings within the past 24 hours.    ABG  Recent Labs   Lab 02/11/23  2147   PH 7.544*   PO2 63*   PCO2 33.1*   HCO3 28.5*   BE 6     Assessment/Plan:     Neuro  Acute encephalopathy  Upon initial assessment, Pt was oriented to person, place, and month (did not know day or week or year)  On second assessment, remains alert but not oriented to place  CT head unremarkable for acute process  Altered mentation could be secondary to infection vs hyponatremia vs hyperammonemia     Plan:   - advance diet as tolerated  - passed bedside swallow study  - delirium precautions    History of CVA (cerebrovascular accident)  Previous 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  Bedbound at  baseline and lives in NH     Plan:  - continue home keppra  - seizure precautions   - turn Pt every 2 hours  - pressure ulcer precautions per nursing    Pulmonary  Chronic obstructive lung disease  On albuterol PRN outpatient   No signs of wheezing or COPD exacerbation   Initial ABG with a pH of 7.15 and CO2 76, repeat pH 7.54 and pCO2 33 without bipap  Saturating > 92% on RA    Plan:  - PRN albuterol     Cardiac/Vascular  Hyperlipidemia  Hold home statin in setting of transaminitis     Renal/  Hyponatremia  Hyponatremic on presentation with Na 124  Baseline ~ 140  Also has low chloride  Pt oriented to person, place, and month but does appear somewhat confused; unclear if baseline given h/o CVA or 2/2 likely locally metastatic disease, infection, and elevated ammonia, etc vs hyponatremia   Hyponatremia possibly 2/2 malnutrition, poor PO intake in setting of abdominal pain  Could also be pseudohyponatremia in setting of jaundice   Na improved to 127 on blood gas after administration of IV fluids    Plan:  - NA now 129  - monitor Na Q6h  - hold off on further methods to correct will monitor as the patient is able to eat     ID  Sepsis  Meets sepsis criteria on presentation with leukocytosis and tachycardia  Pt reports abdominal pain and dysuria for the past 4-5 days  Some concern for possible cholecystitis on CT abdo  Source could be intra-abdominal vs urinary although UA not concerning for infection  CXR showing left sided atelectasis and pleural effusion which could be obscuring underlying infection    Plan:  - continue zosyn for antibiotic coverage, will discharge home on Augmentin   - wean levo, started on midodrine 5 mg TID.   - blood culture : NGTD   - urine culture: NGTD  - given 2.5L IV fluids and initially responded, however subsequently became hypotensive; peripheral vasopressors with MAP goal > 65, wean as tolerated      Oncology  Elevated CA 19-9 level  See pancreatic  mass    Endocrine  Hyperammonemia  Ammonia elevated 67 on admission  Could have element of liver disease and hepatic encephalopathy contributing to altered mentation     Plan:  - lactulose 20g TID  - adjust dose to achieve 2 to 3 soft stools/day       GI  * Pancreatic mass  Presented from nursing home due to jaundice and abdominal pain  On admission Tbili 29, Alk phos 1600, , Alt 393  CT abdomen/pelvis from 2/11 showing large mass in the pancreatic head, severe dilation of pancreatic duct and intra/extra hepatic biliary ducts as well as prominent lymph nodes concerning for metastatic disease  CA-A 19-9 ordered by ED and found to be elevated 65501  Seen by gen surg who advised Pt is not a surgical candidate and is unlikely to tolerate a Whipple procedure  ERCP on 02/13- Pancreatic Mass biopsy with prelim positive for adenocarcinoma.  Biliary stent placed.      Plan:  - Family would like to pursue inpatient hospice. Social work team is working on placement   - No current signs of post op pancreatitis, lipase ordered and pending, WBC down trending, no abd TTP  - tolerated beside swallow okay to advance diet  - heme/onc consulted:    Picture is consistent with new diagnosis of pancreatic adenocarcinoma.    Regardless of metastatic state he is not currently fit for either palliative or neoadjuvant chemotherapy  - analgesia prn  - hold home atorvastatin 2/2 transaminitis  - continue to trend LFTs      Transaminitis  See pancreatitic mass    Gastroesophageal reflux disease  Continue home famotidine        Critical Care Daily Checklist:    A: Awake: RASS Goal/Actual Goal:    Actual: Morris Agitation Sedation Scale (RASS): Alert and calm   B: Spontaneous Breathing Trial Performed?     C: SAT & SBT Coordinated?  NA                   D: Delirium: CAM-ICU Overall CAM-ICU: Negative   E: Early Mobility Performed? Yes   F: Feeding Goal:    Status:     Current Diet Order   Procedures    Diet Adult Regular (IDDSI Level 7)       AS: Analgesia/Sedation none   T: Thromboembolic Prophylaxis heparin   H: HOB > 300 Yes   U: Stress Ulcer Prophylaxis (if needed) Home famotidine   G: Glucose Control none   B: Bowel Function Stool Occurrence: 1   I: Indwelling Catheter (Lines & Burns) Necessity 2 PIV, burns   D: De-escalation of Antimicrobials/Pharmacotherapies Continue zosyn     Plan for the day/ETD Continue ICU management     Code Status:  Family/Goals of Care: DNR         Critical secondary to Patient has a condition that poses threat to life and bodily function: sepsis      Critical care was time spent personally by me on the following activities: development of treatment plan with patient or surrogate and bedside caregivers, discussions with consultants, evaluation of patient's response to treatment, examination of patient, ordering and performing treatments and interventions, ordering and review of laboratory studies, ordering and review of radiographic studies, pulse oximetry, re-evaluation of patient's condition. This critical care time did not overlap with that of any other provider or involve time for any procedures.     Mason Tirado MD  Critical Care Medicine  [facility] - Cardiac Medical ICU

## 2023-02-14 NOTE — ASSESSMENT & PLAN NOTE
Hyponatremic on presentation with Na 124  Baseline ~ 140  Also has low chloride  Pt oriented to person, place, and month but does appear somewhat confused; unclear if baseline given h/o CVA or 2/2 likely locally metastatic disease, infection, and elevated ammonia, etc vs hyponatremia   Hyponatremia possibly 2/2 malnutrition, poor PO intake in setting of abdominal pain  Could also be pseudohyponatremia in setting of jaundice   Na improved to 127 on blood gas after administration of IV fluids    Plan:  - NA now 129  - monitor Na Q6h  - hold off on further methods to correct will monitor as the patient is able to eat

## 2023-02-14 NOTE — ASSESSMENT & PLAN NOTE
Meets sepsis criteria on presentation with leukocytosis and tachycardia  Pt reports abdominal pain and dysuria for the past 4-5 days  Some concern for possible cholecystitis on CT abdo  Source could be intra-abdominal vs urinary although UA not concerning for infection  CXR showing left sided atelectasis and pleural effusion which could be obscuring underlying infection    Plan:  - continue zosyn for antibiotic coverage, will discharge home on Augmentin   - wean levo, started on midodrine 5 mg TID.   - blood culture : NGTD   - urine culture: NGTD  - given 2.5L IV fluids and initially responded, however subsequently became hypotensive; peripheral vasopressors with MAP goal > 65, wean as tolerated

## 2023-02-14 NOTE — HOSPITAL COURSE
2/14 Family at bedside. Extensive discussion about patient's current diagnosis and goals of care. Patient's family would like to pursue hospice. SW appreciated. Plan is to DC to [place]  with [facility]). Will prescribed zofran prn for nausea, continue midodrine for soft Bps, and finish abx course with 6 more days of augmentin.

## 2023-02-14 NOTE — PLAN OF CARE
NURSING HOME ORDERS    02[facility]15/2023  [facility]  [address] [facility] [address]  [facility] [facility] [address]  Dept: [phone]  Loc: [phone]     Admit to Nursing Home:  admit to hospice    Diagnoses:  Active Hospital Problems    Diagnosis  POA    *Pancreatic mass [K86.89]  Yes    Hypokalemia [E87.6]  No     repleted with 40 meq PO potassium. Repeat BMP pending      Palliative care encounter [Z51.5]  Not Applicable    Nausea [R11.0]  Unknown    Goals of care, counseling/discussion [Z71.89]  Not Applicable    Advance care planning [Z71.89]  Not Applicable    Transaminitis [R74.01]  Unknown    Elevated CA 19-9 level [R97.8]  Unknown    Hyperammonemia [E72.20]  Unknown    Acute encephalopathy [G93.40]  Unknown    Hyponatremia [E87.1]  Yes    Sepsis [A41.9]  Unknown    History of CVA (cerebrovascular accident) [Z86.73]  Not Applicable    Chronic obstructive lung disease [J44.9]  Yes    Gastroesophageal reflux disease [K21.9]  Yes    Hyperlipidemia [E78.5]  Yes      Resolved Hospital Problems    Diagnosis Date Resolved POA    Cholelithiasis [K80.20] 02/12/2023 Unknown    Alkaline phosphatase elevation [R74.8] 02/12/2023 Unknown    Altered mental state [R41.82] 02/12/2023 Unknown       Patient is homebound due to:  Pancreatic mass    Allergies:Review of patient's allergies indicates:  No Known Allergies    Vitals:  Every shift    Diet: regular diet    Activities:   Activity as tolerated    Goals of Care Treatment Preferences:  Code Status: DNR    Health care agent: Pt's adult children are NOK  Health care agent number: No value filed.    Living Will: Yes     What is most important right now is to focus on comfort and QOL .  Accordingly, we have decided that the best plan to meet the patient's goals includes enrolling in hospice care.      Labs:  none    Nursing Precautions:  Fall and Pressure ulcer prevention    Consults:   PT to evaluate and treat- 3 times a week, Wound Care, and  Nutrition to evaluate and recommend diet     Miscellaneous Care: Roca Care: Empty Roca bag every shift.  Change Roca every month  Routine Skin for Bedridden Patients:  Apply moisture barrier cream to all                     Diabetes Care:  Report CBG < 60 or > 350 to physician.      Medications: Discontinue all previous medication orders, if any. See new list below.     Medication List        START taking these medications      amoxicillin-clavulanate 875-125mg 875-125 mg per tablet  Commonly known as: AUGMENTIN  Take 1 tablet by mouth every 12 (twelve) hours. for 13 doses     midodrine 5 MG Tab  Commonly known as: PROAMATINE  Take 1 tablet (5 mg total) by mouth 3 (three) times daily.     ondansetron 8 MG Tbdl  Commonly known as: ZOFRAN-ODT  Take 1 tablet (8 mg total) by mouth every 8 (eight) hours as needed (nausea).            CONTINUE taking these medications      acetaminophen 325 MG tablet  Commonly known as: TYLENOL  Take 325 mg by mouth every 4 (four) hours as needed (headache/ fever).     ADVAIR DISKUS 250-50 mcg/dose diskus inhaler  Generic drug: fluticasone-salmeterol 250-50 mcg/dose  Inhale 1 puff into the lungs 2 (two) times daily.     aluminum-magnesium hydroxide-simethicone 200-200-20 mg/5 mL Susp  Commonly known as: MAALOX  Take 30 mLs by mouth every 4 (four) hours as needed (dyspepsia).     atorvastatin 20 MG tablet  Commonly known as: LIPITOR  Take 20 mg by mouth every evening.     bisacodyL 10 mg Supp  Commonly known as: DULCOLAX  Place 10 mg rectally daily as needed (constipation).     brimonidine 0.2% 0.2 % Drop  Commonly known as: ALPHAGAN  Place 1 drop into both eyes 2 (two) times a day.     bumetanide 2 MG tablet  Commonly known as: BUMEX  Take 2 mg by mouth once daily.     DIPHENHYDRAMINE-PHENYLEPHRINE ORAL  Take 10 mLs by mouth 3 (three) times daily as needed (cough).     ergocalciferol 50,000 unit Cap  Commonly known as: ERGOCALCIFEROL  Take 50,000 Units by mouth every Thursday.      famotidine 20 MG tablet  Commonly known as: PEPCID  Take 20 mg by mouth once daily.     guaiFENesin 600 mg 12 hr tablet  Commonly known as: MUCINEX  Take 600 mg by mouth 2 (two) times daily.     levETIRAcetam 500 MG Tab  Commonly known as: KEPPRA  Take 500 mg by mouth 2 (two) times daily.     metoclopramide HCl 5 mg/5 mL Soln  Commonly known as: REGLAN  Take 5 mg by mouth every 8 (eight) hours as needed (ileus issues).     metoprolol tartrate 25 MG tablet  Commonly known as: LOPRESSOR  Take 25 mg by mouth 2 (two) times daily.     ONE DAILY MULTIVITAMIN per tablet  Generic drug: multivitamin  Take 1 tablet by mouth once daily.     potassium chloride SA 20 MEQ tablet  Commonly known as: K-DUR,KLOR-CON  Take 20 mEq by mouth 2 (two) times daily.     PROAIR HFA 90 mcg/actuation inhaler  Generic drug: albuterol  Inhale 2 puffs into the lungs every 4 (four) hours as needed for Wheezing. Rescue     senna 8.6 mg tablet  Commonly known as: SENOKOT  Take 2 tablets by mouth daily as needed for Constipation.     TRAVATAN Z 0.004 % ophthalmic solution  Generic drug: travoprost  Place 1 drop into both eyes every evening.     VITAMIN C 500 MG tablet  Generic drug: ascorbic acid (vitamin C)  Take 500 mg by mouth once daily.                Immunizations Administered as of 2/15/2023       No immunizations on file.            Overdue for second dose    Some patients may experience side effects after vaccination.  These may include fever, headache, muscle or joint aches.  Most symptoms resolve with 24-48 hours and do not require urgent medical evaluation unless they persist for more than 72 hours or symptoms are concerning for an unrelated medical condition.          _________________________________  Marixa Azar MD  02/15/2023

## 2023-02-14 NOTE — SUBJECTIVE & OBJECTIVE
Interval History: Pt/family open to hospice care.     Past Medical History:   Diagnosis Date    Alcohol abuse     Aphasia     COPD (chronic obstructive pulmonary disease)     GERD (gastroesophageal reflux disease)     Hypertension     Hypokalemia     Opioid abuse     Stroke     Unspecified glaucoma        History reviewed. No pertinent surgical history.    Review of patient's allergies indicates:  No Known Allergies    Medications:  Continuous Infusions:   NORepinephrine bitartrate-D5W       Scheduled Meds:   brimonidine 0.2%  1 drop Both Eyes BID    [START ON 2/16/2023] ergocalciferol  50,000 Units Oral Every Thurs    famotidine  20 mg Oral Daily    heparin (porcine)  5,000 Units Subcutaneous Q8H    levETIRAcetam  500 mg Oral BID    midodrine  5 mg Oral TID    piperacillin-tazobactam (ZOSYN) IVPB  4.5 g Intravenous Q8H     PRN Meds:albuterol, aluminum-magnesium hydroxide-simethicone, dextrose 10%, dextrose 10%, glucagon (human recombinant), glucose, glucose, insulin aspart U-100, naloxone, sodium chloride 0.9%    Family History    None       Tobacco Use    Smoking status: Never    Smokeless tobacco: Never   Substance and Sexual Activity    Alcohol use: Not on file    Drug use: Not Currently    Sexual activity: Not on file       Review of Systems   Constitutional:  Positive for activity change.   HENT:  Negative for trouble swallowing.    Respiratory:  Negative for shortness of breath.    Gastrointestinal:  Negative for nausea.   Musculoskeletal:  Positive for gait problem.   Neurological:  Positive for weakness.   Psychiatric/Behavioral:  Positive for decreased concentration.    Objective:     Vital Signs (Most Recent):  Temp: 97.7 °F (36.5 °C) (02/14/23 0701)  Pulse: 103 (02/14/23 0900)  Resp: 10 (02/14/23 0900)  BP: (!) 79/50 (02/14/23 0900)  SpO2: 95 % (02/14/23 0900)   Vital Signs (24h Range):  Temp:  [97.7 °F (36.5 °C)-98.2 °F (36.8 °C)] 97.7 °F (36.5 °C)  Pulse:  [100-123] 103  Resp:  [10-17] 10  SpO2:  [95  %-98 %] 95 %  BP: ()/(50-67) 79/50     Weight: 92.1 kg (203 lb)  Body mass index is 26.78 kg/m².    Physical Exam  HENT:      Head: Normocephalic and atraumatic.   Cardiovascular:      Rate and Rhythm: Normal rate and regular rhythm.      Comments: On levophed  Pulmonary:      Effort: Pulmonary effort is normal. No respiratory distress.   Musculoskeletal:      Cervical back: No edema.      Comments: Right sided hemiparesis   Skin:     General: Skin is warm and dry.   Neurological:      Mental Status: He is alert and oriented to person, place, and time.   Psychiatric:         Speech: Speech is delayed.         Behavior: Behavior is cooperative.       Review of Symptoms      Symptom Assessment (ESAS 0-10 Scale)  Pain:  0  Dyspnea:  0  Anxiety:  0  Nausea:  0  Depression:  0  Anorexia:  0  Fatigue:  0  Insomnia:  0  Restlessness:  0  Agitation:  0         Psychosocial/Cultural:   See Palliative Psychosocial Note: No  [facility]. Pt sisterDalia is in from [facility]. Pt has a daughter and a son. DaughterKelly lives locally along with brother.    **Primary  to Follow**  Palliative Care  Consult: No      Advance Care Planning   Advance Directives:   Living Will: No        Copy on chart: Yes    LaPOST: No    Do Not Resuscitate Status: Yes    Agent's Name:  Pt's adult children are NOK    Decision Making:  Patient answered questions and Family answered questions  Goals of Care: The patient and family endorses that what is most important right now is to focus on comfort and QOL     Accordingly, we have decided that the best plan to meet the patient's goals includes enrolling in hospice care       Significant Labs: All pertinent labs within the past 24 hours have been reviewed.  CBC:   Recent Labs   Lab 02/14/23 0213   WBC 14.94*   HGB 9.7*   HCT 30.7*   MCV 69*        BMP:  Recent Labs   Lab 02/14/23 0213 02/14/23  0800   * 111*   * 129*   K 3.1* 3.7   CL 91* 89*    CO2 26 27   BUN 7* 9   CREATININE 0.6 0.6   CALCIUM 8.5* 8.6*   MG 1.6  --      LFT:  Lab Results   Component Value Date     (H) 02/14/2023    ALKPHOS 1,135 (H) 02/14/2023    BILITOT 25.3 (H) 02/14/2023     Albumin:   Albumin   Date Value Ref Range Status   02/14/2023 1.7 (L) 3.5 - 5.2 g/dL Final     Protein:   Total Protein   Date Value Ref Range Status   02/14/2023 5.8 (L) 6.0 - 8.4 g/dL Final     Lactic acid:   Lab Results   Component Value Date    LACTATE 1.1 02/11/2023    LACTATE 1.1 02/11/2023       Significant Imaging: I have reviewed all pertinent imaging results/findings within the past 24 hours.

## 2023-02-14 NOTE — PROGRESS NOTES
Pharmacokinetic Sign-off: IV Vancomycin    Therapy with vancomycin complete and/or consult discontinued by provider.  Pharmacy will sign off, please re-consult as needed.    Milly Cortes, PharmD, BCPS  EXT [id number]

## 2023-02-14 NOTE — NURSING
CMICU DAILY GOALS       A: Awake    RASS: Goal -    Actual -     Restraint necessity:    B: Breathe   SBT: Not intubated   C: Coordinate A & B, analgesics/sedatives   Pain: managed    SAT: Not intubated  D: Delirium   CAM-ICU: Overall CAM-ICU: Negative  E: Early(intubated/ Progressive (non-intubated) Mobility   MOVE Screen: Pass   Activity: Activity Management: Rolling - L1  FAS: Feeding/Nutrition   Diet order: Diet/Nutrition Received: NPO,    T: Thrombus   DVT prophylaxis: VTE Required Core Measure: Pharmacological prophylaxis initiated/maintained  H: HOB Elevation   Head of Bed (HOB) Positioning: HOB at 20-30 degrees  U: Ulcer Prophylaxis   GI: yes  G: Glucose control   managed Glycemic Management: blood glucose monitored  S: Skin   Bathing/Skin Care: bath, complete, dressed/undressed, incontinence care, linen changed  Device Skin Pressure Protection: absorbent pad utilized/changed, adhesive use limited, positioning supports utilized, pressure points protected, skin-to-device areas padded, skin-to-skin areas padded  Pressure Reduction Devices: pressure-redistributing mattress utilized, specialty bed utilized, positioning supports utilized  Pressure Reduction Techniques: weight shift assistance provided, pressure points protected, positioned off wounds, heels elevated off bed  Skin Protection: adhesive use limited, drying agents applied, incontinence pads utilized, pectin skin barriers applied, skin-to-device areas padded, skin-to-skin areas padded, transparent dressing maintained, tubing/devices free from skin contact, silicone foam dressing in place  B: Bowel Function   no issues   I: Indwelling Catheters   Roca necessity:      Urethral Catheter 02/11/23 1624 Straight-tip-Reason for Continuing Urinary Catheterization: Critically ill in ICU and requiring hourly monitoring of intake/output   CVC necessity: Yes  D: De-escalation Antibiotics   No    Family/Goals of care/Code Status   Code Status: DNR    24H Vital  Sign Range  Temp:  [98 °F (36.7 °C)-98.3 °F (36.8 °C)]   Pulse:  []   Resp:  [13-18]   BP: ()/(50-67)   SpO2:  [94 %-99 %]      Shift Events   No acute events throughout shift. Patient went down for a ERCP today. Doctor took a biopsy of the pancreatic mass with prelim result as positive for adenocarcinoma; as well as they put a biliary stent.    VS and assessment per flow sheet, patient progressing towards goals as tolerated, plan of care reviewed with family, all concerns addressed, will continue to monitor.    Paz Powell

## 2023-02-14 NOTE — PROGRESS NOTES
[facility] - Cardiac Medical [facility]  Palliative Medicine  Progress Note    Patient Name: Anthony Borges  MRN: [id number]  Admission Date: 2/11/2023  Hospital Length of Stay: 3 days  Code Status: DNR   Attending Provider: Mando Elliott MD  Consulting Provider: GOYO Garner  Primary Care Physician: Bean Pearce MD  Principal Problem:Pancreatic mass    Patient information was obtained from patient, relative(s) and primary team.      Assessment/Plan:     Palliative care encounter  Impression: Pt is a 61 y/o male with  history of CVA and right hemparesis. Patient admitted from his nursing home with jaundice, AMS, and tachycardia. On arrival, Tbili 29.3 with associated transamintis. 2.3 cm mass noted in head of pancreas with associated biliary dilation and suspcious intrabdominal LAD. Pt to have ERCP today. Pt is alert, oriented to person, place, and aware he has a pancreatic mass. Pt is a DNR.    Reason for consult: ACP/hospice. Discussed case with Dr. Hunter  Goals of care/ACP    Today: Met with pt's sister, Dalia, who is in from [place]. Pt's brother and brother-in-law at bedside. Pt and family aware of pancreatic cancer. Pt and family want pt to [facility] back to [facility] with hospice. Per pt, if he could not make medical decisions, he would want his sister Dalia to make his decisions. Hospice care discussed at NH. Did let pt and family know pt it on Levophed for his low BP. Let family know he would have to be weaned off levo to go back to nursing home.     2/13/23  No family at bedside. Per pt he lives at a NH and mostly in bed dute to effects of past stroke. Pt is aware of pancreatic mass. Per pt, he has a daughter and a sister who lives in [place]. CC team has spoken to pt's sister in [place] for ACP planning.     Called and spoke to pt's sister in [place]. She will be coming to visit  Tomorrow around 900. Per pt's sister, pt's daughter is also aware of medical situation and will be visiting.   Pt's sister  reports she is MPOA. No paperwork in chart. Pt's sister to bring. Pt's NOK is his adult kid(s) if no MPOA paperwork completed.     Code status: DNR    Called and updated Dr. Hunter on above info.     Symptom management:    Debility:  Pt bed-bound.   Pt has  Right sided weakness.    Repositioning per bedside nurse    Nausea:  Pt reports intermittent nausea    Meds:    Would have zofran 4 mg q 8 hrs prn.       Plan:  Pt and family at bedside are open to hospice at NH.   Barrier to d/c at this time is pt still requiring levophed.                     I will follow-up with patient. Please contact us if you have any additional questions.    Subjective:     Chief Complaint:   Chief Complaint   Patient presents with    Abnormal Chest X-ray    Jaundice       HPI:   Pt is a 62 y.o. male with a PMHx of HTN, COPD, and CVA (residual RUE contracture and RLE weakness) who presented from a nursing home after the staff noticed he was jaundiced a day ago. Nursing staff also reportsed confusion and fatigue. HPI limited as patient is confused but he reports abdominal pain for the last 5 days and dysuria for the last 4 days. Denies any fevers, chills, chest pain, SOB, cough, nausea, diarrhea, or vomiting. He is prescribed an albuterol inhaler for COPD, denies sleeping with a bipap at night.      Per chart review:   In the ED patient was afebrile, tachycardic to 110, BP 90/64, and was on RA. Labs significant for a WBC 16.1, Na 124, K 2.8, Alk phos 1600, Tbili 29, , , Ammonia 67. BNP, trop, and lacate x2 were normal. Initial ABG with a pH of 7.15 pCO2 of 76, repeat with a pH 7.54 and pCO2 of 33 without the use of bipap. Patient was given a dose of vanz/zosyn and was given 2.4L NS. CT head with no acute findings. CT abdomen with a large mass in the pancreatic head resulting in severe pancreatic ductal dilation and severe intra and extrahepatic biliary ductal dilation. Gen surg consulted and stated patient not a surgical  candidate at this time. MICU consulted for sepsis and hyponatremia.    Plan is back to NH with possible hospice.         Hospital Course:  No notes on file    Interval History: Pt/family open to hospice care.     Past Medical History:   Diagnosis Date    Alcohol abuse     Aphasia     COPD (chronic obstructive pulmonary disease)     GERD (gastroesophageal reflux disease)     Hypertension     Hypokalemia     Opioid abuse     Stroke     Unspecified glaucoma        History reviewed. No pertinent surgical history.    Review of patient's allergies indicates:  No Known Allergies    Medications:  Continuous Infusions:   NORepinephrine bitartrate-D5W       Scheduled Meds:   brimonidine 0.2%  1 drop Both Eyes BID    [START ON 2/16/2023] ergocalciferol  50,000 Units Oral Every Thurs    famotidine  20 mg Oral Daily    heparin (porcine)  5,000 Units Subcutaneous Q8H    levETIRAcetam  500 mg Oral BID    midodrine  5 mg Oral TID    piperacillin-tazobactam (ZOSYN) IVPB  4.5 g Intravenous Q8H     PRN Meds:albuterol, aluminum-magnesium hydroxide-simethicone, dextrose 10%, dextrose 10%, glucagon (human recombinant), glucose, glucose, insulin aspart U-100, naloxone, sodium chloride 0.9%    Family History    None       Tobacco Use    Smoking status: Never    Smokeless tobacco: Never   Substance and Sexual Activity    Alcohol use: Not on file    Drug use: Not Currently    Sexual activity: Not on file       Review of Systems   Constitutional:  Positive for activity change.   HENT:  Negative for trouble swallowing.    Respiratory:  Negative for shortness of breath.    Gastrointestinal:  Negative for nausea.   Musculoskeletal:  Positive for gait problem.   Neurological:  Positive for weakness.   Psychiatric/Behavioral:  Positive for decreased concentration.    Objective:     Vital Signs (Most Recent):  Temp: 97.7 °F (36.5 °C) (02/14/23 0701)  Pulse: 103 (02/14/23 0900)  Resp: 10 (02/14/23 0900)  BP: (!) 79/50 (02/14/23  0900)  SpO2: 95 % (02/14/23 0900)   Vital Signs (24h Range):  Temp:  [97.7 °F (36.5 °C)-98.2 °F (36.8 °C)] 97.7 °F (36.5 °C)  Pulse:  [100-123] 103  Resp:  [10-17] 10  SpO2:  [95 %-98 %] 95 %  BP: ()/(50-67) 79/50     Weight: 92.1 kg (203 lb)  Body mass index is 26.78 kg/m².    Physical Exam  HENT:      Head: Normocephalic and atraumatic.   Cardiovascular:      Rate and Rhythm: Normal rate and regular rhythm.      Comments: On levophed  Pulmonary:      Effort: Pulmonary effort is normal. No respiratory distress.   Musculoskeletal:      Cervical back: No edema.      Comments: Right sided hemiparesis   Skin:     General: Skin is warm and dry.   Neurological:      Mental Status: He is alert and oriented to person, place, and time.   Psychiatric:         Speech: Speech is delayed.         Behavior: Behavior is cooperative.       Review of Symptoms      Symptom Assessment (ESAS 0-10 Scale)  Pain:  0  Dyspnea:  0  Anxiety:  0  Nausea:  0  Depression:  0  Anorexia:  0  Fatigue:  0  Insomnia:  0  Restlessness:  0  Agitation:  0         Psychosocial/Cultural:   See Palliative Psychosocial Note: No  Pt [facility] at [facility]. Pt sisterDalia is in from [facility]. Pt has a daughter and a son. DaughterKelly lives locally along with brother.    **Primary  to Follow**  Palliative Care  Consult: No      Advance Care Planning   Advance Directives:   Living Will: No        Copy on chart: Yes    LaPOST: No    Do Not Resuscitate Status: Yes    Agent's Name:  Pt's adult children are NOK    Decision Making:  Patient answered questions and Family answered questions  Goals of Care: The patient and family endorses that what is most important right now is to focus on comfort and QOL     Accordingly, we have decided that the best plan to meet the patient's goals includes enrolling in hospice care       Significant Labs: All pertinent labs within the past 24 hours have been reviewed.  CBC:   Recent Labs   Lab  02/14/23 0213   WBC 14.94*   HGB 9.7*   HCT 30.7*   MCV 69*        BMP:  Recent Labs   Lab 02/14/23  0213 02/14/23  0800   * 111*   * 129*   K 3.1* 3.7   CL 91* 89*   CO2 26 27   BUN 7* 9   CREATININE 0.6 0.6   CALCIUM 8.5* 8.6*   MG 1.6  --      LFT:  Lab Results   Component Value Date     (H) 02/14/2023    ALKPHOS 1,135 (H) 02/14/2023    BILITOT 25.3 (H) 02/14/2023     Albumin:   Albumin   Date Value Ref Range Status   02/14/2023 1.7 (L) 3.5 - 5.2 g/dL Final     Protein:   Total Protein   Date Value Ref Range Status   02/14/2023 5.8 (L) 6.0 - 8.4 g/dL Final     Lactic acid:   Lab Results   Component Value Date    LACTATE 1.1 02/11/2023    LACTATE 1.1 02/11/2023       Significant Imaging: I have reviewed all pertinent imaging results/findings within the past 24 hours.      25 minutes of ACP completed.     Molly Zapata, CNS  Palliative Medicine  [facility] - Cardiac Medical ICU

## 2023-02-14 NOTE — PLAN OF CARE
Met with family at bedside with Dr. Elliott and Dr. Hunter as well as patient's sister other family members. Discussed patient's diagnosis and goals of care. Patient's sister and family expressed understanding of his diagnosis and would like hospice care. Social work at bedside and able to discuss placement options for the patient with family.    Marixa Azar MD  10:39 AM

## 2023-02-14 NOTE — TREATMENT PLAN
AES Follow-up Note     Anthony Borges was seen and examined.   1 Day Post-Op s/p ERCP/EUS Biopse  No abdominal discomfort. Tolerating diet.    Vitals stable. Afebrile.     Lab Results   Component Value Date     (H) 02/14/2023     (H) 02/14/2023    ALKPHOS 1,135 (H) 02/14/2023    BILITOT 25.3 (H) 02/14/2023    BILITOT 27.4 (H) 02/13/2023    BILITOT 27.3 (H) 02/12/2023       No sx/sx of post-ERCP pancreatitis or other procedural complications.   AES will sign off. Please call if any questions/concerns.  Patient will be contacted with follow-up information.     Jadiel Reddy MD  Gastroenterology & Hepatology Fellow

## 2023-02-15 VITALS
BODY MASS INDEX: 26.9 KG/M2 | RESPIRATION RATE: 13 BRPM | DIASTOLIC BLOOD PRESSURE: 56 MMHG | TEMPERATURE: 98 F | WEIGHT: 203 LBS | HEART RATE: 97 BPM | HEIGHT: 73 IN | SYSTOLIC BLOOD PRESSURE: 89 MMHG | OXYGEN SATURATION: 99 %

## 2023-02-15 LAB
ADEQUACY: ABNORMAL
ALBUMIN SERPL BCP-MCNC: 1.7 G/DL (ref 3.5–5.2)
ALP SERPL-CCNC: 1051 U/L (ref 55–135)
ALT SERPL W/O P-5'-P-CCNC: 245 U/L (ref 10–44)
ANION GAP SERPL CALC-SCNC: 8 MMOL/L (ref 8–16)
AST SERPL-CCNC: 241 U/L (ref 10–40)
BASOPHILS # BLD AUTO: 0.08 K/UL (ref 0–0.2)
BASOPHILS NFR BLD: 0.6 % (ref 0–1.9)
BILIRUB SERPL-MCNC: 25.8 MG/DL (ref 0.1–1)
BUN SERPL-MCNC: 9 MG/DL (ref 8–23)
CALCIUM SERPL-MCNC: 8.5 MG/DL (ref 8.7–10.5)
CHLORIDE SERPL-SCNC: 92 MMOL/L (ref 95–110)
CO2 SERPL-SCNC: 29 MMOL/L (ref 23–29)
CREAT SERPL-MCNC: 0.7 MG/DL (ref 0.5–1.4)
DIFFERENTIAL METHOD: ABNORMAL
EOSINOPHIL # BLD AUTO: 0.2 K/UL (ref 0–0.5)
EOSINOPHIL NFR BLD: 1.6 % (ref 0–8)
ERYTHROCYTE [DISTWIDTH] IN BLOOD BY AUTOMATED COUNT: 21.4 % (ref 11.5–14.5)
EST. GFR  (NO RACE VARIABLE): >60 ML/MIN/1.73 M^2
FINAL PATHOLOGIC DIAGNOSIS: ABNORMAL
GLUCOSE SERPL-MCNC: 102 MG/DL (ref 70–110)
HCT VFR BLD AUTO: 28.8 % (ref 40–54)
HGB BLD-MCNC: 8.9 G/DL (ref 14–18)
IMM GRANULOCYTES # BLD AUTO: 0.15 K/UL (ref 0–0.04)
IMM GRANULOCYTES NFR BLD AUTO: 1 % (ref 0–0.5)
LYMPHOCYTES # BLD AUTO: 2.2 K/UL (ref 1–4.8)
LYMPHOCYTES NFR BLD: 15.1 % (ref 18–48)
Lab: ABNORMAL
MAGNESIUM SERPL-MCNC: 2.1 MG/DL (ref 1.6–2.6)
MCH RBC QN AUTO: 21.7 PG (ref 27–31)
MCHC RBC AUTO-ENTMCNC: 30.9 G/DL (ref 32–36)
MCV RBC AUTO: 70 FL (ref 82–98)
MONOCYTES # BLD AUTO: 1.6 K/UL (ref 0.3–1)
MONOCYTES NFR BLD: 11.1 % (ref 4–15)
NEUTROPHILS # BLD AUTO: 10.2 K/UL (ref 1.8–7.7)
NEUTROPHILS NFR BLD: 70.6 % (ref 38–73)
NRBC BLD-RTO: 0 /100 WBC
PHOSPHATE SERPL-MCNC: 2.2 MG/DL (ref 2.7–4.5)
PLATELET # BLD AUTO: 397 K/UL (ref 150–450)
PMV BLD AUTO: 11.1 FL (ref 9.2–12.9)
POCT GLUCOSE: 141 MG/DL (ref 70–110)
POCT GLUCOSE: 174 MG/DL (ref 70–110)
POTASSIUM SERPL-SCNC: 3.4 MMOL/L (ref 3.5–5.1)
PROT SERPL-MCNC: 5.8 G/DL (ref 6–8.4)
RBC # BLD AUTO: 4.11 M/UL (ref 4.6–6.2)
SODIUM SERPL-SCNC: 129 MMOL/L (ref 136–145)
WBC # BLD AUTO: 14.47 K/UL (ref 3.9–12.7)

## 2023-02-15 PROCEDURE — 85025 COMPLETE CBC W/AUTO DIFF WBC: CPT | Performed by: EMERGENCY MEDICINE

## 2023-02-15 PROCEDURE — 99238 PR HOSPITAL DISCHARGE DAY,<30 MIN: ICD-10-PCS | Mod: ,,, | Performed by: EMERGENCY MEDICINE

## 2023-02-15 PROCEDURE — 63600175 PHARM REV CODE 636 W HCPCS: Performed by: STUDENT IN AN ORGANIZED HEALTH CARE EDUCATION/TRAINING PROGRAM

## 2023-02-15 PROCEDURE — 25000003 PHARM REV CODE 250: Performed by: EMERGENCY MEDICINE

## 2023-02-15 PROCEDURE — 25000003 PHARM REV CODE 250: Performed by: STUDENT IN AN ORGANIZED HEALTH CARE EDUCATION/TRAINING PROGRAM

## 2023-02-15 PROCEDURE — 25000003 PHARM REV CODE 250

## 2023-02-15 PROCEDURE — 63600175 PHARM REV CODE 636 W HCPCS

## 2023-02-15 PROCEDURE — 84100 ASSAY OF PHOSPHORUS: CPT | Performed by: STUDENT IN AN ORGANIZED HEALTH CARE EDUCATION/TRAINING PROGRAM

## 2023-02-15 PROCEDURE — 99238 HOSP IP/OBS DSCHRG MGMT 30/<: CPT | Mod: ,,, | Performed by: EMERGENCY MEDICINE

## 2023-02-15 PROCEDURE — 83735 ASSAY OF MAGNESIUM: CPT | Performed by: STUDENT IN AN ORGANIZED HEALTH CARE EDUCATION/TRAINING PROGRAM

## 2023-02-15 PROCEDURE — 80053 COMPREHEN METABOLIC PANEL: CPT | Performed by: STUDENT IN AN ORGANIZED HEALTH CARE EDUCATION/TRAINING PROGRAM

## 2023-02-15 PROCEDURE — 94761 N-INVAS EAR/PLS OXIMETRY MLT: CPT

## 2023-02-15 RX ORDER — AMOXICILLIN AND CLAVULANATE POTASSIUM 875; 125 MG/1; MG/1
1 TABLET, FILM COATED ORAL EVERY 12 HOURS
Status: DISCONTINUED | OUTPATIENT
Start: 2023-02-15 | End: 2023-02-15 | Stop reason: HOSPADM

## 2023-02-15 RX ORDER — AMOXICILLIN AND CLAVULANATE POTASSIUM 875; 125 MG/1; MG/1
1 TABLET, FILM COATED ORAL EVERY 12 HOURS
Start: 2023-02-15 | End: 2023-02-22

## 2023-02-15 RX ORDER — ONDANSETRON 8 MG/1
8 TABLET, ORALLY DISINTEGRATING ORAL EVERY 8 HOURS PRN
Status: DISCONTINUED | OUTPATIENT
Start: 2023-02-15 | End: 2023-02-15 | Stop reason: HOSPADM

## 2023-02-15 RX ORDER — ONDANSETRON 8 MG/1
8 TABLET, ORALLY DISINTEGRATING ORAL EVERY 8 HOURS PRN
Start: 2023-02-15

## 2023-02-15 RX ORDER — MIDODRINE HYDROCHLORIDE 5 MG/1
5 TABLET ORAL 3 TIMES DAILY
Qty: 90 TABLET | Refills: 11 | Status: SHIPPED | OUTPATIENT
Start: 2023-02-15 | End: 2024-02-15

## 2023-02-15 RX ADMIN — ONDANSETRON 8 MG: 8 TABLET, ORALLY DISINTEGRATING ORAL at 08:02

## 2023-02-15 RX ADMIN — PIPERACILLIN SODIUM AND TAZOBACTAM SODIUM 4.5 G: 4; .5 INJECTION, POWDER, FOR SOLUTION INTRAVENOUS at 02:02

## 2023-02-15 RX ADMIN — POTASSIUM PHOSPHATE, MONOBASIC AND POTASSIUM PHOSPHATE, DIBASIC 30 MMOL: 224; 236 INJECTION, SOLUTION, CONCENTRATE INTRAVENOUS at 05:02

## 2023-02-15 RX ADMIN — MIDODRINE HYDROCHLORIDE 5 MG: 5 TABLET ORAL at 08:02

## 2023-02-15 RX ADMIN — BRIMONIDINE TARTRATE 1 DROP: 2 SOLUTION OPHTHALMIC at 08:02

## 2023-02-15 RX ADMIN — LEVETIRACETAM 500 MG: 500 TABLET, FILM COATED ORAL at 08:02

## 2023-02-15 RX ADMIN — FAMOTIDINE 20 MG: 20 TABLET, FILM COATED ORAL at 08:02

## 2023-02-15 RX ADMIN — PIPERACILLIN SODIUM AND TAZOBACTAM SODIUM 4.5 G: 4; .5 INJECTION, POWDER, FOR SOLUTION INTRAVENOUS at 10:02

## 2023-02-15 RX ADMIN — HEPARIN SODIUM 5000 UNITS: 5000 INJECTION INTRAVENOUS; SUBCUTANEOUS at 05:02

## 2023-02-15 NOTE — ASSESSMENT & PLAN NOTE
Meets sepsis criteria on presentation with leukocytosis and tachycardia  Pt reports abdominal pain and dysuria for the past 4-5 days  Some concern for possible cholecystitis on CT abdo  Source could be intra-abdominal vs urinary although UA not concerning for infection  CXR showing left sided atelectasis and pleural effusion which could be obscuring underlying infection    Plan:  - stopped zosyn for antibiotic coverage, will discharge home on 6 more days Augmentin   - levo off, started on midodrine 5 mg TID with MAPs around 67  - blood culture : NGTD   - urine culture: NGTD  - given 2.5L IV fluids and initially responded, however subsequently became hypotensive; peripheral vasopressors with MAP goal > 65, wean as tolerated

## 2023-02-15 NOTE — PROGRESS NOTES
[facility] - Card[facility] Medical [facility]  Critical Care Medicine  Progress Note    Patient Name: Anthony Borges  MRN: [id number]  Admission Date: 2/11/2023  Hospital Length of Stay: 4 days  Code Status: DNR  Attending Provider: Mando Elliott MD  Primary Care Provider: Bean Pearce MD   Principal Problem: Pancreatic mass    Subjective:     HPI:  Mr. Anthony Borges is a 62 y.o. male with a PMHx of HTN, COPD, and CVA (residual RUE contracture and RLE weakness) who presented from a nursing home after the staff noticed he was jaundiced a day ago. Nursing staff also reports confusion and fatigue. HPI limited as patient is confused but he reports abdominal pain for the last 5 days and dysuria for the last 4 days. Denies any fevers, chills, chest pain, SOB, cough, nausea, diarrhea, or vomiting. He is prescribed an albuterol inhaler for COPD, denies sleeping with a bipap at night.     In the ED patient was afebrile, tachycardic to 110, BP 90/64, and was on RA. Labs significant for a WBC 16.1, Na 124, K 2.8, Alk phos 1600, Tbili 29, , , Ammonia 67. BNP, trop, and lacate x2 were normal. Initial ABG with a pH of 7.15 pCO2 of 76, repeat with a pH 7.54 and pCO2 of 33 without the use of bipap. Patient was given a dose of vanz/zosyn and was given 2.4L NS. CT head with no acute findings. CT abdomen with a large mass in the pancreatic head resulting in severe pancreatic ductal dilation and severe intra and extrahepatic biliary ductal dilation. Gen surg consulted and stated patient not a surgical candidate at this time. MICU consulted for sepsis and hyponatremia.      Hospital/ICU Course:  2/14 Family at bedside. Extensive discussion about patient's current diagnosis and goals of care. Patient's family would like to pursue hospice. SW appreciated. [facility] [place]  [facility] Will prescribed zofran prn for nausea, continue midodrine for soft Bps, and finish abx course with 6 more days of  augmentin.       Interval History/Significant Events: NAOE    Review of Systems   Constitutional:  Negative for fever.   Cardiovascular:  Negative for chest pain.   Gastrointestinal:  Negative for abdominal distention, abdominal pain, nausea and vomiting.   Objective:     Vital Signs (Most Recent):  Temp: 97.8 °F (36.6 °C) (02/15/23 0701)  Pulse: 97 (02/15/23 1100)  Resp: 15 (02/15/23 1100)  BP: (!) 89/54 (02/15/23 1100)  SpO2: 98 % (02/15/23 1100)   Vital Signs (24h Range):  Temp:  [97.8 °F (36.6 °C)-98.1 °F (36.7 °C)] 97.8 °F (36.6 °C)  Pulse:  [] 97  Resp:  [11-15] 15  SpO2:  [97 %-99 %] 98 %  BP: ()/(53-69) 89/54   Weight: 92.1 kg (203 lb)  Body mass index is 26.78 kg/m².      Intake/Output Summary (Last 24 hours) at 2/15/2023 1234  Last data filed at 2/15/2023 0822  Gross per 24 hour   Intake 585.36 ml   Output 995 ml   Net -409.64 ml       Physical Exam  Vitals and nursing note reviewed.   Constitutional:       Appearance: He is cachectic. He is ill-appearing.   HENT:      Mouth/Throat:      Mouth: Mucous membranes are moist.   Eyes:      General: Scleral icterus present.      Extraocular Movements: Extraocular movements intact.      Pupils: Pupils are equal, round, and reactive to light.   Cardiovascular:      Rate and Rhythm: Normal rate.      Pulses: Normal pulses.   Pulmonary:      Effort: Pulmonary effort is normal. No respiratory distress.   Abdominal:      General: There is distension.      Palpations: Abdomen is soft.      Tenderness: There is no abdominal tenderness. There is no guarding or rebound.   Musculoskeletal:      Right lower leg: Edema present.      Left lower leg: Edema present.      Comments: Chronic R sided contractures    Skin:     General: Skin is dry.      Capillary Refill: Capillary refill takes less than 2 seconds.      Coloration: Skin is jaundiced.   Neurological:      Mental Status: He is alert and oriented to person, place, and time. He is confused.      GCS: GCS eye  subscore is 4. GCS verbal subscore is 4. GCS motor subscore is 6.      Cranial Nerves: No facial asymmetry.      Comments: Improved insight. Still mildly confused but asking questions and answering more appropriately    Psychiatric:         Mood and Affect: Mood normal.       Vents:     Lines/Drains/Airways       Drain  Duration                  Urethral Catheter 02/11/23 1624 Straight-tip 3 days              Peripheral Intravenous Line  Duration                  Peripheral IV - Single Lumen 02/12/23 1255 20 G Distal;Left;Posterior Forearm 2 days         Peripheral IV - Single Lumen 02/13/23 1215 20 G;1 3/4 in Left;Anterior Forearm 2 days                  Significant Labs:    CBC/Anemia Profile:  Recent Labs   Lab 02/14/23  0213 02/15/23  0229   WBC 14.94* 14.47*   HGB 9.7* 8.9*   HCT 30.7* 28.8*    397   MCV 69* 70*   RDW 21.8* 21.4*        Chemistries:  Recent Labs   Lab 02/14/23 0213 02/14/23  0800 02/15/23  0229   * 129* 129*   K 3.1* 3.7 3.4*   CL 91* 89* 92*   CO2 26 27 29   BUN 7* 9 9   CREATININE 0.6 0.6 0.7   CALCIUM 8.5* 8.6* 8.5*   ALBUMIN 1.7*  --  1.7*   PROT 5.8*  --  5.8*   BILITOT 25.3*  --  25.8*   ALKPHOS 1,135*  --  1,051*   *  --  245*   *  --  241*   MG 1.6  --  2.1   PHOS 2.7  --  2.2*           Significant Imaging:  I have reviewed all pertinent imaging results/findings within the past 24 hours.      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  Bedbound at baseline and lives in NH     Plan:  - continue home keppra  - seizure precautions   - turn Pt every 2 hours  - pressure ulcer precautions per nursing    Pulmonary  Chronic obstructive lung disease  On albuterol PRN outpatient   No signs of wheezing or COPD exacerbation   Initial ABG with a pH of 7.15 and CO2 76, repeat pH 7.54 and pCO2 33 without bipap  Saturating > 92% on RA    Plan:  - PRN albuterol     Cardiac/Vascular  Hyperlipidemia  Hold home statin in setting of transaminitis     Renal/  Hyponatremia  Hyponatremic on presentation with Na 124  Baseline ~ 140  Also has low chloride  Pt oriented to person, place, and month but does appear somewhat confused; unclear if baseline given h/o CVA or 2/2 likely locally metastatic disease, infection, and elevated ammonia, etc vs hyponatremia   Hyponatremia possibly 2/2 malnutrition, poor PO intake in setting of abdominal pain  Could also be pseudohyponatremia in setting of jaundice   Na improved to 127 on blood gas after administration of IV fluids    Plan:  - NA now 129  - monitor Na Q6h  - hold off on further methods to correct will monitor as the patient is able to eat     ID  Sepsis  Meets sepsis criteria on presentation with leukocytosis and tachycardia  Pt reports abdominal pain and dysuria for the past 4-5 days  Some concern for possible cholecystitis on CT abdo  Source could be intra-abdominal vs urinary although UA not concerning for infection  CXR showing left sided atelectasis and pleural effusion which could be obscuring underlying infection    Plan:  - stopped zosyn for antibiotic coverage, will discharge home on 6 more days Augmentin   - levo off, started on midodrine 5 mg TID with MAPs around 67  - blood culture : NGTD   - urine culture: NGTD  - given 2.5L IV fluids and initially responded, however subsequently became hypotensive; peripheral vasopressors  with MAP goal > 65, wean as tolerated      Oncology  Elevated CA 19-9 level  See pancreatic mass    Endocrine  Hyperammonemia  Ammonia elevated 67 on admission  Could have element of liver disease and hepatic encephalopathy contributing to altered mentation     Plan:  - lactulose 20g TID  - adjust dose to achieve 2 to 3 soft stools/day       GI  * Pancreatic mass  Presented from nursing home due to jaundice and abdominal pain  On admission Tbili 29, Alk phos 1600, , Alt 393  CT abdomen/pelvis from 2/11 showing large mass in the pancreatic head, severe dilation of pancreatic duct and intra/extra hepatic biliary ducts as well as prominent lymph nodes concerning for metastatic disease  CA-A 19-9 ordered by ED and found to be elevated 30252  Seen by gen surg who advised Pt is not a surgical candidate and is unlikely to tolerate a Whipple procedure  ERCP on 02/13- Pancreatic Mass biopsy with prelim positive for adenocarcinoma.  Biliary stent placed.      Plan:  - Family would like to pursue inpatient hospice, will do [facility] hospice  - No current signs of post ERCP pancreatitis, lipase ordered and pending, WBC down trending, no abd TTP  - tolerated beside swallow okay to advance diet  - heme/onc consulted:    Picture is consistent with new diagnosis of pancreatic adenocarcinoma.    Regardless of metastatic state he is not currently fit for either palliative or neoadjuvant chemotherapy  - analgesia prn  - hold home atorvastatin 2/2 transaminitis  - continue to trend LFTs      Transaminitis  See pancreatitic mass    Gastroesophageal reflux disease  Continue home famotidine      Critical Care Daily Checklist:    A: Awake: RASS Goal/Actual Goal:    Actual: Morris Agitation Sedation Scale (RASS): Alert and calm   B: Spontaneous Breathing Trial Performed?     C: SAT & SBT Coordinated?  N/A                    D: Delirium: CAM-ICU Overall CAM-ICU: Negative   E: Early Mobility Performed? No   F: Feeding Goal:     Status:     Current Diet Order   Procedures    Diet Adult Regular (IDDSI Level 7) Thin     Order Specific Question:   Fluid consistency:     Answer:   Thin      AS: Analgesia/Sedation N/A   T: Thromboembolic Prophylaxis heparin   H: HOB > 300 Yes   U: Stress Ulcer Prophylaxis (if needed) N/A   G: Glucose Control At goal   B: Bowel Function Stool Occurrence: 1   I: Indwelling Catheter (Lines & Burns) Necessity PIVx2, burns   D: De-escalation of Antimicrobials/Pharmacotherapies Zosyn changed to augmentin    Plan for the day/ETD Dc to hospice    Code Status:  Family/Goals of Care: DNR  Updated at bedside       Critical secondary to Patient has a condition that poses threat to life and bodily function: sepsis     Critical care was time spent personally by me on the following activities: development of treatment plan with patient or surrogate and bedside caregivers, discussions with consultants, evaluation of patient's response to treatment, examination of patient, ordering and performing treatments and interventions, ordering and review of laboratory studies, ordering and review of radiographic studies, pulse oximetry, re-evaluation of patient's condition. This critical care time did not overlap with that of any other provider or involve time for any procedures.     Mraixa Azar MD  Critical Care Medicine  [facility] - Cardiac Medical ICU

## 2023-02-15 NOTE — ASSESSMENT & PLAN NOTE
Presented from nursing home due to jaundice and abdominal pain  On admission Tbili 29, Alk phos 1600, , Alt 393  CT abdomen/pelvis from 2/11 showing large mass in the pancreatic head, severe dilation of pancreatic duct and intra/extra hepatic biliary ducts as well as prominent lymph nodes concerning for metastatic disease  CA-A 19-9 ordered by ED and found to be elevated 22027  Seen by gen surg who advised Pt is not a surgical candidate and is unlikely to tolerate a Whipple procedure  ERCP on 02/13- Pancreatic Mass biopsy with prelim positive for adenocarcinoma.  Biliary stent placed.      Plan:  - Family would like to pursue inpatient hospice, will do [facility] hospice  - No current signs of post ERCP pancreatitis, lipase ordered and pending, WBC down trending, no abd TTP  - tolerated beside swallow okay to advance diet  - heme/onc consulted:    Picture is consistent with new diagnosis of pancreatic adenocarcinoma.    Regardless of metastatic state he is not currently fit for either palliative or neoadjuvant chemotherapy  - analgesia prn  - hold home atorvastatin 2/2 transaminitis  - continue to trend LFTs

## 2023-02-15 NOTE — PLAN OF CARE
[facility] set up ambulance transport for 3 pm. Bedside RN [place] to call report to [facility] at [phone].      Elise Arndt RN     [phone]

## 2023-02-15 NOTE — SUBJECTIVE & OBJECTIVE
Interval History/Significant Events: NAOE    Review of Systems   Constitutional:  Negative for fever.   Cardiovascular:  Negative for chest pain.   Gastrointestinal:  Negative for abdominal distention, abdominal pain, nausea and vomiting.   Objective:     Vital Signs (Most Recent):  Temp: 97.8 °F (36.6 °C) (02/15/23 0701)  Pulse: 97 (02/15/23 1100)  Resp: 15 (02/15/23 1100)  BP: (!) 89/54 (02/15/23 1100)  SpO2: 98 % (02/15/23 1100)   Vital Signs (24h Range):  Temp:  [97.8 °F (36.6 °C)-98.1 °F (36.7 °C)] 97.8 °F (36.6 °C)  Pulse:  [] 97  Resp:  [11-15] 15  SpO2:  [97 %-99 %] 98 %  BP: ()/(53-69) 89/54   Weight: 92.1 kg (203 lb)  Body mass index is 26.78 kg/m².      Intake/Output Summary (Last 24 hours) at 2/15/2023 1234  Last data filed at 2/15/2023 0822  Gross per 24 hour   Intake 585.36 ml   Output 995 ml   Net -409.64 ml       Physical Exam  Vitals and nursing note reviewed.   Constitutional:       Appearance: He is cachectic. He is ill-appearing.   HENT:      Mouth/Throat:      Mouth: Mucous membranes are moist.   Eyes:      General: Scleral icterus present.      Extraocular Movements: Extraocular movements intact.      Pupils: Pupils are equal, round, and reactive to light.   Cardiovascular:      Rate and Rhythm: Normal rate.      Pulses: Normal pulses.   Pulmonary:      Effort: Pulmonary effort is normal. No respiratory distress.   Abdominal:      General: There is distension.      Palpations: Abdomen is soft.      Tenderness: There is no abdominal tenderness. There is no guarding or rebound.   Musculoskeletal:      Right lower leg: Edema present.      Left lower leg: Edema present.      Comments: Chronic R sided contractures    Skin:     General: Skin is dry.      Capillary Refill: Capillary refill takes less than 2 seconds.      Coloration: Skin is jaundiced.   Neurological:      Mental Status: He is alert and oriented to person, place, and time. He is confused.      GCS: GCS eye subscore is 4.  GCS verbal subscore is 4. GCS motor subscore is 6.      Cranial Nerves: No facial asymmetry.      Comments: Improved insight. Still mildly confused but asking questions and answering more appropriately    Psychiatric:         Mood and Affect: Mood normal.       Vents:     Lines/Drains/Airways       Drain  Duration                  Urethral Catheter 02/11/23 1624 Straight-tip 3 days              Peripheral Intravenous Line  Duration                  Peripheral IV - Single Lumen 02/12/23 1255 20 G Distal;Left;Posterior Forearm 2 days         Peripheral IV - Single Lumen 02/13/23 1215 20 G;1 3/4 in Left;Anterior Forearm 2 days                  Significant Labs:    CBC/Anemia Profile:  Recent Labs   Lab 02/14/23  0213 02/15/23  0229   WBC 14.94* 14.47*   HGB 9.7* 8.9*   HCT 30.7* 28.8*    397   MCV 69* 70*   RDW 21.8* 21.4*        Chemistries:  Recent Labs   Lab 02/14/23 0213 02/14/23  0800 02/15/23  0229   * 129* 129*   K 3.1* 3.7 3.4*   CL 91* 89* 92*   CO2 26 27 29   BUN 7* 9 9   CREATININE 0.6 0.6 0.7   CALCIUM 8.5* 8.6* 8.5*   ALBUMIN 1.7*  --  1.7*   PROT 5.8*  --  5.8*   BILITOT 25.3*  --  25.8*   ALKPHOS 1,135*  --  1,051*   *  --  245*   *  --  241*   MG 1.6  --  2.1   PHOS 2.7  --  2.2*           Significant Imaging:  I have reviewed all pertinent imaging results/findings within the past 24 hours.

## 2023-02-15 NOTE — DISCHARGE SUMMARY
[facility] - Cardiac Medical ICU  Critical Care Medicine  Discharge Summary      Patient Name: Anthony Borges  MRN: [id number]  Admission Date: 2/11/2023  Hospital Length of Stay: 4 days  Discharge Date and Time:  02/15/2023 12:44 PM  Attending Physician: Mando Elliott MD   Discharging Provider: Marixa Azar MD  Primary Care Provider: Bean Pearce MD  Reason for Admission: sepsis, jaundice    HPI:   Mr. Anthony Borges is a 62 y.o. male with a PMHx of HTN, COPD, and CVA (residual RUE contracture and RLE weakness) who presented from a nursing home after the staff noticed he was jaundiced a day ago. Nursing staff also reports confusion and fatigue. HPI limited as patient is confused but he reports abdominal pain for the last 5 days and dysuria for the last 4 days. Denies any fevers, chills, chest pain, SOB, cough, nausea, diarrhea, or vomiting. He is prescribed an albuterol inhaler for COPD, denies sleeping with a bipap at night.     In the ED patient was afebrile, tachycardic to 110, BP 90/64, and was on RA. Labs significant for a WBC 16.1, Na 124, K 2.8, Alk phos 1600, Tbili 29, , , Ammonia 67. BNP, trop, and lacate x2 were normal. Initial ABG with a pH of 7.15 pCO2 of 76, repeat with a pH 7.54 and pCO2 of 33 without the use of bipap. Patient was given a dose of vanz/zosyn and was given 2.4L NS. CT head with no acute findings. CT abdomen with a large mass in the pancreatic head resulting in severe pancreatic ductal dilation and severe intra and extrahepatic biliary ductal dilation. Gen surg consulted and stated patient not a surgical candidate at this time. MICU consulted for sepsis and hyponatremia.      Procedure(s) (LRB):  ERCP (ENDOSCOPIC RETROGRADE CHOLANGIOPANCREATOGRAPHY) (N/A)  ULTRASOUND, UPPER GI TRACT, ENDOSCOPIC (N/A)    Indwelling Lines/Drains at Time of Discharge:   Lines/Drains/Airways     Drain  Duration                Urethral Catheter 02/11/23 1624 Straight-tip 3 days               Hospital Course:   2/14 Family at bedside. Extensive discussion about patient's current diagnosis and goals of care. Patient's family would like to pursue hospice. SW appreciated. [facility] Will prescribed zofran prn for nausea, continue midodrine for soft Bps, and finish abx course with 6 more days of augmentin.       Consults (From admission, onward)        Status Ordering Provider     Inpatient consult to SNF Nursing Home  Once        Provider:  (Not yet assigned)    Acknowledged CAROLINE CARLOS     Inpatient consult to Hospice  Once        Provider:  (Not yet assigned)    Acknowledged CAROLINE CARLOS     Inpatient consult to Palliative Care  Once        Provider:  (Not yet assigned)    Completed BRUNO MARTINEZ     Inpatient consult to PICC team ([facility]AS)  Once        Provider:  (Not yet assigned)    Completed RADHA VELEZ     Inpatient consult to Hematology/Oncology  Once        Provider:  (Not yet assigned)    Completed ZENA NGO     Inpatient consult to [facility] ([facility]  Once        Provider:  (Not yet assigned)    Completed RADHA MENDES     Inpatient consult to General Surgery  Once        Provider:  (Not yet assigned)    Completed MARIANNE GALDAMEZ JR.     Inpatient consult to Critical Care Medicine  Once        Provider:  (Not yet assigned)    Completed MARIANNE GALDAMEZ JR.        Significant Labs:  BMP:   Recent Labs   Lab 02/15/23  0229      *   K 3.4*   CL 92*   CO2 29   BUN 9   CREATININE 0.7   CALCIUM 8.5*   MG 2.1     CMP:   Recent Labs   Lab 02/14/23  0213 02/14/23  0800 02/15/23  0229   * 129* 129*   K 3.1* 3.7 3.4*   CL 91* 89* 92*   CO2 26 27 29   * 111* 102   BUN 7* 9 9   CREATININE 0.6 0.6 0.7   CALCIUM 8.5* 8.6* 8.5*   PROT 5.8*  --  5.8*   ALBUMIN 1.7*  --  1.7*   BILITOT 25.3*  --  25.8*   ALKPHOS 1,135*  --  1,051*   *  --  241*   *  --  245*   ANIONGAP 14 13 8     Significant  Imaging:  I have reviewed all pertinent imaging results/findings within the past 24 hours.    Pending Diagnostic Studies:     Procedure Component Value Units Date/Time    Cytology- FNA Radiology Guided, Bronch/EBUS, EUS/GI [[id number]] Collected: 02/13/23 1602    Order Status: Sent Lab Status: In process Updated: 02/13/23 1633        Final Active Diagnoses:    Diagnosis Date Noted POA    PRINCIPAL PROBLEM:  Pancreatic mass [K86.89] 02/11/2023 Yes    Hypokalemia [E87.6] 02/14/2023 No    Palliative care encounter [Z51.5] 02/13/2023 Not Applicable    Nausea [R11.0] 02/13/2023 Unknown    Goals of care, counseling/discussion [Z71.89] 02/13/2023 Not Applicable    Advance care planning [Z71.89] 02/13/2023 Not Applicable    Transaminitis [R74.01] 02/12/2023 Unknown    Elevated CA 19-9 level [R97.8] 02/12/2023 Unknown    Hyperammonemia [E72.20] 02/12/2023 Unknown    Acute encephalopathy [G93.40] 02/12/2023 Unknown    Hyponatremia [E87.1] 02/11/2023 Yes    Sepsis [A41.9] 05/12/2022 Unknown    History of CVA (cerebrovascular accident) [Z86.73] 05/12/2022 Not Applicable    Chronic obstructive lung disease [J44.9] 06/17/2020 Yes    Gastroesophageal reflux disease [K21.9] 06/17/2020 Yes    Hyperlipidemia [E78.5] 01/03/2018 Yes      Problems Resolved During this Admission:    Diagnosis Date Noted Date Resolved POA    Cholelithiasis [K80.20] 02/12/2023 02/12/2023 Unknown    Alkaline phosphatase elevation [R74.8] 02/12/2023 02/12/2023 Unknown    Altered mental state [R41.82] 02/12/2023 02/12/2023 Unknown     No new Assessment & Plan notes have been filed under this hospital service since the last note was generated.  Service: Critical Care Medicine    Discharged Condition: stable    Disposition: Hospice/Home      Patient Instructions:   No discharge procedures on file.  Medications:  Reconciled Home Medications:      Medication List      START taking these medications    amoxicillin-clavulanate 875-125mg 875-125 mg  per tablet  Commonly known as: AUGMENTIN  Take 1 tablet by mouth every 12 (twelve) hours. for 13 doses     midodrine 5 MG Tab  Commonly known as: PROAMATINE  Take 1 tablet (5 mg total) by mouth 3 (three) times daily.     ondansetron 8 MG Tbdl  Commonly known as: ZOFRAN-ODT  Take 1 tablet (8 mg total) by mouth every 8 (eight) hours as needed (nausea).        CONTINUE taking these medications    acetaminophen 325 MG tablet  Commonly known as: TYLENOL  Take 325 mg by mouth every 4 (four) hours as needed (headache/ fever).     ADVAIR DISKUS 250-50 mcg/dose diskus inhaler  Generic drug: fluticasone-salmeterol 250-50 mcg/dose  Inhale 1 puff into the lungs 2 (two) times daily.     aluminum-magnesium hydroxide-simethicone 200-200-20 mg/5 mL Susp  Commonly known as: MAALOX  Take 30 mLs by mouth every 4 (four) hours as needed (dyspepsia).     atorvastatin 20 MG tablet  Commonly known as: LIPITOR  Take 20 mg by mouth every evening.     bisacodyL 10 mg Supp  Commonly known as: DULCOLAX  Place 10 mg rectally daily as needed (constipation).     brimonidine 0.2% 0.2 % Drop  Commonly known as: ALPHAGAN  Place 1 drop into both eyes 2 (two) times a day.     bumetanide 2 MG tablet  Commonly known as: BUMEX  Take 2 mg by mouth once daily.     DIPHENHYDRAMINE-PHENYLEPHRINE ORAL  Take 10 mLs by mouth 3 (three) times daily as needed (cough).     ergocalciferol 50,000 unit Cap  Commonly known as: ERGOCALCIFEROL  Take 50,000 Units by mouth every Thursday.     famotidine 20 MG tablet  Commonly known as: PEPCID  Take 20 mg by mouth once daily.     guaiFENesin 600 mg 12 hr tablet  Commonly known as: MUCINEX  Take 600 mg by mouth 2 (two) times daily.     levETIRAcetam 500 MG Tab  Commonly known as: KEPPRA  Take 500 mg by mouth 2 (two) times daily.     metoclopramide HCl 5 mg/5 mL Soln  Commonly known as: REGLAN  Take 5 mg by mouth every 8 (eight) hours as needed (ileus issues).     metoprolol tartrate 25 MG tablet  Commonly known as:  LOPRESSOR  Take 25 mg by mouth 2 (two) times daily.     ONE DAILY MULTIVITAMIN per tablet  Generic drug: multivitamin  Take 1 tablet by mouth once daily.     potassium chloride SA 20 MEQ tablet  Commonly known as: K-DUR,KLOR-CON  Take 20 mEq by mouth 2 (two) times daily.     PROAIR HFA 90 mcg/actuation inhaler  Generic drug: albuterol  Inhale 2 puffs into the lungs every 4 (four) hours as needed for Wheezing. Rescue     senna 8.6 mg tablet  Commonly known as: SENOKOT  Take 2 tablets by mouth daily as needed for Constipation.     TRAVATAN Z 0.004 % ophthalmic solution  Generic drug: travoprost  Place 1 drop into both eyes every evening.     VITAMIN C 500 MG tablet  Generic drug: ascorbic acid (vitamin C)  Take 500 mg by mouth once daily.             Marixa Azar MD  Critical Care Medicine  [facility] - Cardiac Medical ICU

## 2023-02-15 NOTE — PLAN OF CARE
CMICU DAILY GOALS       A: Awake    RASS: Goal -    Actual -     Restraint necessity:    B: Breathe   SBT: Not intubated   C: Coordinate A & B, analgesics/sedatives   Pain: managed    SAT: Not intubated  D: Delirium   CAM-ICU: Overall CAM-ICU: Negative  E: Early(intubated/ Progressive (non-intubated) Mobility   MOVE Screen: Fail   Activity: Activity Management: Rolling - L1  FAS: Feeding/Nutrition   Diet order: Diet/Nutrition Received: NPO,    T: Thrombus   DVT prophylaxis: VTE Required Core Measure: Pharmacological prophylaxis initiated/maintained  H: HOB Elevation   Head of Bed (HOB) Positioning: HOB at 30-45 degrees  U: Ulcer Prophylaxis   GI: yes  G: Glucose control   managed Glycemic Management: blood glucose monitored  S: Skin   Bathing/Skin Care: bath, complete, dressed/undressed  Device Skin Pressure Protection: absorbent pad utilized/changed, adhesive use limited, positioning supports utilized, pressure points protected, skin-to-device areas padded  Pressure Reduction Devices: specialty bed utilized  Pressure Reduction Techniques: weight shift assistance provided  Skin Protection: adhesive use limited, incontinence pads utilized, protective footwear used, silicone foam dressing in place, skin-to-device areas padded, transparent dressing maintained, tubing/devices free from skin contact  B: Bowel Function   no issues   I: Indwelling Catheters   Roca necessity:      Urethral Catheter 02/11/23 1624 Straight-tip-Reason for Continuing Urinary Catheterization: Critically ill in ICU and requiring hourly monitoring of intake/output   CVC necessity: No  D: De-escalation Antibiotics   No    Family/Goals of care/Code Status   Code Status: DNR    24H Vital Sign Range  Temp:  [97.7 °F (36.5 °C)-97.9 °F (36.6 °C)]   Pulse:  []   Resp:  [10-15]   BP: ()/(50-69)   SpO2:  [94 %-99 %]      Shift Events   No acute events throughout shift. Family met with palliative and critical care team about GOC. VS and  assessment per flow sheet, patient progressing towards goals as tolerated, plan of care reviewed with  Pt , all concerns addressed, will continue to monitor.    Sam Ceron

## 2023-02-15 NOTE — PLAN OF CARE
CM received call from Christo with [facility], they service [facility]. [facility] is meeting with family this morning. CM reached out to medical team if patient was medically ready to discharge back to [facility] with hospice. CM to follow for response.      Elise Arndt RN     [phone]

## 2023-02-15 NOTE — CHAPLAIN
"Palliative Care     Patient: Anthony Borges       MRN: [id number]  : 1960  Age: 62 y.o.  Hospital Length of Stay: 4 days  Code Status: DNR   Attending Provider: Mando Elliott MD  Principal Problem: Pancreatic mass    Present during Interview:  Visited palliative pt alone, no family present.    Non-clinical observations/Facts:  Per notes and [facility] IDT meeting, discussions for hospice happening.  Pt was awake, but sleepy, lying in bed with his blanket from home on him; said he's not in pain; is comfortable.    Feelings/Family/Lynn  (Emotions/Fears/Experiences/Coping):      Pt very sweet, asking about me-- my name (again), if I have kids, how I'm doing. Answered graciously, but shifted back to patient. He has 3 kids and also grand kids, they call him "paw paw."      Pt is Rastafarian and had rosary crucifix around his neck. Pt welcomed me to pray over/with/for him and did so, followed by the Our Father prayer that we said together while holding hands. Pt thanked me for the visit and the prayer.     Future (Spiritual Care Plan of Action):  Palliative  will continue to be available while pt and family are here. Lord, in your mercy.    In Peace,  Rev. Loulou Varghese MBA, MDiv   "

## 2023-02-15 NOTE — PLAN OF CARE
CM sent Nursing Orders - Admit to Hospice and required documents to [facility] via [employer]. Noemy with [facility] advised to set up tranportation for after 3 pm. CM to follow for discharge.      Elise Arndt RN     [phone]

## 2023-02-16 LAB
BACTERIA BLD CULT: NORMAL
BACTERIA BLD CULT: NORMAL

## 2023-02-16 NOTE — PLAN OF CARE
[facility] - Cardiac Medical ICU  Discharge Final Note    Primary Care Provider: Bean Pearce MD    Expected Discharge Date: 2/15/2023    Patient discharged 2/15/2023 back to [facility] with hospice. [facility] provided transportation. Family present.    Final Discharge Note (most recent)       Final Note - 02/16/23 0931          Final Note    Assessment Type Final Discharge Note     Anticipated Discharge Disposition [facility] Nursing Home   hospice       Post-Acute Status    Post-Acute Authorization Placement     Post-Acute Placement Status Set-up Complete/Auth obtained     Discharge Delays None known at this time                     Important Message from Medicare             Elise Arndt RN     [phone]

## 2023-02-16 NOTE — PROGRESS NOTES
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    I see he has been discharged to hospice, which is understandable.    I called his sister Dalia to discuss his results over the phone, since they were just formalized. She was appreciative of the call.

## 2023-05-15 PROBLEM — A41.9 SEPSIS: Status: RESOLVED | Noted: 2022-05-12 | Resolved: 2023-05-15

## 2023-05-15 PROBLEM — J15.212 PNEUMONIA DUE TO METHICILLIN RESISTANT STAPHYLOCOCCUS AUREUS: Status: RESOLVED | Noted: 2020-06-17 | Resolved: 2023-05-15
